# Patient Record
Sex: MALE | NOT HISPANIC OR LATINO | Employment: OTHER | URBAN - METROPOLITAN AREA
[De-identification: names, ages, dates, MRNs, and addresses within clinical notes are randomized per-mention and may not be internally consistent; named-entity substitution may affect disease eponyms.]

---

## 2017-02-01 ENCOUNTER — HOSPITAL ENCOUNTER (EMERGENCY)
Facility: MEDICAL CENTER | Age: 67
End: 2017-02-02
Attending: EMERGENCY MEDICINE
Payer: MEDICAID

## 2017-02-01 ENCOUNTER — APPOINTMENT (OUTPATIENT)
Dept: RADIOLOGY | Facility: MEDICAL CENTER | Age: 67
End: 2017-02-01
Attending: SURGERY
Payer: MEDICAID

## 2017-02-01 ENCOUNTER — APPOINTMENT (OUTPATIENT)
Dept: RADIOLOGY | Facility: MEDICAL CENTER | Age: 67
End: 2017-02-01
Attending: EMERGENCY MEDICINE
Payer: MEDICAID

## 2017-02-01 DIAGNOSIS — F10.920 ALCOHOL INTOXICATION, UNCOMPLICATED (HCC): ICD-10-CM

## 2017-02-01 LAB
ABO GROUP BLD: NORMAL
ABO GROUP BLD: NORMAL
ALBUMIN SERPL BCP-MCNC: 4.6 G/DL (ref 3.2–4.9)
ALBUMIN/GLOB SERPL: 1.3 G/DL
ALP SERPL-CCNC: 86 U/L (ref 30–99)
ALT SERPL-CCNC: 19 U/L (ref 2–50)
ANION GAP SERPL CALC-SCNC: 16 MMOL/L (ref 0–11.9)
APTT PPP: 29.5 SEC (ref 24.7–36)
AST SERPL-CCNC: 24 U/L (ref 12–45)
BILIRUB SERPL-MCNC: 0.3 MG/DL (ref 0.1–1.5)
BLD GP AB INVEST PLASRBC-IMP: NORMAL
BLD GP AB SCN SERPL QL: NORMAL
BUN SERPL-MCNC: 12 MG/DL (ref 8–22)
CALCIUM SERPL-MCNC: 9.7 MG/DL (ref 8.5–10.5)
CFT BLD TEG: 5.2 MIN (ref 5–10)
CHLORIDE SERPL-SCNC: 106 MMOL/L (ref 96–112)
CLOT ANGLE BLD TEG: 67 DEGREES (ref 53–72)
CLOT LYSIS 30M P MA LENFR BLD TEG: 0 % (ref 0–8)
CO2 SERPL-SCNC: 12 MMOL/L (ref 20–33)
CREAT SERPL-MCNC: 0.98 MG/DL (ref 0.5–1.4)
CT.EXTRINSIC BLD ROTEM: 1.7 MIN (ref 1–3)
ERYTHROCYTE [DISTWIDTH] IN BLOOD BY AUTOMATED COUNT: 45 FL (ref 35.9–50)
ETHANOL BLD-MCNC: 0.2 G/DL
GFR SERPL CREATININE-BSD FRML MDRD: >60 ML/MIN/1.73 M 2
GLOBULIN SER CALC-MCNC: 3.6 G/DL (ref 1.9–3.5)
GLUCOSE SERPL-MCNC: 112 MG/DL (ref 65–99)
HCT VFR BLD AUTO: 49.9 % (ref 42–52)
HGB BLD-MCNC: 16.8 G/DL (ref 14–18)
INR PPP: 0.89 (ref 0.87–1.13)
KELL GROUP AG RBC: NORMAL
LACTATE BLD-SCNC: 3.1 MMOL/L (ref 0.5–2)
MCF BLD TEG: 71.4 MM (ref 50–70)
MCH RBC QN AUTO: 30.7 PG (ref 27–33)
MCHC RBC AUTO-ENTMCNC: 33.7 G/DL (ref 33.7–35.3)
MCV RBC AUTO: 91.1 FL (ref 81.4–97.8)
PA AA BLD-ACNC: 85.2 %
PA ADP BLD-ACNC: 30.5 %
PLATELET # BLD AUTO: 250 K/UL (ref 164–446)
PMV BLD AUTO: 9.7 FL (ref 9–12.9)
POC BREATHALIZER: 0.11 PERCENT (ref 0–0.01)
POTASSIUM SERPL-SCNC: 4.7 MMOL/L (ref 3.6–5.5)
PROT SERPL-MCNC: 8.2 G/DL (ref 6–8.2)
PROTHROMBIN TIME: 12.3 SEC (ref 12–14.6)
RBC # BLD AUTO: 5.48 M/UL (ref 4.7–6.1)
RH BLD: NORMAL
SODIUM SERPL-SCNC: 134 MMOL/L (ref 135–145)
TEG ALGORITHM TGALG: ABNORMAL
WBC # BLD AUTO: 10 K/UL (ref 4.8–10.8)

## 2017-02-01 PROCEDURE — 85610 PROTHROMBIN TIME: CPT

## 2017-02-01 PROCEDURE — 302970 POC BREATHALIZER: Performed by: EMERGENCY MEDICINE

## 2017-02-01 PROCEDURE — 305949 HCHG RED TRAUMA ACT PRE-NOTIFY NO CC

## 2017-02-01 PROCEDURE — 99285 EMERGENCY DEPT VISIT HI MDM: CPT

## 2017-02-01 PROCEDURE — 85027 COMPLETE CBC AUTOMATED: CPT

## 2017-02-01 PROCEDURE — 72131 CT LUMBAR SPINE W/O DYE: CPT

## 2017-02-01 PROCEDURE — 700111 HCHG RX REV CODE 636 W/ 250 OVERRIDE (IP): Performed by: EMERGENCY MEDICINE

## 2017-02-01 PROCEDURE — G0390 TRAUMA RESPONS W/HOSP CRITI: HCPCS

## 2017-02-01 PROCEDURE — 85384 FIBRINOGEN ACTIVITY: CPT

## 2017-02-01 PROCEDURE — 36415 COLL VENOUS BLD VENIPUNCTURE: CPT

## 2017-02-01 PROCEDURE — 71260 CT THORAX DX C+: CPT

## 2017-02-01 PROCEDURE — 86870 RBC ANTIBODY IDENTIFICATION: CPT

## 2017-02-01 PROCEDURE — 85576 BLOOD PLATELET AGGREGATION: CPT

## 2017-02-01 PROCEDURE — 86905 BLOOD TYPING RBC ANTIGENS: CPT

## 2017-02-01 PROCEDURE — 93005 ELECTROCARDIOGRAM TRACING: CPT | Performed by: EMERGENCY MEDICINE

## 2017-02-01 PROCEDURE — 85730 THROMBOPLASTIN TIME PARTIAL: CPT

## 2017-02-01 PROCEDURE — 85347 COAGULATION TIME ACTIVATED: CPT

## 2017-02-01 PROCEDURE — 80307 DRUG TEST PRSMV CHEM ANLYZR: CPT

## 2017-02-01 PROCEDURE — 96361 HYDRATE IV INFUSION ADD-ON: CPT

## 2017-02-01 PROCEDURE — 86900 BLOOD TYPING SEROLOGIC ABO: CPT

## 2017-02-01 PROCEDURE — 71010 DX-CHEST-LIMITED (1 VIEW): CPT

## 2017-02-01 PROCEDURE — 86901 BLOOD TYPING SEROLOGIC RH(D): CPT

## 2017-02-01 PROCEDURE — 700105 HCHG RX REV CODE 258: Performed by: EMERGENCY MEDICINE

## 2017-02-01 PROCEDURE — 80053 COMPREHEN METABOLIC PANEL: CPT

## 2017-02-01 PROCEDURE — 96374 THER/PROPH/DIAG INJ IV PUSH: CPT | Mod: XU

## 2017-02-01 PROCEDURE — 70450 CT HEAD/BRAIN W/O DYE: CPT

## 2017-02-01 PROCEDURE — 86850 RBC ANTIBODY SCREEN: CPT

## 2017-02-01 PROCEDURE — 83605 ASSAY OF LACTIC ACID: CPT

## 2017-02-01 PROCEDURE — 72125 CT NECK SPINE W/O DYE: CPT

## 2017-02-01 PROCEDURE — 72128 CT CHEST SPINE W/O DYE: CPT

## 2017-02-01 RX ORDER — SODIUM CHLORIDE 9 MG/ML
2000 INJECTION, SOLUTION INTRAVENOUS ONCE
Status: COMPLETED | OUTPATIENT
Start: 2017-02-01 | End: 2017-02-02

## 2017-02-01 RX ADMIN — SODIUM CHLORIDE 2000 ML: 9 INJECTION, SOLUTION INTRAVENOUS at 22:26

## 2017-02-01 RX ADMIN — FENTANYL CITRATE 50 MCG: 50 INJECTION, SOLUTION INTRAMUSCULAR; INTRAVENOUS at 20:43

## 2017-02-01 ASSESSMENT — PAIN SCALES - GENERAL: PAINLEVEL_OUTOF10: 7

## 2017-02-01 NOTE — ED AVS SNAPSHOT
2/2/2017          Servando Durham  [address]  [place] [address]    Dear Servando:    [facility] wants to ensure your discharge home is safe and you or your loved ones have had all your questions answered regarding your care after you leave the hospital.    You may receive a telephone call within two days of your discharge.  This call is to make certain you understand your discharge instructions as well as ensure we provided you with the best care possible during your stay with us.     The call will only last approximately 3-5 minutes and will be done by a nurse.    Once again, we want to ensure your discharge home is safe and that you have a clear understanding of any next steps in your care.  If you have any questions or concerns, please do not hesitate to contact us, we are here for you.  Thank you for choosing [facility] for your healthcare needs.    Sincerely,    Tab March    [facility]

## 2017-02-01 NOTE — ED AVS SNAPSHOT
FilmMe Access Code: [email or web address]  Expires: 3/4/2017  2:04 AM    Your email address is not on file at [facility].  Email Addresses are required for you to sign up for FilmMe, please contact [phone] to verify your personal information and to provide your email address prior to attempting to register for FilmMe.    Servando Durham  [address]  [place] [address]    FilmMe  A secure, online tool to manage your health information     [facility]’s FilmMe® is a secure, online tool that connects you to your personalized health information from the privacy of your home -- day or night - making it very easy for you to manage your healthcare. Once the activation process is completed, you can even access your medical information using the FilmMe jaya, which is available for free in the Apple Jaya store or Google Play store.     To learn more about FilmMe, visit [email or web address]    There are two levels of access available (as shown below):   My Chart Features  [facility] Primary Care Doctor [facility]  Specialists [facility]  Urgent  Care Non-[facility] Primary Care Doctor   Email your healthcare team securely and privately 24/7 X X X    Manage appointments: schedule your next appointment; view details of past/upcoming appointments X      Request prescription refills. X      View recent personal medical records, including lab and immunizations X X X X   View health record, including health history, allergies, medications X X X X   Read reports about your outpatient visits, procedures, consult and ER notes X X X X   See your discharge summary, which is a recap of your hospital and/or ER visit that includes your diagnosis, lab results, and care plan X X  X     How to register for [employer]:  Once your e-mail address has been verified, follow the following steps to sign up for FilmMe.     1. Go to  [email or web address]  2. Click on the Sign Up Now box, which takes you to the New Member Sign Up page. You will need to  provide the following information:  a. Enter your Virtway Access Code exactly as it appears at the top of this page. (You will not need to use this code after you’ve completed the sign-up process. If you do not sign up before the expiration date, you must request a new code.)   b. Enter your date of birth.   c. Enter your home email address.   d. Click Submit, and follow the next screen’s instructions.  3. Create a Virtway ID. This will be your Virtway login ID and cannot be changed, so think of one that is secure and easy to remember.  4. Create a Virtway password. You can change your password at any time.  5. Enter your Password Reset Question and Answer. This can be used at a later time if you forget your password.   6. Enter your e-mail address. This allows you to receive e-mail notifications when new information is available in Virtway.  7. Click Sign Up. You can now view your health information.    For assistance activating your Virtway account, call [phone]

## 2017-02-01 NOTE — ED AVS SNAPSHOT
After Visit Summary                                                                                                                Servando Durham   MRN: [id number]    Department:  [facility], Emergency Dept   Date of Visit:  2/1/2017            [facility], Emergency Dept    [address] [address]    Phone:  [phone]      You were seen by     Jabari Valenzuela M.D.      Your Diagnosis Was     Alcohol intoxication, uncomplicated (CMS-HCC)     F10.120       These are the medications you received during your hospitalization from 02/01/2017 2025 to 02/02/2017 0204     Date/Time Order Dose Route Action    02/01/2017 2043 fentaNYL (SUBLIMAZE) injection 50 mcg Intravenous Given    02/01/2017 2226 NS infusion 2,000 mL 2,000 mL Intravenous New Bag      Follow-up Information     1. Follow up with KAY Lopez.    Specialty:  Family Medicine    Contact information    [address] [place] [address]  [phone]          2. Follow up with [facility], Emergency Dept.    Specialty:  Emergency Medicine    Why:  If symptoms worsen    Contact information    [address] [place] [id number] [phone]      Medication Information     Review all of your home medications and newly ordered medications with your primary doctor and/or pharmacist as soon as possible. Follow medication instructions as directed by your doctor and/or pharmacist.     Please keep your complete medication list with you and share with your physician. Update the information when medications are discontinued, doses are changed, or new medications (including over-the-counter products) are added; and carry medication information at all times in the event of emergency situations.               Medication List      Notice     You have not been prescribed any medications.            Procedures and tests performed during your visit     Procedure/Test Number of Times  "Performed    ABO CONFIRMATION 1    ANTIBODY IDENTIFICATION 1    APTT 1    CBC WITHOUT DIFFERENTIAL 1    COD (ADULT) 1    COMP METABOLIC PANEL 1    COMPONENT CELLULAR 1    CT-CHEST,ABDOMEN,PELVIS WITH 1    CT-CSPINE WITHOUT PLUS RECONS 1    CT-HEAD W/O 1    CT-LSPINE W/O PLUS RECONS 1    CT-TSPINE W/O PLUS RECONS 1    DIAGNOSTIC ALCOHOL 1    DX-CHEST-LIMITED (1 VIEW) 1    EKG (ER) 1    ESTIMATED GFR 1    LACTIC ACID 1    PLATELET MAPPING WITH BASIC TEG 1    POC BREATHALIZER 2    PROTHROMBIN TIME 1    RBC ANTIGEN TYPING, K1 (JUAN PABLO) 1        Discharge Instructions       Alcohol Intoxication  Alcohol intoxication occurs when the amount of alcohol that a person has consumed impairs his or her ability to mentally and physically function. Alcohol directly impairs the normal chemical activity of the brain. Drinking large amounts of alcohol can lead to changes in mental function and behavior, and it can cause many physical effects that can be harmful.   Alcohol intoxication can range in severity from mild to very severe. Various factors can affect the level of intoxication that occurs, such as the person's age, gender, weight, frequency of alcohol consumption, and the presence of other medical conditions (such as diabetes, seizures, or heart conditions). Dangerous levels of alcohol intoxication may occur when people drink large amounts of alcohol in a short period (binge drinking). Alcohol can also be especially dangerous when combined with certain prescription medicines or \"recreational\" drugs.  SIGNS AND SYMPTOMS  Some common signs and symptoms of mild alcohol intoxication include:  · Loss of coordination.  · Changes in mood and behavior.  · Impaired judgment.  · Slurred speech.  As alcohol intoxication progresses to more severe levels, other signs and symptoms will appear. These may include:  · Vomiting.  · Confusion and impaired memory.  · Slowed breathing.  · Seizures.  · Loss of consciousness.  DIAGNOSIS   Your health " care provider will take a medical history and perform a physical exam. You will be asked about the amount and type of alcohol you have consumed. Blood tests will be done to measure the concentration of alcohol in your blood. In many places, your blood alcohol level must be lower than 80 mg/dL (0.08%) to legally drive. However, many dangerous effects of alcohol can occur at much lower levels.   TREATMENT   People with alcohol intoxication often do not require treatment. Most of the effects of alcohol intoxication are temporary, and they go away as the alcohol naturally leaves the body. Your health care provider will monitor your condition until you are stable enough to go home. Fluids are sometimes given through an IV access tube to help prevent dehydration.   HOME CARE INSTRUCTIONS  · Do not drive after drinking alcohol.  · Stay hydrated. Drink enough water and fluids to keep your urine clear or pale yellow. Avoid caffeine.    · Only take over-the-counter or prescription medicines as directed by your health care provider.    SEEK MEDICAL CARE IF:   · You have persistent vomiting.    · You do not feel better after a few days.  · You have frequent alcohol intoxication. Your health care provider can help determine if you should see a substance use treatment counselor.  SEEK IMMEDIATE MEDICAL CARE IF:   · You become shaky or tremble when you try to stop drinking.    · You shake uncontrollably (seizure).    · You throw up (vomit) blood. This may be bright red or may look like black coffee grounds.    · You have blood in your stool. This may be bright red or may appear as a black, tarry, bad smelling stool.    · You become lightheaded or faint.    MAKE SURE YOU:   · Understand these instructions.  · Will watch your condition.  · Will get help right away if you are not doing well or get worse.     This information is not intended to replace advice given to you by your health care provider. Make sure you discuss any  questions you have with your health care provider.     Document Released: 09/27/2006 Document Revised: 08/20/2014 Document Reviewed: 05/23/2014  Elsevier Interactive Patient Education ©2016 [employer]            Patient Information     Patient Information    Following emergency treatment: all patient requiring follow-up care must return either to a private physician or a clinic if your condition worsens before you are able to obtain further medical attention, please return to the emergency room.     Billing Information    At [facility], we work to make the billing process streamlined for our patients.  Our Representatives are here to answer any questions you may have regarding your hospital bill.  If you have insurance coverage and have supplied your insurance information to us, we will submit a claim to your insurer on your behalf.  Should you have any questions regarding your bill, we can be reached online or by phone as follows:  Online: You are able pay your bills online or live chat with our representatives about any billing questions you may have. We are here to help Monday - Friday from 8:00am to 7:30pm and 9:00am - 12:00pm on Saturdays.  Please visit [email or web address]  for more information.   Phone:  [phone] or [phone]    Please note that your emergency physician, surgeon, pathologist, radiologist, anesthesiologist, and other specialists are not employed by [facility] and will therefore bill separately for their services.  Please contact them directly for any questions concerning their bills at the numbers below:     Emergency Physician Services:  [phone]  [facility]:  [phone]  [facility]:  [phone]  [facility]:  [phone]    1. Your final bill may vary from the amount quoted upon discharge if all procedures are not complete at that time, or if your doctor has additional procedures of which  we are not aware. You will receive an additional bill if you return to the Emergency Department at [facility] for suture removal regardless of the facility of which the sutures were placed.     2. Please arrange for settlement of this account at the emergency registration.    3. All self-pay accounts are due in full at the time of treatment.  If you are unable to meet this obligation then payment is expected within 4-5 days.     4. If you have had radiology studies (CT, X-ray, Ultrasound, MRI), you have received a preliminary result during your emergency department visit. Please contact the radiology department [phone] to receive a copy of your final result. Please discuss the Final result with your primary physician or with the follow up physician provided.     Crisis Hotline:  [place] Crisis Hotline:  0-827-EAHLUFL or 1-964.824.4199  [place] Crisis Hotline:    1-152.181.4048 or 375-578-8357         ED Discharge Follow Up Questions    1. In order to provide you with very good care, we would like to follow up with a phone call in the next few days.  May we have your permission to contact you?     YES /  NO    2. What is the best phone number to call you? (       )_____-__________    3. What is the best time to call you?      Morning  /  Afternoon  /  Evening                   Patient Signature:  ____________________________________________________________    Date:  ____________________________________________________________

## 2017-02-02 VITALS
SYSTOLIC BLOOD PRESSURE: 120 MMHG | DIASTOLIC BLOOD PRESSURE: 75 MMHG | HEART RATE: 78 BPM | BODY MASS INDEX: 48.21 KG/M2 | WEIGHT: 300 LBS | TEMPERATURE: 97.9 F | OXYGEN SATURATION: 100 % | HEIGHT: 66 IN | RESPIRATION RATE: 12 BRPM

## 2017-02-02 LAB — POC BREATHALIZER: 0.03 PERCENT (ref 0–0.01)

## 2017-02-02 PROCEDURE — 302970 POC BREATHALIZER

## 2017-02-02 NOTE — ED NOTES
Patient sleeping on gurney. Active chest rise and fall noted. No restlessness/agitation noted. Call bell remains within reach.

## 2017-02-02 NOTE — ED NOTES
Patient Patient sleeping on gurney. Active chest rise and fall noted. No restlessness/agitation noted. Call bell remains within reach.

## 2017-02-02 NOTE — ED NOTES
Patient from CT scan to [facility] via [employer]. Report received at bedside from SANTIAGO Carvalho.
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
## 2017-02-02 NOTE — ED NOTES
L chest wall central line removed. Patient tolerated well. Pressure held to site for 5 full minutes. No S/S bleeding noted.

## 2017-02-02 NOTE — ED NOTES
Patient given shirt from care closet.     Patient discharged in stable condition. Verbalized understanding of all discharge instructions. All belongings accounted for.    Patient placed in wheelchair and brought to front lobby. Assisted with calling MTM. Patient to wait in lobby until ride home arrives. OK as per FAUSTINO and MARKELL.

## 2017-02-02 NOTE — ED NOTES
Patient sleeping. Noted to be hypotensive. Discussed with ERP. IV fluids hung per orders.    Patient woken up. When fully awake BP WNL. ERP aware.
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
## 2017-02-05 LAB — EKG IMPRESSION: NORMAL

## 2017-03-19 ENCOUNTER — HOSPITAL ENCOUNTER (OUTPATIENT)
Facility: MEDICAL CENTER | Age: 67
End: 2017-03-20
Attending: EMERGENCY MEDICINE | Admitting: HOSPITALIST
Payer: MEDICAID

## 2017-03-19 ENCOUNTER — APPOINTMENT (OUTPATIENT)
Dept: RADIOLOGY | Facility: MEDICAL CENTER | Age: 67
End: 2017-03-19
Attending: EMERGENCY MEDICINE
Payer: MEDICAID

## 2017-03-19 ENCOUNTER — RESOLUTE PROFESSIONAL BILLING HOSPITAL PROF FEE (OUTPATIENT)
Dept: HOSPITALIST | Facility: MEDICAL CENTER | Age: 67
End: 2017-03-19
Payer: MEDICAID

## 2017-03-19 DIAGNOSIS — R07.9 CHEST PAIN, UNSPECIFIED TYPE: ICD-10-CM

## 2017-03-19 LAB
ALBUMIN SERPL BCP-MCNC: 4 G/DL (ref 3.2–4.9)
ALBUMIN/GLOB SERPL: 1.3 G/DL
ALP SERPL-CCNC: 85 U/L (ref 30–99)
ALT SERPL-CCNC: 18 U/L (ref 2–50)
ANION GAP SERPL CALC-SCNC: 7 MMOL/L (ref 0–11.9)
APTT PPP: 30 SEC (ref 24.7–36)
AST SERPL-CCNC: 15 U/L (ref 12–45)
BASOPHILS # BLD AUTO: 1.4 % (ref 0–1.8)
BASOPHILS # BLD: 0.11 K/UL (ref 0–0.12)
BILIRUB SERPL-MCNC: 0.4 MG/DL (ref 0.1–1.5)
BNP SERPL-MCNC: 41 PG/ML (ref 0–100)
BUN SERPL-MCNC: 11 MG/DL (ref 8–22)
CALCIUM SERPL-MCNC: 9.7 MG/DL (ref 8.5–10.5)
CHLORIDE SERPL-SCNC: 103 MMOL/L (ref 96–112)
CO2 SERPL-SCNC: 25 MMOL/L (ref 20–33)
CREAT SERPL-MCNC: 1.05 MG/DL (ref 0.5–1.4)
EKG IMPRESSION: NORMAL
EOSINOPHIL # BLD AUTO: 0.66 K/UL (ref 0–0.51)
EOSINOPHIL NFR BLD: 8.2 % (ref 0–6.9)
ERYTHROCYTE [DISTWIDTH] IN BLOOD BY AUTOMATED COUNT: 44.5 FL (ref 35.9–50)
GFR SERPL CREATININE-BSD FRML MDRD: >60 ML/MIN/1.73 M 2
GLOBULIN SER CALC-MCNC: 3.2 G/DL (ref 1.9–3.5)
GLUCOSE SERPL-MCNC: 87 MG/DL (ref 65–99)
HCT VFR BLD AUTO: 46.6 % (ref 42–52)
HGB BLD-MCNC: 15.6 G/DL (ref 14–18)
IMM GRANULOCYTES # BLD AUTO: 0.03 K/UL (ref 0–0.11)
IMM GRANULOCYTES NFR BLD AUTO: 0.4 % (ref 0–0.9)
INR PPP: 0.92 (ref 0.87–1.13)
LIPASE SERPL-CCNC: 14 U/L (ref 11–82)
LYMPHOCYTES # BLD AUTO: 2.61 K/UL (ref 1–4.8)
LYMPHOCYTES NFR BLD: 32.5 % (ref 22–41)
MCH RBC QN AUTO: 30.3 PG (ref 27–33)
MCHC RBC AUTO-ENTMCNC: 33.5 G/DL (ref 33.7–35.3)
MCV RBC AUTO: 90.5 FL (ref 81.4–97.8)
MONOCYTES # BLD AUTO: 0.78 K/UL (ref 0–0.85)
MONOCYTES NFR BLD AUTO: 9.7 % (ref 0–13.4)
NEUTROPHILS # BLD AUTO: 3.83 K/UL (ref 1.82–7.42)
NEUTROPHILS NFR BLD: 47.8 % (ref 44–72)
NRBC # BLD AUTO: 0 K/UL
NRBC BLD AUTO-RTO: 0 /100 WBC
PLATELET # BLD AUTO: 265 K/UL (ref 164–446)
PMV BLD AUTO: 9.3 FL (ref 9–12.9)
POTASSIUM SERPL-SCNC: 4.1 MMOL/L (ref 3.6–5.5)
PROT SERPL-MCNC: 7.2 G/DL (ref 6–8.2)
PROTHROMBIN TIME: 12.6 SEC (ref 12–14.6)
RBC # BLD AUTO: 5.15 M/UL (ref 4.7–6.1)
SODIUM SERPL-SCNC: 135 MMOL/L (ref 135–145)
TROPONIN I SERPL-MCNC: <0.01 NG/ML (ref 0–0.04)
WBC # BLD AUTO: 8 K/UL (ref 4.8–10.8)

## 2017-03-19 PROCEDURE — 96372 THER/PROPH/DIAG INJ SC/IM: CPT

## 2017-03-19 PROCEDURE — 85610 PROTHROMBIN TIME: CPT

## 2017-03-19 PROCEDURE — 83690 ASSAY OF LIPASE: CPT

## 2017-03-19 PROCEDURE — 93306 TTE W/DOPPLER COMPLETE: CPT

## 2017-03-19 PROCEDURE — 80053 COMPREHEN METABOLIC PANEL: CPT

## 2017-03-19 PROCEDURE — 93010 ELECTROCARDIOGRAM REPORT: CPT | Performed by: INTERNAL MEDICINE

## 2017-03-19 PROCEDURE — 83880 ASSAY OF NATRIURETIC PEPTIDE: CPT

## 2017-03-19 PROCEDURE — 85025 COMPLETE CBC W/AUTO DIFF WBC: CPT

## 2017-03-19 PROCEDURE — 84484 ASSAY OF TROPONIN QUANT: CPT

## 2017-03-19 PROCEDURE — 93005 ELECTROCARDIOGRAM TRACING: CPT | Performed by: HOSPITALIST

## 2017-03-19 PROCEDURE — 99285 EMERGENCY DEPT VISIT HI MDM: CPT

## 2017-03-19 PROCEDURE — 85730 THROMBOPLASTIN TIME PARTIAL: CPT

## 2017-03-19 PROCEDURE — G0378 HOSPITAL OBSERVATION PER HR: HCPCS

## 2017-03-19 PROCEDURE — 93005 ELECTROCARDIOGRAM TRACING: CPT | Performed by: EMERGENCY MEDICINE

## 2017-03-19 PROCEDURE — 36415 COLL VENOUS BLD VENIPUNCTURE: CPT

## 2017-03-19 PROCEDURE — 700111 HCHG RX REV CODE 636 W/ 250 OVERRIDE (IP): Performed by: HOSPITALIST

## 2017-03-19 PROCEDURE — 71010 DX-CHEST-LIMITED (1 VIEW): CPT

## 2017-03-19 PROCEDURE — A9270 NON-COVERED ITEM OR SERVICE: HCPCS | Performed by: HOSPITALIST

## 2017-03-19 PROCEDURE — 700102 HCHG RX REV CODE 250 W/ 637 OVERRIDE(OP): Performed by: HOSPITALIST

## 2017-03-19 PROCEDURE — 93306 TTE W/DOPPLER COMPLETE: CPT | Mod: 26 | Performed by: INTERNAL MEDICINE

## 2017-03-19 PROCEDURE — 99220 PR INITIAL OBSERVATION CARE,LEVL III: CPT | Performed by: HOSPITALIST

## 2017-03-19 RX ORDER — ONDANSETRON 2 MG/ML
4 INJECTION INTRAMUSCULAR; INTRAVENOUS EVERY 4 HOURS PRN
Status: DISCONTINUED | OUTPATIENT
Start: 2017-03-19 | End: 2017-03-20 | Stop reason: HOSPADM

## 2017-03-19 RX ORDER — PROMETHAZINE HYDROCHLORIDE 25 MG/1
12.5-25 TABLET ORAL EVERY 4 HOURS PRN
Status: DISCONTINUED | OUTPATIENT
Start: 2017-03-19 | End: 2017-03-20 | Stop reason: HOSPADM

## 2017-03-19 RX ORDER — OXYCODONE HYDROCHLORIDE 5 MG/1
5 TABLET ORAL
Status: DISCONTINUED | OUTPATIENT
Start: 2017-03-19 | End: 2017-03-20 | Stop reason: HOSPADM

## 2017-03-19 RX ORDER — OMEPRAZOLE 20 MG/1
20 CAPSULE, DELAYED RELEASE ORAL DAILY
Status: DISCONTINUED | OUTPATIENT
Start: 2017-03-19 | End: 2017-03-20 | Stop reason: HOSPADM

## 2017-03-19 RX ORDER — CALCIUM CARBONATE 500 MG/1
1000 TABLET, CHEWABLE ORAL 3 TIMES DAILY PRN
COMMUNITY

## 2017-03-19 RX ORDER — BISACODYL 10 MG
10 SUPPOSITORY, RECTAL RECTAL
Status: DISCONTINUED | OUTPATIENT
Start: 2017-03-19 | End: 2017-03-20 | Stop reason: HOSPADM

## 2017-03-19 RX ORDER — IBUPROFEN 200 MG
200 TABLET ORAL EVERY 6 HOURS PRN
COMMUNITY

## 2017-03-19 RX ORDER — AMOXICILLIN 250 MG
2 CAPSULE ORAL 2 TIMES DAILY
Status: DISCONTINUED | OUTPATIENT
Start: 2017-03-19 | End: 2017-03-20 | Stop reason: HOSPADM

## 2017-03-19 RX ORDER — MORPHINE SULFATE 4 MG/ML
1-4 INJECTION, SOLUTION INTRAMUSCULAR; INTRAVENOUS
Status: DISCONTINUED | OUTPATIENT
Start: 2017-03-19 | End: 2017-03-19

## 2017-03-19 RX ORDER — LANOLIN ALCOHOL/MO/W.PET/CERES
CREAM (GRAM) TOPICAL PRN
Status: DISCONTINUED | OUTPATIENT
Start: 2017-03-19 | End: 2017-03-20 | Stop reason: HOSPADM

## 2017-03-19 RX ORDER — DIPHENHYDRAMINE HCL 25 MG
25 TABLET ORAL EVERY 6 HOURS PRN
Status: DISCONTINUED | OUTPATIENT
Start: 2017-03-19 | End: 2017-03-20 | Stop reason: HOSPADM

## 2017-03-19 RX ORDER — ONDANSETRON 4 MG/1
4 TABLET, ORALLY DISINTEGRATING ORAL EVERY 4 HOURS PRN
Status: DISCONTINUED | OUTPATIENT
Start: 2017-03-19 | End: 2017-03-20 | Stop reason: HOSPADM

## 2017-03-19 RX ORDER — LISINOPRIL 20 MG/1
40 TABLET ORAL DAILY
Status: DISCONTINUED | OUTPATIENT
Start: 2017-03-20 | End: 2017-03-20 | Stop reason: HOSPADM

## 2017-03-19 RX ORDER — LOVASTATIN 20 MG/1
20 TABLET ORAL
Status: DISCONTINUED | OUTPATIENT
Start: 2017-03-19 | End: 2017-03-20 | Stop reason: HOSPADM

## 2017-03-19 RX ORDER — POLYETHYLENE GLYCOL 3350 17 G/17G
1 POWDER, FOR SOLUTION ORAL DAILY
Status: DISCONTINUED | OUTPATIENT
Start: 2017-03-19 | End: 2017-03-20 | Stop reason: HOSPADM

## 2017-03-19 RX ORDER — HEPARIN SODIUM 5000 [USP'U]/ML
5000 INJECTION, SOLUTION INTRAVENOUS; SUBCUTANEOUS EVERY 8 HOURS
Status: DISCONTINUED | OUTPATIENT
Start: 2017-03-19 | End: 2017-03-20 | Stop reason: HOSPADM

## 2017-03-19 RX ORDER — BUDESONIDE AND FORMOTEROL FUMARATE DIHYDRATE 160; 4.5 UG/1; UG/1
2 AEROSOL RESPIRATORY (INHALATION) 2 TIMES DAILY
Status: DISCONTINUED | OUTPATIENT
Start: 2017-03-19 | End: 2017-03-20 | Stop reason: HOSPADM

## 2017-03-19 RX ORDER — PROMETHAZINE HYDROCHLORIDE 12.5 MG/1
12.5-25 SUPPOSITORY RECTAL EVERY 4 HOURS PRN
Status: DISCONTINUED | OUTPATIENT
Start: 2017-03-19 | End: 2017-03-20 | Stop reason: HOSPADM

## 2017-03-19 RX ORDER — POLYETHYLENE GLYCOL 3350 17 G/17G
1 POWDER, FOR SOLUTION ORAL
Status: DISCONTINUED | OUTPATIENT
Start: 2017-03-19 | End: 2017-03-20 | Stop reason: HOSPADM

## 2017-03-19 RX ORDER — ALBUTEROL SULFATE 90 UG/1
2 AEROSOL, METERED RESPIRATORY (INHALATION) EVERY 6 HOURS PRN
Status: DISCONTINUED | OUTPATIENT
Start: 2017-03-19 | End: 2017-03-20 | Stop reason: HOSPADM

## 2017-03-19 RX ADMIN — METOPROLOL TARTRATE 25 MG: 25 TABLET, FILM COATED ORAL at 20:47

## 2017-03-19 RX ADMIN — LOVASTATIN 20 MG: 20 TABLET ORAL at 20:48

## 2017-03-19 RX ADMIN — HEPARIN SODIUM 5000 UNITS: 5000 INJECTION, SOLUTION INTRAVENOUS; SUBCUTANEOUS at 20:47

## 2017-03-19 RX ADMIN — OXYCODONE HYDROCHLORIDE 5 MG: 5 TABLET ORAL at 18:41

## 2017-03-19 RX ADMIN — POLYETHYLENE GLYCOL 3350 1 PACKET: 17 POWDER, FOR SOLUTION ORAL at 17:20

## 2017-03-19 RX ADMIN — Medication: at 20:47

## 2017-03-19 RX ADMIN — OMEPRAZOLE 20 MG: 20 CAPSULE, DELAYED RELEASE ORAL at 17:20

## 2017-03-19 RX ADMIN — STANDARDIZED SENNA CONCENTRATE AND DOCUSATE SODIUM 2 TABLET: 8.6; 5 TABLET, FILM COATED ORAL at 20:47

## 2017-03-19 RX ADMIN — BUDESONIDE AND FORMOTEROL FUMARATE DIHYDRATE 2 PUFF: 160; 4.5 AEROSOL RESPIRATORY (INHALATION) at 20:46

## 2017-03-19 ASSESSMENT — PAIN SCALES - GENERAL
PAINLEVEL_OUTOF10: 6
PAINLEVEL_OUTOF10: 7
PAINLEVEL_OUTOF10: 5

## 2017-03-19 ASSESSMENT — COPD QUESTIONNAIRES
HAVE YOU SMOKED AT LEAST 100 CIGARETTES IN YOUR ENTIRE LIFE: YES
DURING THE PAST 4 WEEKS HOW MUCH DID YOU FEEL SHORT OF BREATH: SOME OF THE TIME
DO YOU EVER COUGH UP ANY MUCUS OR PHLEGM?: YES, A FEW DAYS A WEEK OR MONTH
COPD SCREENING SCORE: 7

## 2017-03-19 ASSESSMENT — LIFESTYLE VARIABLES
ALCOHOL_USE: NO
EVER_SMOKED: YES
EVER_SMOKED: YES
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
## 2017-03-19 NOTE — ED NOTES
Pt bib ems from home. Pt c/o chest pain starting yesterday. Pt took 324mg ASA and nitro x1 per report. Pt with cardiac hx.

## 2017-03-19 NOTE — DISCHARGE PLANNING
Care Transition Team Assessment    Information Source  Orientation : Oriented x 4  Information Given By: Patient  Informant's Name:  (Servando)  Who is responsible for making decisions for patient? : Patient    Readmission Evaluation  Is this a readmission?: No    Elopement Risk  Legal Hold: No    Interdisciplinary Discharge Planning  Does Admitting Nurse Feel This Could be a Complex Discharge?: No  Primary Care Physician:  (Andrew GARCIA)  Lives with - Patient's Self Care Capacity: Other (Comments)  Support Systems: Family Member(s)  Housing / Facility: [address]  Do You Take your Prescribed Medications Regularly: Yes  Able to Return to Previous ADL's: Yes  Mobility Issues: Yes  Patient Expects to be Discharged to::  (home)  Assistance Needed: Unknown at this Time  Durable Medical Equipment:  (owns his w/c, LLE amputee)    Discharge Preparedness  What is your plan after discharge?:  (home, lives with sister, has family and son in [place].)  Difficulity with ADLs: Walking  Difficulty with ADLs Comment:  (w/c)  Difficulity with IADLs: Driving         Finances  Financial Barriers to Discharge: No    Vision / Hearing Impairment  Vision Impairment : No  Hearing Impairment : No         Advance Directive  Advance Directive?: None  Advance Directive offered?:  (declines)
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
## 2017-03-19 NOTE — ED PROVIDER NOTES
ED Provider Note    CHIEF COMPLAINT  Chief Complaint   Patient presents with   • Chest Pain       HPI  Servando Durham is a 66 y.o. male with a history of CADz, hypertension, CHF, and diabetes who presents complaining of chest pain.    Began at rest on couch yesterday afternoon  CP substernal, sharp, pinching pain associated with SOB and nausea  Increased with ambulation, DI and movement  Pain intermittent since yesterday      Denies vomiting, diaphoresis, cough, fever, chills, abdominal pain, syncope.    Took  this am  Nitro given, no relief    ALLERGIES  Allergies   Allergen Reactions   • Morphine Hives   • Banana Swelling     Pt reports when bananas are consumed pt develops an itchy and swollen mouth and eyes water.     • Morphine Itching       CURRENT MEDICATIONS  Home Medications     Reviewed by Gail Davis, Pharmacy Int (Pharmacy Intern) on 03/19/17 at 1620  Med List Status: Complete    Medication Last Dose Status    albuterol inhaler 2 Puff  Active    aspirin EC (ECOTRIN) 81 MG TBEC 3/19/2017 Active    budesonide-formoterol (SYMBICORT) 160-4.5 MCG/ACT inhaler 2 Puff  Active    calcium carbonate (TUMS) 500 MG Chew Tab few days ago Active    ibuprofen (MOTRIN) 800 MG Tab 3/19/2017 Active    lisinopril (PRINIVIL, ZESTRIL) 40 MG tablet 3/19/2017 Active    lovastatin (MEVACOR) 20 MG Tab 3/19/2017 Active    metoprolol (LOPRESSOR) 25 MG TABS 3/19/2017 Active    omeprazole (PRILOSEC) capsule 20 mg  Active                PAST MEDICAL HISTORY   has a past medical history of Glaucoma; Arthritis; ETOH abuse; Glaucoma; Congestive heart failure (CMS-HCC); Indigestion; Arthritis; Backpain; Aspiration pneumonia (CMS-HCC) (12/27/2011); Fall; Diabetes (CMS-HCC); and Hypertension.    SURGICAL HISTORY   has past surgical history that includes other; other orthopedic surgery; other (1970); and amputation low leg thru tib/fib.    SOCIAL HISTORY  Social History     Social History Main Topics   • Smoking status: Current Some  "Day Smoker -- 0.10 packs/day   • Smokeless tobacco: Never Used   • Alcohol Use: Yes      Comment: occassional   • Drug Use: No   • Sexual Activity: Not on file       REVIEW OF SYSTEMS  See HPI for further details.  All other systems are negative except as above in HPI.    PHYSICAL EXAM  VITAL SIGNS: Pulse 58  Temp(Src) 36.6 °C (97.9 °F)  Resp 16  Ht 1.676 m (5' 5.98\")  Wt 104.327 kg (230 lb)  BMI 37.14 kg/m2  SpO2 99%    General:  WDobese, nontoxic appearing in NAD; A+Ox3; V/S as above   Skin: warm and dry; good color; no rash  HEENT: NCAT; EOMs intact; no scleral icterus   Neck: FROM; no meningismus, no JVD  Cardiovascular: Regular heart rate and rhythm.  No murmurs, rubs, or gallops; pulses 2+ bilaterally radially; no chest wall tenderness or crepitus  Lungs: Clear to auscultation with good air movement bilaterally.  No wheezes, rhonchi, or rales.   Abdomen: BS present; soft; NTND; no rebound, guarding, or rigidity.  No organomegaly or pulsatile mass  Extremities: LBUE x 4; 1+ right pedal edema; hyperpigmentation of the right lower extremity noted but unable to fully examine the leg as patient is clothed; left leg with AKA  Neurologic: CNs III-XII grossly intact; speech clear; distal sensation intact; strength 5/5 UE/LEs  Psychiatric: Appropriate affect, normal mood    LABS  Results for orders placed or performed during the hospital encounter of 03/19/17   TROPONIN   Result Value Ref Range    Troponin I <0.01 0.00 - 0.04 ng/mL   BTYPE NATRIURETIC PEPTIDE   Result Value Ref Range    B Natriuretic Peptide 41 0 - 100 pg/mL   CBC WITH DIFFERENTIAL   Result Value Ref Range    WBC 8.0 4.8 - 10.8 K/uL    RBC 5.15 4.70 - 6.10 M/uL    Hemoglobin 15.6 14.0 - 18.0 g/dL    Hematocrit 46.6 42.0 - 52.0 %    MCV 90.5 81.4 - 97.8 fL    MCH 30.3 27.0 - 33.0 pg    MCHC 33.5 (L) 33.7 - 35.3 g/dL    RDW 44.5 35.9 - 50.0 fL    Platelet Count 265 164 - 446 K/uL    MPV 9.3 9.0 - 12.9 fL    Neutrophils-Polys 47.80 44.00 - 72.00 %    " Lymphocytes 32.50 22.00 - 41.00 %    Monocytes 9.70 0.00 - 13.40 %    Eosinophils 8.20 (H) 0.00 - 6.90 %    Basophils 1.40 0.00 - 1.80 %    Immature Granulocytes 0.40 0.00 - 0.90 %    Nucleated RBC 0.00 /100 WBC    Neutrophils (Absolute) 3.83 1.82 - 7.42 K/uL    Lymphs (Absolute) 2.61 1.00 - 4.80 K/uL    Monos (Absolute) 0.78 0.00 - 0.85 K/uL    Eos (Absolute) 0.66 (H) 0.00 - 0.51 K/uL    Baso (Absolute) 0.11 0.00 - 0.12 K/uL    Immature Granulocytes (abs) 0.03 0.00 - 0.11 K/uL    NRBC (Absolute) 0.00 K/uL   COMP METABOLIC PANEL   Result Value Ref Range    Sodium 135 135 - 145 mmol/L    Potassium 4.1 3.6 - 5.5 mmol/L    Chloride 103 96 - 112 mmol/L    Co2 25 20 - 33 mmol/L    Anion Gap 7.0 0.0 - 11.9    Glucose 87 65 - 99 mg/dL    Bun 11 8 - 22 mg/dL    Creatinine 1.05 0.50 - 1.40 mg/dL    Calcium 9.7 8.5 - 10.5 mg/dL    AST(SGOT) 15 12 - 45 U/L    ALT(SGPT) 18 2 - 50 U/L    Alkaline Phosphatase 85 30 - 99 U/L    Total Bilirubin 0.4 0.1 - 1.5 mg/dL    Albumin 4.0 3.2 - 4.9 g/dL    Total Protein 7.2 6.0 - 8.2 g/dL    Globulin 3.2 1.9 - 3.5 g/dL    A-G Ratio 1.3 g/dL   PROTHROMBIN TIME   Result Value Ref Range    PT 12.6 12.0 - 14.6 sec    INR 0.92 0.87 - 1.13   APTT   Result Value Ref Range    APTT 30.0 24.7 - 36.0 sec   LIPASE   Result Value Ref Range    Lipase 14 11 - 82 U/L   ESTIMATED GFR   Result Value Ref Range    GFR If African American >60 >60 mL/min/1.73 m 2    GFR If Non African American >60 >60 mL/min/1.73 m 2   EKG (NOW)   Result Value Ref Range    Report       [facility] Emergency Dept.    Test Date:  2017  Pt Name:    DANIEL MAGANA                 Department: ER  MRN:        [id number]                      Room:       RD 10  Gender:     M                            Technician: [id number]  :        1950                   Requested By:ER TRIAGE PROTOCOL  Order #:    [id number]                    Reading MD:    Measurements  Intervals                                Axis  Rate:        60                           P:          52  MN:         184                          QRS:        -46  QRSD:       82                           T:          27  QT:         416  QTc:        416    Interpretive Statements  SINUS RHYTHM  LEFT ANTERIOR FASCICULAR BLOCK  EARLY PRECORDIAL R/S TRANSITION  Compared to ECG 2017 20:38:41  First degree AV block no longer present     EKG (ER)   Result Value Ref Range    Report       [facility] Emergency Dept.    Test Date:  2017  Pt Name:    SERVANDO MAGANA                 Department: ER  MRN:        [id number]                      Room:       RD 10  Gender:     M                            Technician: SHADY  :        1950                   Requested By:GABBY BISHOP  Order #:    [id number]                    Reading MD:    Measurements  Intervals                                Axis  Rate:       54                           P:          26  MN:         196                          QRS:        -50  QRSD:       82                           T:          35  QT:         436  QTc:        414    Interpretive Statements  SINUS BRADYCARDIA  LEFT ANTERIOR FASCICULAR BLOCK  LATE PRECORDIAL R/S TRANSITION  Compared to ECG 2017 12:16:43  Sinus rhythm no longer present       IMAGING  DX-CHEST-LIMITED (1 VIEW)   Final Result      No acute cardiopulmonary abnormality identified.          MEDICAL RECORD  I have reviewed patient's medical record and pertinent results are listed below.    Patient had a normal nuclear med echo/stress in 2014    COURSE & MEDICAL DECISION MAKING  I have reviewed any medical record information, laboratory studies and radiographic results as noted.    Servando Magana is a 66 y.o. Male with h/o CADz, diabetes, and hypertension who presents complaining of chest pain associated with nausea and shortness of breath.    Patient was given aspirin and nitroglycerin prior to arrival.    EKG demonstrates no acute ST changes. Repeat EKG  for continued chest pain demonstrates sinus bradycardia with no change.    Chest x-ray demonstrates no acute process.    Troponin is negative along with the remainder of the patient's blood work except for an eosinophilia. If the patient has GERD, this could indicate an eosinophilic esophagitis.      15:20  I paged [facility] for admission but they're capped. Dr. Chapa from the hospitalist service agrees to admit the patient.      FINAL IMPRESSION  1. Chest pain, unspecified type        Electronically signed by: Sena Valente, 3/19/2017 12:24 PM

## 2017-03-20 ENCOUNTER — APPOINTMENT (OUTPATIENT)
Dept: RADIOLOGY | Facility: MEDICAL CENTER | Age: 67
End: 2017-03-20
Attending: HOSPITALIST
Payer: MEDICAID

## 2017-03-20 VITALS
OXYGEN SATURATION: 98 % | DIASTOLIC BLOOD PRESSURE: 63 MMHG | HEIGHT: 66 IN | HEART RATE: 61 BPM | BODY MASS INDEX: 38.02 KG/M2 | SYSTOLIC BLOOD PRESSURE: 117 MMHG | TEMPERATURE: 97.5 F | RESPIRATION RATE: 14 BRPM | WEIGHT: 236.55 LBS

## 2017-03-20 LAB
ALBUMIN SERPL BCP-MCNC: 3.7 G/DL (ref 3.2–4.9)
BASOPHILS # BLD AUTO: 1.5 % (ref 0–1.8)
BASOPHILS # BLD: 0.11 K/UL (ref 0–0.12)
BUN SERPL-MCNC: 13 MG/DL (ref 8–22)
CALCIUM SERPL-MCNC: 9.6 MG/DL (ref 8.5–10.5)
CHLORIDE SERPL-SCNC: 103 MMOL/L (ref 96–112)
CHOLEST SERPL-MCNC: 152 MG/DL (ref 100–199)
CO2 SERPL-SCNC: 27 MMOL/L (ref 20–33)
CREAT SERPL-MCNC: 1 MG/DL (ref 0.5–1.4)
EKG IMPRESSION: NORMAL
EOSINOPHIL # BLD AUTO: 0.67 K/UL (ref 0–0.51)
EOSINOPHIL NFR BLD: 9 % (ref 0–6.9)
ERYTHROCYTE [DISTWIDTH] IN BLOOD BY AUTOMATED COUNT: 44.9 FL (ref 35.9–50)
GFR SERPL CREATININE-BSD FRML MDRD: >60 ML/MIN/1.73 M 2
GLUCOSE SERPL-MCNC: 93 MG/DL (ref 65–99)
HCT VFR BLD AUTO: 44.9 % (ref 42–52)
HDLC SERPL-MCNC: 46 MG/DL
HGB BLD-MCNC: 15.1 G/DL (ref 14–18)
IMM GRANULOCYTES # BLD AUTO: 0.03 K/UL (ref 0–0.11)
IMM GRANULOCYTES NFR BLD AUTO: 0.4 % (ref 0–0.9)
LDLC SERPL CALC-MCNC: 86 MG/DL
LV EJECT FRACT  99904: 55
LV EJECT FRACT MOD 2C 99903: 36.07
LV EJECT FRACT MOD 4C 99902: 55
LV EJECT FRACT MOD BP 99901: 55
LYMPHOCYTES # BLD AUTO: 2.57 K/UL (ref 1–4.8)
LYMPHOCYTES NFR BLD: 34.7 % (ref 22–41)
MCH RBC QN AUTO: 30.4 PG (ref 27–33)
MCHC RBC AUTO-ENTMCNC: 33.6 G/DL (ref 33.7–35.3)
MCV RBC AUTO: 90.5 FL (ref 81.4–97.8)
MONOCYTES # BLD AUTO: 0.71 K/UL (ref 0–0.85)
MONOCYTES NFR BLD AUTO: 9.6 % (ref 0–13.4)
NEUTROPHILS # BLD AUTO: 3.32 K/UL (ref 1.82–7.42)
NEUTROPHILS NFR BLD: 44.8 % (ref 44–72)
NRBC # BLD AUTO: 0 K/UL
NRBC BLD AUTO-RTO: 0 /100 WBC
PHOSPHATE SERPL-MCNC: 4.7 MG/DL (ref 2.5–4.5)
PLATELET # BLD AUTO: 240 K/UL (ref 164–446)
PMV BLD AUTO: 9.3 FL (ref 9–12.9)
POTASSIUM SERPL-SCNC: 4.2 MMOL/L (ref 3.6–5.5)
RBC # BLD AUTO: 4.96 M/UL (ref 4.7–6.1)
SODIUM SERPL-SCNC: 135 MMOL/L (ref 135–145)
TRIGL SERPL-MCNC: 99 MG/DL (ref 0–149)
WBC # BLD AUTO: 7.4 K/UL (ref 4.8–10.8)

## 2017-03-20 PROCEDURE — A9502 TC99M TETROFOSMIN: HCPCS

## 2017-03-20 PROCEDURE — 700111 HCHG RX REV CODE 636 W/ 250 OVERRIDE (IP)

## 2017-03-20 PROCEDURE — A9270 NON-COVERED ITEM OR SERVICE: HCPCS | Performed by: HOSPITALIST

## 2017-03-20 PROCEDURE — G0378 HOSPITAL OBSERVATION PER HR: HCPCS

## 2017-03-20 PROCEDURE — 96372 THER/PROPH/DIAG INJ SC/IM: CPT | Mod: XU

## 2017-03-20 PROCEDURE — 80069 RENAL FUNCTION PANEL: CPT

## 2017-03-20 PROCEDURE — 700102 HCHG RX REV CODE 250 W/ 637 OVERRIDE(OP): Performed by: HOSPITALIST

## 2017-03-20 PROCEDURE — 80061 LIPID PANEL: CPT

## 2017-03-20 PROCEDURE — 85025 COMPLETE CBC W/AUTO DIFF WBC: CPT

## 2017-03-20 PROCEDURE — 700111 HCHG RX REV CODE 636 W/ 250 OVERRIDE (IP): Performed by: HOSPITALIST

## 2017-03-20 PROCEDURE — 99217 PR OBSERVATION CARE DISCHARGE: CPT | Performed by: INTERNAL MEDICINE

## 2017-03-20 PROCEDURE — 36415 COLL VENOUS BLD VENIPUNCTURE: CPT

## 2017-03-20 RX ORDER — REGADENOSON 0.08 MG/ML
INJECTION, SOLUTION INTRAVENOUS
Status: COMPLETED
Start: 2017-03-20 | End: 2017-03-20

## 2017-03-20 RX ADMIN — HEPARIN SODIUM 5000 UNITS: 5000 INJECTION, SOLUTION INTRAVENOUS; SUBCUTANEOUS at 05:45

## 2017-03-20 RX ADMIN — LISINOPRIL 40 MG: 20 TABLET ORAL at 10:00

## 2017-03-20 RX ADMIN — OMEPRAZOLE 20 MG: 20 CAPSULE, DELAYED RELEASE ORAL at 10:00

## 2017-03-20 RX ADMIN — ASPIRIN 81 MG: 81 TABLET ORAL at 10:00

## 2017-03-20 RX ADMIN — BUDESONIDE AND FORMOTEROL FUMARATE DIHYDRATE 2 PUFF: 160; 4.5 AEROSOL RESPIRATORY (INHALATION) at 10:00

## 2017-03-20 RX ADMIN — METOPROLOL TARTRATE 25 MG: 25 TABLET, FILM COATED ORAL at 10:00

## 2017-03-20 RX ADMIN — REGADENOSON 0.4 MG: 0.08 INJECTION, SOLUTION INTRAVENOUS at 08:12

## 2017-03-20 ASSESSMENT — PAIN SCALES - GENERAL
PAINLEVEL_OUTOF10: 0
PAINLEVEL_OUTOF10: 0

## 2017-03-20 NOTE — DISCHARGE SUMMARY
Hospital Medicine Discharge Note     Admit Date:  3/19/2017         Discharge Date:   3/20/2017    Primary Care Provider:    KAY Lopez    Attending Physician:  Celeste Jacobs M.D.    Discharge Diagnoses:   Active Problems:    Chest pain, rule out acute myocardial infarction    Chronic Medical Problems:  •  Glaucoma          left eye    •  Arthritis          right leg    •  ETOH abuse      •  Glaucoma      •  Congestive heart failure (CMS-HCC)      •  Indigestion      •  Arthritis      •  Backpain      •  Aspiration pneumonia (CMS-[facility])  12/27/2011    •  Fall      •  Diabetes (CMS-[facility])      •  Hypertension                   Consultants:      Cardiology     Studies:  Cbc negative. Chem panel unremarkable. Troponin 0.01  Imaging/ Testing:      NM-CARDIAC STRESS TEST   Final Result    Myocardial Perfusion   Report   NUCLEAR IMAGING INTERPRETATION   Small distal inferior myocardial infarct.   No reversible ischemia.     ECHOCARDIOGRAM COMP W/O CONT   Preliminary Result   CONCLUSIONS  The left ventricle was normal in size and function. Normal left   ventricular wall thickness.   Left ventricular ejection fraction is   visually estimated to be 55%. A reliable estimation of diastolic   function cannot be made due to conflicting data.   Compared to the report of the study done 2/2014- the LVEF appears   normal on the current study.      DX-CHEST-LIMITED (1 VIEW)   Final Result   No acute cardiopulmonary abnormality identified.        Procedures:        None     Hospital Summary (Brief Narrative):       For H and P on admission, please refer to full H and P dictated by Dr. Chapa on 3/19/17. In brief this is a 66 year old male with left eye glaucoma, alcohol use, diabetes, hypertension, CHF, prior stenting who presents with chest pain.  His stress test did show a small distal inferior myocardial infarct with no reversible ischemia. Echocardiogram was normal per read. Cardiology consulted given abnormal stress finding.  Likely a false positive MPI. No chest pain currently. Medically manage and have him follow up with PCP and Cards outpatient     Disposition:   Discharge home    Condition:  Stable    Activity:   As tolerated    Diet:   As tolerated    Discharge Medications:         Servando Durham   Home Medication Instructions IGLESIA:[id number]    Printed on:03/20/17 8364   Medication Information                      aspirin EC (ECOTRIN) 81 MG TBEC  Take 81 mg by mouth every day.             calcium carbonate (TUMS) 500 MG Chew Tab  Take 500 mg by mouth 1 time daily as needed.             ibuprofen (MOTRIN) 800 MG Tab  Take 800 mg by mouth every 8 hours as needed (Leg pain).             lisinopril (PRINIVIL, ZESTRIL) 40 MG tablet  Take 40 mg by mouth every day.             lovastatin (MEVACOR) 20 MG Tab  Take 20 mg by mouth every day.             metoprolol (LOPRESSOR) 25 MG TABS  Take 25 mg by mouth 2 times a day.                   Follow up appointment details :      PCP in 2 weeks    Instructions:      Return to ER if new or worsening symptoms develop

## 2017-03-20 NOTE — CONSULTS
Pt seen and examined  Noted dictated  CP with prob MI on MPI but no ischemia  EKG and ECHO did not show prior MI  Likely false positive MPI  CP atypical  Med Rx for now

## 2017-03-20 NOTE — DISCHARGE PLANNING
Care Transition Team Assessment    Information Source  Orientation : Oriented x 4  Information Given By: Patient  Informant's Name:  (Servando)  Who is responsible for making decisions for patient? : Patient    Readmission Evaluation  Is this a readmission?: No    Elopement Risk  Legal Hold: No  Ambulatory or Self [place] in Wheelchair: Yes  Disoriented: No  Psychiatric Symptoms: None  History of Wandering: No  Elopement this Admit: No  Vocalizing Wanting to Leave: No  Displays Behaviors, Body Language Wanting to Leave: No-Not at Risk for Elopement  Elopement Risk: Not at Risk for Elopement    Interdisciplinary Discharge Planning  Does Admitting Nurse Feel This Could be a Complex Discharge?: No  Primary Care Physician:  (Andrew GARCIA)  Lives with - Patient's Self Care Capacity: Other (Comments)  Support Systems: Family Member(s)  Housing / Facility: [address]  Do You Take your Prescribed Medications Regularly: Yes  Able to Return to Previous ADL's: Yes  Mobility Issues: Yes  Patient Expects to be Discharged to:: home  Assistance Needed: Unknown at this Time  Durable Medical Equipment:  (owns his w/c, LLE amputee)    Discharge Preparedness  What is your plan after discharge?:  (home, lives with sister, has family and son in [place].)  Difficulity with ADLs: Walking  Difficulty with ADLs Comment:  (w/c)  Difficulity with IADLs: Driving         Finances  Financial Barriers to Discharge: No    Vision / Hearing Impairment  Vision Impairment : Yes  Right Eye Vision: Impaired, Wears Glasses  Left Eye Vision: Impaired, Wears Glasses  Hearing Impairment : No    Values / Beliefs / Concerns  Values / Beliefs Concerns : No    Advance Directive  Advance Directive?: None  Advance Directive offered?:  (declines)    Domestic Abuse  Have you ever been the victim of abuse or violence?: No  Physical Abuse or Sexual Abuse: No  Verbal Abuse or Emotional Abuse: No  Possible Abuse Reported to:: Not Applicable

## 2017-03-20 NOTE — RESPIRATORY CARE
COPD EDUCATION by COPD CLINICAL EDUCATOR  3/20/2017 at 6:06 AM by Gloria Broderick     Patient reviewed by COPD education team. Patient does not qualify for COPD program.

## 2017-03-20 NOTE — PROGRESS NOTES
Assessment done, Pt educated on plan of care.to have stress test this am  Patient resting in bed, no signs of distress,  no complains of pain at this time. Call light within reach,  side rails up, will monitor. Condition stable

## 2017-03-20 NOTE — PROGRESS NOTES
Report received, pt care assumed, tele box on. Pt assessment complete. Pt aaox4, no signs of distress noted at this time. POC discussed with pt and verbalizes no questions. Pt denies any additional needs at this time. Bed in lowest position and locked. Pt educated on fall risk and verbalized understanding, call light and personal belongings within reach, will continue to monitor.

## 2017-03-20 NOTE — PROGRESS NOTES
Assumed care of pt, pt A&OX4, c/o pain on R leg and intermittent cp. Pain meds working for pt, no n/t, present L AKA, no open wounds but flaky, purplish present on R leg which is chronic from previous cellulitis per pt, CMS intact, no SOB, lung clear on all lobes, BS+, LBM 3/18, using urinal good, safety measures in placed. 2x rails up, discussed POC, all needs are met, hourly rounding in use.
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
## 2017-03-21 NOTE — DISCHARGE INSTRUCTIONS
Discharge Instructions    Discharged to home by car with relative. Discharged via wheelchair, hospital escort: Yes.  Special equipment needed: Not Applicable    Be sure to schedule a follow-up appointment with your primary care doctor or any specialists as instructed.     Discharge Plan:   Diet Plan: Discussed  Activity Level: Discussed  Confirmed Follow up Appointment: Patient to Call and Schedule Appointment  Confirmed Symptoms Management: Discussed  Medication Reconciliation Updated: Yes  Influenza Vaccine Indication: Not indicated: Previously immunized this influenza season and > 8 years of age    I understand that a diet low in cholesterol, fat, and sodium is recommended for good health. Unless I have been given specific instructions below for another diet, I accept this instruction as my diet prescription.   Other diet: LOW FAT    Special Instructions: None    · Is patient discharged on Warfarin / Coumadin?   No     · Is patient Post Blood Transfusion?  No    Depression / Suicide Risk    As you are discharged from this [facility] facility, it is important to learn how to keep safe from harming yourself.    Recognize the warning signs:  · Abrupt changes in personality, positive or negative- including increase in energy   · Giving away possessions  · Change in eating patterns- significant weight changes-  positive or negative  · Change in sleeping patterns- unable to sleep or sleeping all the time   · Unwillingness or inability to communicate  · Depression  · Unusual sadness, discouragement and loneliness  · Talk of wanting to die  · Neglect of personal appearance   · Rebelliousness- reckless behavior  · Withdrawal from people/activities they love  · Confusion- inability to concentrate     If you or a loved one observes any of these behaviors or has concerns about self-harm, here's what you can do:  · Talk about it- your feelings and reasons for harming yourself  · Remove any means that you might use to hurt  yourself (examples: pills, rope, extension cords, firearm)  · Get professional help from the community (Mental Health, Substance Abuse, psychological counseling)  · Do not be alone:Call your Safe Contact- someone whom you trust who will be there for you.  · Call your local CRISIS HOTLINE 228-6144 or 733-859-4831  · Call your local Children's Mobile Crisis Response Team [place] (444) 993-3274 or www.[employer]  · Call the toll free National Suicide Prevention Hotlines   · National Suicide Prevention Lifeline 303-874-ZTDA (5238)  · National Hope Line Network 800-SUICIDE (426-7827)

## 2017-04-10 LAB — EKG IMPRESSION: NORMAL

## 2017-05-10 NOTE — ADDENDUM NOTE
Encounter addended by: Marycruz Shabazz R.N. on: 5/10/2017  1:17 PM<BR>     Documentation filed: Inpatient Document Flowsheet

## 2017-08-18 ENCOUNTER — OFFICE VISIT (OUTPATIENT)
Dept: CARDIOLOGY | Facility: MEDICAL CENTER | Age: 67
End: 2017-08-18
Payer: MEDICARE

## 2017-08-18 VITALS
BODY MASS INDEX: 40.5 KG/M2 | WEIGHT: 252 LBS | SYSTOLIC BLOOD PRESSURE: 120 MMHG | HEART RATE: 74 BPM | DIASTOLIC BLOOD PRESSURE: 70 MMHG | HEIGHT: 66 IN | OXYGEN SATURATION: 95 %

## 2017-08-18 DIAGNOSIS — R93.1 ABNORMAL NUCLEAR CARDIAC IMAGING TEST: ICD-10-CM

## 2017-08-18 DIAGNOSIS — E78.5 DYSLIPIDEMIA: ICD-10-CM

## 2017-08-18 DIAGNOSIS — I10 ESSENTIAL HYPERTENSION: ICD-10-CM

## 2017-08-18 DIAGNOSIS — R07.89 OTHER CHEST PAIN: Primary | ICD-10-CM

## 2017-08-18 DIAGNOSIS — E66.01 MORBID OBESITY DUE TO EXCESS CALORIES (HCC): ICD-10-CM

## 2017-08-18 PROBLEM — R07.9 CHEST PAIN, RULE OUT ACUTE MYOCARDIAL INFARCTION: Status: RESOLVED | Noted: 2017-03-19 | Resolved: 2017-08-18

## 2017-08-18 PROCEDURE — 99204 OFFICE O/P NEW MOD 45 MIN: CPT | Performed by: INTERNAL MEDICINE

## 2017-08-18 RX ORDER — FERROUS GLUCONATE 324(38)MG
324 TABLET ORAL
COMMUNITY
End: 2021-03-05

## 2017-08-18 ASSESSMENT — ENCOUNTER SYMPTOMS
SENSORY CHANGE: 1
COUGH: 1
LOSS OF CONSCIOUSNESS: 1

## 2017-08-18 NOTE — Clinical Note
Name:          Servando Durham   YOB: 1950  Date:     8/18/2017      Luis Antonio Morales, PHILIP.AIDAN.  [address]  [place] [address]     John Lopez MD  [address] [facility], [facility] [address]  [place] [address]  Phone: [phone]  Back Line: [phone]  Fax: [phone]  E-mail: [email or web address]   Dear Dr. Morales,    We had the pleasure of seeing your patient, Servando Durham, in Cardiology Clinic at [facility] today.    As you know, he is a 67-year-old man with obesity, diabetes, hypertension, dyslipidemia, and previous presentations for chest pain atypical for angina but also found to have an inferior defect on myocardial perfusion imaging likely artifactual.    His blood pressure is well controlled on metoprolol and lisinopril measured in our office today at 120/70 mmHg. His LDL of 86 mg/dL on lovastatin is probably acceptable. However, in reviewing his records with some mild systolic dysfunction on his echocardiogram in 2014 and his borderline result of myocardial perfusion imaging from March I do think that we should further risk stratify him especially in the context of multiple risk factors for obstructive coronary disease with a CT coronary calcium scan. I did order that CT coronary calcium scan. I probably would not choose angiography given very little burden of chest discomfort even if he does have significant coronary calcifications. Rather, I would increase the intensity of his statin therapy.    We will have the patient follow-up in 3 months to review again the results of the CT coronary calcium score.    Thank you for the referral and please do not hesitate to contact me at any time. My contact information is listed above.    This note was dictated using Dragon speech recognition software.     A full note including my physical examination and a full list of rectified medications is available in our medical record, and can be faxed as well.    John Lopez MD  Cardiologist  [facility]  [facility]
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
## 2017-08-18 NOTE — MR AVS SNAPSHOT
"        Servando Durham   2017 10:00 AM   Office Visit   MRN: [id number]    Department:  [facility]   Dept Phone:  [phone]    Description:  Male : 1950   Provider:  John Lpoez M.D.           Reason for Visit     New Patient           Allergies as of 2017     Allergen Noted Reactions    Banana 10/23/2013   Swelling    Pt reports when bananas are consumed pt develops an itchy and swollen mouth and eyes water.      Morphine 2011   Itching    \"I break out in a rash\"       You were diagnosed with     Other chest pain   [786.59.ICD-9-CM]  -  Primary     Dyslipidemia   [753587]       Essential hypertension   [6651798]       Morbid obesity due to excess calories (CMS-HCC)   [7694067]       Abnormal nuclear cardiac imaging test   [135349]         Vital Signs     Blood Pressure Pulse Height Weight Body Mass Index Oxygen Saturation    120/70 mmHg 74 1.676 m (5' 6\") 114.306 kg (252 lb) 40.69 kg/m2 95%    Smoking Status                   Former Smoker           Basic Information     Date Of Birth Sex Race Ethnicity Preferred Language    1950 Male  or  Non- English      Your appointments     Sep 05, 2017  1:30 PM   CT-CARDIAC SCORING with [facility] CT 1   IMAGING [facility]    Imaging [place]  [address]  [address]  [place] [id number] [phone]           Some exams require specific prep instructions that would have been given to you at time of scheduling. If you have any additional questions about the prep instructions, please call Imaging Scheduling at [phone] and press #2.            Sep 08, 2017 10:20 AM   New Patient with C Rotation   [facility] (--)    [address]  [place] [address] [phone]           Please bring enclosed paperwork completed along with your insurance card and photo ID.            2017 10:45 AM   FOLLOW UP with John Lopez, " M.D.   [facility] (--)    [address]  [place] [id number] [phone]              Problem List              ICD-10-CM Priority Class Noted - Resolved    Respiratory failure (CMS-HCC) J96.90 High  12/27/2011 - Present    Morbid obesity (CMS-HCC) E66.01 Low  12/27/2011 - Present    Essential hypertension I10 Low  12/27/2011 - Present    DM (diabetes mellitus) (CMS-HCC) E11.9 Medium  12/27/2011 - Present    Syncope R55   4/6/2012 - Present    Post-ictal coma (CMS-HCC) G40.301   4/6/2012 - Present    Atypical Seizure R56.9   4/6/2012 - Present    Hx of AKA (above knee amputation) (CMS-HCC) Z89.619   10/23/2013 - Present    History of alcohol abuse Z87.898   2/4/2014 - Present    Leg pain M79.606   2/4/2014 - Present    Fall W19.XXXA   2/4/2014 - Present    Type II diabetes mellitus (CMS-HCC) E11.9   6/21/2014 - Present    Normocytic anemia D64.9   11/25/2014 - Present    Abnormal nuclear cardiac imaging test R93.1   8/18/2017 - Present      Health Maintenance     Patient has no pending health maintenance at this time      Current Immunizations     Influenza TIV (IM) 10/15/2014, 10/22/2013, 11/16/2011  6:12 PM    Influenza Vaccine 10/1/2011    Pneumococcal Vaccine (UF)Historical Data 10/1/2011    Pneumococcal polysaccharide vaccine (PPSV-23) 11/16/2011  5:00 PM      Below and/or attached are the medications your provider expects you to take. Review all of your home medications and newly ordered medications with your provider and/or pharmacist. Follow medication instructions as directed by your provider and/or pharmacist. Please keep your medication list with you and share with your provider. Update the information when medications are discontinued, doses are changed, or new medications (including over-the-counter products) are added; and carry medication information at all times in the event of emergency situations     Allergies:  BANANA - Swelling     MORPHINE -  Itching               Medications  Valid as of: August 18, 2017 - 11:05 AM    Generic Name Brand Name Tablet Size Instructions for use    Aspirin (Tablet Delayed Response) ECOTRIN 81 MG Take 81 mg by mouth every day.        Calcium Carbonate Antacid (Chew Tab) TUMS 500 MG Take 500 mg by mouth 1 time daily as needed.        Cyanocobalamin   Take  by mouth.        Esomeprazole Magnesium   Take  by mouth.        Ferrous Gluconate (Tab) FERGON 324 (38 Fe) MG Take 324 mg by mouth every morning with breakfast.        Ibuprofen (Tab) MOTRIN 800 MG Take 800 mg by mouth every 8 hours as needed (Leg pain).        Lisinopril (Tab) PRINIVIL, ZESTRIL 40 MG Take 40 mg by mouth every day.        Lovastatin (Tab) MEVACOR 20 MG Take 20 mg by mouth every day.        Metoprolol Tartrate (Tab) LOPRESSOR 25 MG Take 25 mg by mouth 2 times a day.        .                 Medicines prescribed today were sent to:     None      Medication refill instructions:       If your prescription bottle indicates you have medication refills left, it is not necessary to call your provider’s office. Please contact your pharmacy and they will refill your medication.    If your prescription bottle indicates you do not have any refills left, you may request refills at any time through one of the following ways: The online MycooN system (except Urgent Care), by calling your provider’s office, or by asking your pharmacy to contact your provider’s office with a refill request. Medication refills are processed only during regular business hours and may not be available until the next business day. Your provider may request additional information or to have a follow-up visit with you prior to refilling your medication.   *Please Note: Medication refills are assigned a new Rx number when refilled electronically. Your pharmacy may indicate that no refills were authorized even though a new prescription for the same medication is available at the pharmacy. Please  request the medicine by name with the pharmacy before contacting your provider for a refill.        Your To Do List     Future Labs/Procedures Complete By Expires    CT-CARDIAC SCORING  As directed 8/18/2018         Monetsu Access Code: [email or web address]  Expires: 9/17/2017 10:58 AM    Monetsu  A secure, online tool to manage your health information     [facility]’s Monetsu® is a secure, online tool that connects you to your personalized health information from the privacy of your home -- day or night - making it very easy for you to manage your healthcare. Once the activation process is completed, you can even access your medical information using the Monetsu jaya, which is available for free in the Apple Jaya store or Google Play store.     Monetsu provides the following levels of access (as shown below):   My Chart Features   [facility]  Specialists [facility]  Urgent  Care [facility]  Primary Care  Doctor   Email your healthcare team securely and privately 24/7 X X X    Manage appointments: schedule your next appointment; view details of past/upcoming appointments X      Request prescription refills. X      View recent personal medical records, including lab and immunizations X X X X   View health record, including health history, allergies, medications X X X X   Read reports about your outpatient visits, procedures, consult and ER notes X X X X   See your discharge summary, which is a recap of your hospital and/or ER visit that includes your diagnosis, lab results, and care plan. X X       How to register for Monetsu:  1. Go to  [email or web address].  2. Click on the Sign Up Now box, which takes you to the New Member Sign Up page. You will need to provide the following information:  a. Enter your Monetsu Access Code exactly as it appears at the top of this page. (You will not need to use this code after you’ve completed the sign-up process. If you do not sign up before the expiration  date, you must request a new code.)   b. Enter your date of birth.   c. Enter your home email address.   d. Click Submit, and follow the next screen’s instructions.  3. Create a SearchForce ID. This will be your SearchForce login ID and cannot be changed, so think of one that is secure and easy to remember.  4. Create a Acert password. You can change your password at any time.  5. Enter your Password Reset Question and Answer. This can be used at a later time if you forget your password.   6. Enter your e-mail address. This allows you to receive e-mail notifications when new information is available in SearchForce.  7. Click Sign Up. You can now view your health information.    For assistance activating your SearchForce account, call [phone]

## 2017-08-18 NOTE — PROGRESS NOTES
Name:          Servando Durham   YOB: 1950  Date:     8/18/2017      Luis Antonio Morales, PHILIP.AIDAN.  [address]  [place] [address]     John Lopez MD  [address] [address]  [place] [address]  Phone: [phone]  Back Line: [phone]  Fax: [phone]  E-mail: [email or web address]   Dear Dr. Morales,    We had the pleasure of seeing your patient, Servando Durham, in Cardiology Clinic at [facility] today.    As you know, he is a 67-year-old man with obesity, diabetes, hypertension, dyslipidemia, and previous presentations for chest pain atypical for angina but also found to have an inferior defect on myocardial perfusion imaging likely artifactual.    His blood pressure is well controlled on metoprolol and lisinopril measured in our office today at 120/70 mmHg. His LDL of 86 mg/dL on lovastatin is probably acceptable. However, in reviewing his records with some mild systolic dysfunction on his echocardiogram in 2014 and his borderline result of myocardial perfusion imaging from March I do think that we should further risk stratify him especially in the context of multiple risk factors for obstructive coronary disease with a CT coronary calcium scan. I did order that CT coronary calcium scan. I probably would not choose angiography given very little burden of chest discomfort even if he does have significant coronary calcifications. Rather, I would increase the intensity of his statin therapy.    We will have the patient follow-up in 3 months to review again the results of the CT coronary calcium score.    Thank you for the referral and please do not hesitate to contact me at any time. My contact information is listed above.    This note was dictated using Dragon speech recognition software.     A full note including my physical examination and a full list of rectified medications is available in our medical record, and can be faxed as well.    John Lopez MD  Cardiologist  [facility]  [facility]

## 2017-09-08 ENCOUNTER — SLEEP CENTER VISIT (OUTPATIENT)
Dept: SLEEP MEDICINE | Facility: MEDICAL CENTER | Age: 67
End: 2017-09-08
Payer: MEDICARE

## 2017-09-08 VITALS
DIASTOLIC BLOOD PRESSURE: 74 MMHG | TEMPERATURE: 97.9 F | HEART RATE: 64 BPM | OXYGEN SATURATION: 94 % | SYSTOLIC BLOOD PRESSURE: 112 MMHG | HEIGHT: 66 IN | BODY MASS INDEX: 41.3 KG/M2 | RESPIRATION RATE: 18 BRPM | WEIGHT: 257 LBS

## 2017-09-08 DIAGNOSIS — G47.33 OSA (OBSTRUCTIVE SLEEP APNEA): ICD-10-CM

## 2017-09-08 DIAGNOSIS — I10 ESSENTIAL HYPERTENSION: ICD-10-CM

## 2017-09-08 DIAGNOSIS — E66.01 MORBID OBESITY DUE TO EXCESS CALORIES (HCC): ICD-10-CM

## 2017-09-08 DIAGNOSIS — Z89.612 HX OF AKA (ABOVE KNEE AMPUTATION), LEFT (HCC): ICD-10-CM

## 2017-09-08 PROCEDURE — 99204 OFFICE O/P NEW MOD 45 MIN: CPT | Performed by: INTERNAL MEDICINE

## 2017-09-08 RX ORDER — ZOLPIDEM TARTRATE 5 MG/1
TABLET ORAL
Qty: 3 TAB | Refills: 0 | Status: SHIPPED | OUTPATIENT
Start: 2017-09-08

## 2017-09-08 RX ORDER — SIMVASTATIN 10 MG
10 TABLET ORAL NIGHTLY
COMMUNITY
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
## 2017-09-08 NOTE — PATIENT INSTRUCTIONS
1. KEITH (obstructive sleep apnea)  - POLYSOMNOGRAPHY, 4 OR MORE; Future  - zolpidem (AMBIEN) 5 MG Tab; Take 1-2 tablets by mouth every evening as needed for insomnia. Bring to sleep study.  Dispense: 3 Tab; Refill: 0  2. Morbid obesity due to excess calories (CMS-HCC)  3. Essential hypertension        PLAN:   The patient has signs and symptoms consistent with obstructive sleep apnea hypopnea syndrome. Will schedule to have a nocturnal polysomnogram using zolpidem to assist with sleep onset and maintenance should the need arise. Will return after the results are available to determine further diagnostic needs and/or treatment options.  The risks of untreated sleep apnea were discussed with the patient at length. Patients with KEITH are at increased risk of cardiovascular disease including coronary artery disease, systemic arterial hypertension, pulmonary arterial hypertension, cardiac arrythmias, and stroke. KEITH patients have an increased risk of motor vehicle accidents, type 2 diabetes, chronic kidney disease, and non-alcoholic liver disease. The patient was advised to avoid driving a motor vehicle when drowsy.    Positive airway pressure, such as CPAP, is considered first-line and preferred therapy for sleep apnea and may reverse both symptoms and risks

## 2017-11-02 ENCOUNTER — TELEPHONE (OUTPATIENT)
Dept: CARDIOLOGY | Facility: MEDICAL CENTER | Age: 67
End: 2017-11-02

## 2017-11-02 NOTE — TELEPHONE ENCOUNTER
----- Message from Marla Mcfarland, Med Ass't sent at 11/2/2017 10:57 AM PDT -----  Regarding: FW: [facility] is asking for a call back  I already called back and she just wanted IA to know that if he wanted to prescribe Crestor they are able to supply it at the clinic    ----- Message -----  From: Donna Reyna  Sent: 11/2/2017   9:18 AM  To: Marla Mcfarland, Med Ass't  Subject: [facility] is asking for a call #    Nakia Gutiérrez (pharmacist) at [facility] is asking for a call back. She can be reached at [phone].

## 2017-11-07 NOTE — DISCHARGE INSTRUCTIONS
"Alcohol Intoxication  Alcohol intoxication occurs when the amount of alcohol that a person has consumed impairs his or her ability to mentally and physically function. Alcohol directly impairs the normal chemical activity of the brain. Drinking large amounts of alcohol can lead to changes in mental function and behavior, and it can cause many physical effects that can be harmful.   Alcohol intoxication can range in severity from mild to very severe. Various factors can affect the level of intoxication that occurs, such as the person's age, gender, weight, frequency of alcohol consumption, and the presence of other medical conditions (such as diabetes, seizures, or heart conditions). Dangerous levels of alcohol intoxication may occur when people drink large amounts of alcohol in a short period (binge drinking). Alcohol can also be especially dangerous when combined with certain prescription medicines or \"recreational\" drugs.  SIGNS AND SYMPTOMS  Some common signs and symptoms of mild alcohol intoxication include:  · Loss of coordination.  · Changes in mood and behavior.  · Impaired judgment.  · Slurred speech.  As alcohol intoxication progresses to more severe levels, other signs and symptoms will appear. These may include:  · Vomiting.  · Confusion and impaired memory.  · Slowed breathing.  · Seizures.  · Loss of consciousness.  DIAGNOSIS   Your health care provider will take a medical history and perform a physical exam. You will be asked about the amount and type of alcohol you have consumed. Blood tests will be done to measure the concentration of alcohol in your blood. In many places, your blood alcohol level must be lower than 80 mg/dL (0.08%) to legally drive. However, many dangerous effects of alcohol can occur at much lower levels.   TREATMENT   People with alcohol intoxication often do not require treatment. Most of the effects of alcohol intoxication are temporary, and they go away as the alcohol naturally " Shnatasha leaves the body. Your health care provider will monitor your condition until you are stable enough to go home. Fluids are sometimes given through an IV access tube to help prevent dehydration.   HOME CARE INSTRUCTIONS  · Do not drive after drinking alcohol.  · Stay hydrated. Drink enough water and fluids to keep your urine clear or pale yellow. Avoid caffeine.    · Only take over-the-counter or prescription medicines as directed by your health care provider.    SEEK MEDICAL CARE IF:   · You have persistent vomiting.    · You do not feel better after a few days.  · You have frequent alcohol intoxication. Your health care provider can help determine if you should see a substance use treatment counselor.  SEEK IMMEDIATE MEDICAL CARE IF:   · You become shaky or tremble when you try to stop drinking.    · You shake uncontrollably (seizure).    · You throw up (vomit) blood. This may be bright red or may look like black coffee grounds.    · You have blood in your stool. This may be bright red or may appear as a black, tarry, bad smelling stool.    · You become lightheaded or faint.    MAKE SURE YOU:   · Understand these instructions.  · Will watch your condition.  · Will get help right away if you are not doing well or get worse.     This information is not intended to replace advice given to you by your health care provider. Make sure you discuss any questions you have with your health care provider.     Document Released: 09/27/2006 Document Revised: 08/20/2014 Document Reviewed: 05/23/2014  [employer] Patient Education ©2016 [employer]

## 2018-03-21 ENCOUNTER — HOSPITAL ENCOUNTER (OUTPATIENT)
Dept: RADIOLOGY | Facility: MEDICAL CENTER | Age: 68
End: 2018-03-21
Attending: NURSE PRACTITIONER
Payer: MEDICARE

## 2018-03-21 DIAGNOSIS — R19.00 ABDOMINAL MASS, UNSPECIFIED ABDOMINAL LOCATION: ICD-10-CM

## 2018-03-21 PROCEDURE — 76700 US EXAM ABDOM COMPLETE: CPT

## 2018-05-17 PROCEDURE — 99285 EMERGENCY DEPT VISIT HI MDM: CPT

## 2018-05-17 ASSESSMENT — PAIN SCALES - GENERAL: PAINLEVEL_OUTOF10: 10

## 2018-05-18 ENCOUNTER — HOSPITAL ENCOUNTER (EMERGENCY)
Facility: MEDICAL CENTER | Age: 68
End: 2018-05-18
Attending: EMERGENCY MEDICINE
Payer: MEDICARE

## 2018-05-18 ENCOUNTER — APPOINTMENT (OUTPATIENT)
Dept: RADIOLOGY | Facility: MEDICAL CENTER | Age: 68
End: 2018-05-18
Attending: EMERGENCY MEDICINE
Payer: MEDICARE

## 2018-05-18 VITALS
WEIGHT: 246 LBS | TEMPERATURE: 97 F | BODY MASS INDEX: 39.53 KG/M2 | HEIGHT: 66 IN | DIASTOLIC BLOOD PRESSURE: 68 MMHG | RESPIRATION RATE: 16 BRPM | SYSTOLIC BLOOD PRESSURE: 145 MMHG | OXYGEN SATURATION: 98 % | HEART RATE: 56 BPM

## 2018-05-18 DIAGNOSIS — S39.012A ACUTE MYOFASCIAL STRAIN OF LUMBAR REGION, INITIAL ENCOUNTER: ICD-10-CM

## 2018-05-18 LAB
ALBUMIN SERPL BCP-MCNC: 4.2 G/DL (ref 3.2–4.9)
ALBUMIN/GLOB SERPL: 1.3 G/DL
ALP SERPL-CCNC: 94 U/L (ref 30–99)
ALT SERPL-CCNC: 16 U/L (ref 2–50)
ANION GAP SERPL CALC-SCNC: 8 MMOL/L (ref 0–11.9)
APPEARANCE UR: CLEAR
AST SERPL-CCNC: 16 U/L (ref 12–45)
BASOPHILS # BLD AUTO: 1.2 % (ref 0–1.8)
BASOPHILS # BLD: 0.12 K/UL (ref 0–0.12)
BILIRUB SERPL-MCNC: 0.4 MG/DL (ref 0.1–1.5)
BILIRUB UR QL STRIP.AUTO: NEGATIVE
BUN SERPL-MCNC: 17 MG/DL (ref 8–22)
CALCIUM SERPL-MCNC: 9.8 MG/DL (ref 8.5–10.5)
CHLORIDE SERPL-SCNC: 104 MMOL/L (ref 96–112)
CO2 SERPL-SCNC: 24 MMOL/L (ref 20–33)
COLOR UR: YELLOW
CREAT SERPL-MCNC: 1.1 MG/DL (ref 0.5–1.4)
EOSINOPHIL # BLD AUTO: 0.84 K/UL (ref 0–0.51)
EOSINOPHIL NFR BLD: 8.4 % (ref 0–6.9)
ERYTHROCYTE [DISTWIDTH] IN BLOOD BY AUTOMATED COUNT: 42.7 FL (ref 35.9–50)
GLOBULIN SER CALC-MCNC: 3.2 G/DL (ref 1.9–3.5)
GLUCOSE SERPL-MCNC: 102 MG/DL (ref 65–99)
GLUCOSE UR STRIP.AUTO-MCNC: NEGATIVE MG/DL
HCT VFR BLD AUTO: 46.4 % (ref 42–52)
HGB BLD-MCNC: 15.5 G/DL (ref 14–18)
IMM GRANULOCYTES # BLD AUTO: 0.04 K/UL (ref 0–0.11)
IMM GRANULOCYTES NFR BLD AUTO: 0.4 % (ref 0–0.9)
KETONES UR STRIP.AUTO-MCNC: NEGATIVE MG/DL
LEUKOCYTE ESTERASE UR QL STRIP.AUTO: NEGATIVE
LIPASE SERPL-CCNC: 21 U/L (ref 11–82)
LYMPHOCYTES # BLD AUTO: 3.05 K/UL (ref 1–4.8)
LYMPHOCYTES NFR BLD: 30.4 % (ref 22–41)
MCH RBC QN AUTO: 29.6 PG (ref 27–33)
MCHC RBC AUTO-ENTMCNC: 33.4 G/DL (ref 33.7–35.3)
MCV RBC AUTO: 88.5 FL (ref 81.4–97.8)
MICRO URNS: NORMAL
MONOCYTES # BLD AUTO: 0.65 K/UL (ref 0–0.85)
MONOCYTES NFR BLD AUTO: 6.5 % (ref 0–13.4)
NEUTROPHILS # BLD AUTO: 5.34 K/UL (ref 1.82–7.42)
NEUTROPHILS NFR BLD: 53.1 % (ref 44–72)
NITRITE UR QL STRIP.AUTO: NEGATIVE
NRBC # BLD AUTO: 0 K/UL
NRBC BLD-RTO: 0 /100 WBC
PH UR STRIP.AUTO: 6.5 [PH]
PLATELET # BLD AUTO: 249 K/UL (ref 164–446)
PMV BLD AUTO: 9.6 FL (ref 9–12.9)
POTASSIUM SERPL-SCNC: 4.1 MMOL/L (ref 3.6–5.5)
PROT SERPL-MCNC: 7.4 G/DL (ref 6–8.2)
PROT UR QL STRIP: NEGATIVE MG/DL
RBC # BLD AUTO: 5.24 M/UL (ref 4.7–6.1)
RBC UR QL AUTO: NEGATIVE
SODIUM SERPL-SCNC: 136 MMOL/L (ref 135–145)
SP GR UR STRIP.AUTO: 1.02
UROBILINOGEN UR STRIP.AUTO-MCNC: 0.2 MG/DL
WBC # BLD AUTO: 10 K/UL (ref 4.8–10.8)

## 2018-05-18 PROCEDURE — 74176 CT ABD & PELVIS W/O CONTRAST: CPT

## 2018-05-18 PROCEDURE — 83690 ASSAY OF LIPASE: CPT

## 2018-05-18 PROCEDURE — 96374 THER/PROPH/DIAG INJ IV PUSH: CPT

## 2018-05-18 PROCEDURE — 85025 COMPLETE CBC W/AUTO DIFF WBC: CPT

## 2018-05-18 PROCEDURE — 80053 COMPREHEN METABOLIC PANEL: CPT

## 2018-05-18 PROCEDURE — 81003 URINALYSIS AUTO W/O SCOPE: CPT

## 2018-05-18 PROCEDURE — 700111 HCHG RX REV CODE 636 W/ 250 OVERRIDE (IP): Performed by: EMERGENCY MEDICINE

## 2018-05-18 PROCEDURE — 700105 HCHG RX REV CODE 258: Performed by: EMERGENCY MEDICINE

## 2018-05-18 PROCEDURE — 36415 COLL VENOUS BLD VENIPUNCTURE: CPT

## 2018-05-18 RX ORDER — SODIUM CHLORIDE 9 MG/ML
INJECTION, SOLUTION INTRAVENOUS CONTINUOUS
Status: DISCONTINUED | OUTPATIENT
Start: 2018-05-18 | End: 2018-05-18 | Stop reason: HOSPADM

## 2018-05-18 RX ADMIN — SODIUM CHLORIDE: 9 INJECTION, SOLUTION INTRAVENOUS at 01:18

## 2018-05-18 RX ADMIN — FENTANYL CITRATE 100 MCG: 50 INJECTION INTRAMUSCULAR; INTRAVENOUS at 01:18

## 2018-05-18 ASSESSMENT — PAIN SCALES - GENERAL: PAINLEVEL_OUTOF10: 10

## 2018-05-18 NOTE — ED NOTES
Pt w/c to [facility]. Pt changed into gown and placed on monitor.  Agree with triage note.   Chart up and ready for ERP now.

## 2018-05-18 NOTE — ED PROVIDER NOTES
ED Provider Note    CHIEF COMPLAINT  Chief Complaint   Patient presents with   • Flank Pain     awoke with LT sided flank pain, worsened throughout day, especially with walking.    • Abdominal Pain     off and on >1 month; had US done, hasn't received results. states it's worse today.        HPI  Servando Durham is a 67 y.o. male who presents with left flank pain. The patient states that the pain over the last couple of days. He states the pain is intermittent and described as sharp. He states the pain is moderate to severe in intensity. He states it is worse with certain movements. He has not had any associated vomiting. He also states he has not had any fevers. The patient is unaware of any hematuria and he also denies dysuria. Currently his pain is moderate in intensity. He states he is also some periodic anterior abdominal pain over the last couple months. States he had an ultrasound performed recently but has not followed up on the results.     REVIEW OF SYSTEMS  See HPI for further details. All other systems are negative.     PAST MEDICAL HISTORY  Past Medical History:   Diagnosis Date   • Arthritis     right leg   • Arthritis    • Aspiration pneumonia (CMS-[facility]) 12/27/2011   • Backpain    • Congestive heart failure (CMS-[facility])    • Daytime sleepiness    • Diabetes (CMS-[facility])    • ETOH abuse    • Fall    • Glaucoma     left eye   • Glaucoma    • Hypertension    • Indigestion    • Sleep apnea    • Wears glasses        SOCIAL HISTORY  Social History     Social History   • Marital status: Single     Spouse name: N/A   • Number of children: N/A   • Years of education: N/A     Social History Main Topics   • Smoking status: Former Smoker     Packs/day: 0.25     Years: 35.00     Quit date: 4/18/2017   • Smokeless tobacco: Never Used   • Alcohol use No      Comment: not using since a year per pt    • Drug use: No   • Sexual activity: Not on file     Other Topics Concern   • Not on file     Social History Narrative    **  "Merged History Encounter **         ** Merged History Encounter **                PHYSICAL EXAM  VITAL SIGNS: /68   Pulse (!) 56   Temp 36.1 °C (97 °F)   Resp 14   Ht 1.676 m (5' 6\")   Wt 111.6 kg (246 lb)   SpO2 93%   BMI 39.71 kg/m²   Constitutional: Chronically ill in appearance and in mild distress.   HENT: Normocephalic, Atraumatic, tympanic membranes are intact and nonerythematous bilaterally, Oropharynx moist without exudates or erythema, Nose normal.   Eyes: PERRLA, EOMI, Conjunctiva normal.  Neck: Supple without meningismus  Lymphatic: No lymphadenopathy noted.   Cardiovascular: Normal heart rate, Normal rhythm, No murmurs, No rubs, No gallops.   Thorax & Lungs: Normal breath sounds, No respiratory distress, No wheezing, No chest tenderness.   Abdomen: Mild diffuse tenderness, slight distention, hypoactive bowel sounds  Skin: Brawny changes of the right lower extremity  Back: No tenderness, No CVA tenderness.   Extremities: Left lower extremity amputation otherwise Atraumatic with symmetric distal pulses, No edema, No tenderness, No cyanosis, No clubbing.   Neurologic: Alert & oriented x 3, cranial nerves II through XII are intact, Normal motor function, Normal sensory function, No focal deficits noted.   Psychiatric: Affect normal, Judgment normal, Mood normal.     RADIOLOGY/PROCEDURES  CT-RENAL COLIC EVALUATION(A/P W/O)   Final Result      1.  No renal stone or hydronephrosis.   2.  No focal mesenteric inflammatory process.   3.  Colonic diverticulosis without evidence for diverticulitis.   4.  Normal appendix.   5.  LEFT lower abdominal wall skin thickening and subcutaneous edema suggesting inflammatory process.            COURSE & MEDICAL DECISION MAKING  Pertinent Labs & Imaging studies reviewed. (See chart for details)  This is a 67-year-old gentleman who presents with left flank discomfort. The CT scan does not show any evidence of acute intra-abdominal inflammation. His urinalysis does " not support pyelonephritis or ureterolithiasis. I suspect this pain is more from a lumbar strain. He does have recurrent abdominal pain on a clear source for this abdominal pain but does not appear to be from acute surgical pathology. The patient did receive intravenous pain medication and feels better on repeat examination. He will be discharged instructions take anti-inflammatories. As for the abdominal wall thickening I do not appreciate any evidence of abscess or cellulitis clinically on my exam.    FINAL IMPRESSION  1. Low back pain suspect musculoskeletal etiology    Disposition  The patient will be discharged in stable condition    Electronically signed by: Servando Finch, 5/18/2018 12:54 AM

## 2018-05-18 NOTE — ED TRIAGE NOTES
"Chief Complaint   Patient presents with   • Flank Pain     awoke with LT sided flank pain, worsened throughout day, especially with walking.    • Abdominal Pain     off and on >1 month; had US done, hasn't received results. states it's worse today.      [facility] for above. Transferred to w/c. Denies fevers, denies dysuria, denies N/V/D. VSS. NAD noted. Pt to senior [facility]. Educated on triage process and to inform staff of any changes.     /68   Pulse 61   Temp 36.1 °C (97 °F)   Resp 14   Ht 1.676 m (5' 6\")   Wt 111.6 kg (246 lb)   SpO2 97%   BMI 39.71 kg/m²     "

## 2018-05-18 NOTE — ED NOTES
Pt discharged home. Explained discharge and medication instructions. Questions and comments addressed. Pt verbalized understanding of instructions. Pt advised to follow-up with Dr Morales or return to ED for any new or worsening of symptoms. Pt is ambulating well and steady on feet. VS stable.

## 2018-05-18 NOTE — ED NOTES
[facility] fall assessment complete, pt is HIGH risk for fall. Interventions complete. Pt placed in yellow non-slip socks, wrist band placed, green sign on door. Bed locked in low position, call light in place. Personal possessions in place.  Personal needs assessed. Charge nurse notified to move pt to a more visible room if available. Safety assessed. Will monitor frequently.

## 2019-08-19 ENCOUNTER — HOSPITAL ENCOUNTER (EMERGENCY)
Facility: MEDICAL CENTER | Age: 69
End: 2019-08-19
Attending: EMERGENCY MEDICINE
Payer: MEDICARE

## 2019-08-19 VITALS
SYSTOLIC BLOOD PRESSURE: 137 MMHG | BODY MASS INDEX: 39.7 KG/M2 | TEMPERATURE: 98 F | DIASTOLIC BLOOD PRESSURE: 60 MMHG | OXYGEN SATURATION: 97 % | HEIGHT: 66 IN | WEIGHT: 247 LBS | HEART RATE: 60 BPM | RESPIRATION RATE: 18 BRPM

## 2019-08-19 DIAGNOSIS — G43.009 MIGRAINE WITHOUT AURA AND WITHOUT STATUS MIGRAINOSUS, NOT INTRACTABLE: ICD-10-CM

## 2019-08-19 PROCEDURE — 99284 EMERGENCY DEPT VISIT MOD MDM: CPT

## 2019-08-19 PROCEDURE — 96375 TX/PRO/DX INJ NEW DRUG ADDON: CPT

## 2019-08-19 PROCEDURE — 700105 HCHG RX REV CODE 258: Performed by: EMERGENCY MEDICINE

## 2019-08-19 PROCEDURE — 96374 THER/PROPH/DIAG INJ IV PUSH: CPT

## 2019-08-19 PROCEDURE — 700111 HCHG RX REV CODE 636 W/ 250 OVERRIDE (IP): Performed by: EMERGENCY MEDICINE

## 2019-08-19 RX ORDER — METOCLOPRAMIDE HYDROCHLORIDE 5 MG/ML
10 INJECTION INTRAMUSCULAR; INTRAVENOUS ONCE
Status: COMPLETED | OUTPATIENT
Start: 2019-08-19 | End: 2019-08-19

## 2019-08-19 RX ORDER — ONDANSETRON 2 MG/ML
4 INJECTION INTRAMUSCULAR; INTRAVENOUS ONCE
Status: COMPLETED | OUTPATIENT
Start: 2019-08-19 | End: 2019-08-19

## 2019-08-19 RX ORDER — SODIUM CHLORIDE 9 MG/ML
1000 INJECTION, SOLUTION INTRAVENOUS ONCE
Status: COMPLETED | OUTPATIENT
Start: 2019-08-19 | End: 2019-08-19

## 2019-08-19 RX ADMIN — SODIUM CHLORIDE 1000 ML: 9 INJECTION, SOLUTION INTRAVENOUS at 19:39

## 2019-08-19 RX ADMIN — ONDANSETRON 4 MG: 2 INJECTION INTRAMUSCULAR; INTRAVENOUS at 19:37

## 2019-08-19 RX ADMIN — METOCLOPRAMIDE 10 MG: 5 INJECTION, SOLUTION INTRAMUSCULAR; INTRAVENOUS at 19:37

## 2019-08-19 RX ADMIN — FENTANYL CITRATE 50 MCG: 0.05 INJECTION, SOLUTION INTRAMUSCULAR; INTRAVENOUS at 19:38

## 2019-08-20 NOTE — ED NOTES
Assumed care, report received from Saturnino HENDERSON, POC discussed.   Introduced self as RN, call light within reach, no s/s of distress noted.

## 2019-08-20 NOTE — DISCHARGE INSTRUCTIONS
Return if you have new or different headache, confusion, vision changes, neck stiffness, or fever. Have your blood pressure rechecked by your doctor this week.

## 2019-08-20 NOTE — ED NOTES
DC home with written and verbal instructions regarding f/u, activity and RX.  Verbalized understanding, ambulated out with cane and friend.

## 2019-08-20 NOTE — ED PROVIDER NOTES
ED Provider Note    Scribed for Philipp Staton M.D. by Javon Francois. 8/19/2019, 7:00 PM.    Primary care provider: KAY Lopez  Means of arrival: EMS  History obtained from: Patient  History limited by: None    CHIEF COMPLAINT  Chief Complaint   Patient presents with   • Headache       HPI  Servando Durham is a 69 y.o. male with a history of hypertension and migraines who presents to the Emergency Department after being sent here by [facility]. He states that he was seen at the outside facility for complaints of a headache and while there his blood pressure was noted to be elevated. Servando was sent here as they were unable to perform the necessary imaging at the outside center. He describes his symptoms as a constant headache which onset two days and is present to the frontal portion of his head. He states that the headache had a sudden and severe onset which he claims was a 10/10 pain and was experiencing auras in his vision. His headache is similar to his previous migraines. Servando is also experiencing associated nausea, photophobia and neck pain. He has tried taking medications to treat the headache with little to no alleviation of his symptoms. Servando also notes he has had some difficulty thinking of words, and trying to speak however denies any recent falls or head injuries. He denies any weakness, numbness, vomiting or chest pain. Finally, Servando notes that he has been slightly short of breath but used to be on oxygen and has a history of CHF.    REVIEW OF SYSTEMS  Pertinent positives include headache, nausea, photophobia, neck pain, dysarthria, shortness of breath. Pertinent negatives include numbness, weakness, vomiting, chest pain. All other systems negative.    PAST MEDICAL HISTORY   has a past medical history of Arthritis, Arthritis, Aspiration pneumonia (CMS-HCC) (12/27/2011), Backpain, Congestive heart failure (CMS-[facility]), Daytime sleepiness, Diabetes (CMS-[facility]), ETOH abuse, Fall,  Glaucoma, Glaucoma, Hypertension, Indigestion, Sleep apnea, and Wears glasses.    SURGICAL HISTORY   has a past surgical history that includes other; other orthopedic surgery; other (1970); and amputation low leg thru tib/fib.    SOCIAL HISTORY  Social History     Tobacco Use   • Smoking status: Former Smoker     Packs/day: 0.25     Years: 35.00     Pack years: 8.75     Last attempt to quit: 2017     Years since quittin.3   • Smokeless tobacco: Never Used   Substance Use Topics   • Alcohol use: No     Comment: not using since a year per pt    • Drug use: No      Social History     Substance and Sexual Activity   Drug Use No       FAMILY HISTORY  Family History   Problem Relation Age of Onset   • No Known Problems Mother    • No Known Problems Father    • Heart Disease Neg Hx        CURRENT MEDICATIONS  No current facility-administered medications on file prior to encounter.      Current Outpatient Medications on File Prior to Encounter   Medication Sig Dispense Refill   • simvastatin (ZOCOR) 10 MG Tab Take 10 mg by mouth every evening.     • zolpidem (AMBIEN) 5 MG Tab Take 1-2 tablets by mouth every evening as needed for insomnia. Bring to sleep study. 3 Tab 0   • Cyanocobalamin (VITAMIN B-12 PO) Take 100 mg by mouth.     • ferrous gluconate (FERGON) 324 (38 Fe) MG Tab Take 324 mg by mouth every morning with breakfast.     • Esomeprazole Magnesium (NEXIUM PO) Take  by mouth.     • calcium carbonate (TUMS) 500 MG Chew Tab Take 500 mg by mouth 1 time daily as needed.     • ibuprofen (MOTRIN) 800 MG Tab Take 800 mg by mouth every 8 hours as needed (Leg pain).     • lovastatin (MEVACOR) 20 MG Tab Take 20 mg by mouth every day.     • aspirin EC (ECOTRIN) 81 MG TBEC Take 81 mg by mouth every day.     • metoprolol (LOPRESSOR) 25 MG TABS Take 25 mg by mouth 2 times a day.     • lisinopril (PRINIVIL, ZESTRIL) 40 MG tablet Take 40 mg by mouth every day.         ALLERGIES  Allergies   Allergen Reactions   • Banana  "Swelling     Pt reports when bananas are consumed pt develops an itchy and swollen mouth and eyes water.     • Morphine Itching     \"I break out in a rash\"        PHYSICAL EXAM  VITAL SIGNS: /81   Pulse 61   Temp 36.7 °C (98.1 °F) (Oral)   Resp 18   Ht 1.676 m (5' 6\")   Wt 112 kg (247 lb)   BMI 39.87 kg/m²     Constitutional: Well developed, Well nourished, mild distress.   HENT: Normocephalic, Atraumatic  Eyes: Conjunctiva normal, No discharge. EOMI  Neck: Supple, No stridor, no    Cardiovascular: Normal heart rate, Normal rhythm, No murmurs, equal pulses.   Pulmonary: Normal breath sounds, No respiratory distress, No wheezing, No rales, No rhonchi.  Chest: No chest wall tenderness or deformity.   Abdomen: Obese, Soft, No tenderness, No masses, no rebound, no guarding.   Musculoskeletal: No major deformities noted, No tenderness.   Skin: Warm, Dry, No erythema, No rash.   Neurologic: Alert & oriented x 3, Normal motor function,  No focal deficits noted. No facial droop, No pronator drift, 5/5 strength and sensation to bilateral upper extremities, left leg is an AKA and right leg had no drift with normal strength and sensation.  Psychiatric: Affect normal, Judgment normal, Mood normal.     COURSE & MEDICAL DECISION MAKING  Pertinent Labs & Imaging studies reviewed. (See chart for details)    7:15 PM - Patient seen and examined at bedside. Patient will be treated with 1 Liter of IV fluids, Fentanyl 50 mcg, Zofran 4 mg and Reglan 10 mg. The differential diagnoses include but are not limited to: Migraine, tension headache, intracranial hemorrhage, headache from hypertension. Discussed with the patient that because he has a previous history of migraines, and his symptoms feel like his usual migraines, I will plan to treat his symptoms with medications to treat migraines. He understands and agrees. Will continue to monitor patient here in ED.    8:25 PM - Patient reevaluated after receiving his medications " and he reports feeling vastly improved, states his headache is gone and is ready to go home. Plan for discharge.    HYDRATION: Based on the patient's presentation of Other migraine cocktail the patient was given IV fluids. IV Hydration was used because oral hydration was not adequate alone. Upon recheck following hydration, the patient was greatly improved and no longer had a headache.  HTN/IDDM FOLLOW UP:  The patient has known hypertension and is being followed by their primary care doctor    Medical Decision Making: At this point time I think patient most likely had a migraine.  Patient describes a typical aura followed by the headache.  Headache is gone away completely with migraine cocktail.  Patient does not have any neurologic deficits do not suspect stroke per.  At this point time patient will be discharged home to follow-up with his primary.  Headache not was not thunderclap not the worst headache of his life I therefore do not think this was a subarachnoid hemorrhage per    The patient will return for new or worsening symptoms and is stable at the time of discharge.    The patient is referred to a primary physician for blood pressure management, diabetic screening, and for all other preventative health concerns.    DISPOSITION:  Patient will be discharged home in stable condition.    FOLLOW UP:  KAY Lopez  [address] [place] [id number]  [phone]    Schedule an appointment as soon as possible for a visit in 1 week  To have your blood pressure rechecked.      FINAL IMPRESSION  1. Migraine without aura and without status migrainosus, not intractable          Javon COTA (Tai), am scribing for, and in the presence of, Philipp Staton M.D.    Electronically signed by: Javon Francois (Tai), 8/19/2019    Philipp COTA M.D. personally performed the services described in this documentation, as scribed by Javon Francois in my presence, and it is both accurate and  complete. C.    The note accurately reflects work and decisions made by me.  Philipp Staton  8/20/2019  2:12 AM

## 2019-08-20 NOTE — ED TRIAGE NOTES
Coming from [facility] clinic for complaints of headache and elevated blood pressure.  They are unable to do imaging.

## 2019-12-05 ENCOUNTER — HOSPITAL ENCOUNTER (EMERGENCY)
Facility: MEDICAL CENTER | Age: 69
End: 2019-12-06
Attending: EMERGENCY MEDICINE
Payer: MEDICARE

## 2019-12-05 DIAGNOSIS — L03.115 CELLULITIS OF RIGHT LOWER EXTREMITY: ICD-10-CM

## 2019-12-06 ENCOUNTER — APPOINTMENT (OUTPATIENT)
Dept: RADIOLOGY | Facility: MEDICAL CENTER | Age: 69
End: 2019-12-06
Attending: EMERGENCY MEDICINE
Payer: MEDICARE

## 2019-12-06 VITALS
DIASTOLIC BLOOD PRESSURE: 79 MMHG | HEIGHT: 66 IN | HEART RATE: 54 BPM | BODY MASS INDEX: 44.03 KG/M2 | OXYGEN SATURATION: 99 % | RESPIRATION RATE: 16 BRPM | WEIGHT: 274 LBS | SYSTOLIC BLOOD PRESSURE: 136 MMHG

## 2019-12-06 LAB
ANION GAP SERPL CALC-SCNC: 8 MMOL/L (ref 0–11.9)
APTT PPP: 29.1 SEC (ref 24.7–36)
BASOPHILS # BLD AUTO: 1.2 % (ref 0–1.8)
BASOPHILS # BLD: 0.1 K/UL (ref 0–0.12)
BUN SERPL-MCNC: 22 MG/DL (ref 8–22)
CALCIUM SERPL-MCNC: 9.4 MG/DL (ref 8.5–10.5)
CHLORIDE SERPL-SCNC: 105 MMOL/L (ref 96–112)
CO2 SERPL-SCNC: 27 MMOL/L (ref 20–33)
CREAT SERPL-MCNC: 1.08 MG/DL (ref 0.5–1.4)
EOSINOPHIL # BLD AUTO: 0.71 K/UL (ref 0–0.51)
EOSINOPHIL NFR BLD: 8.5 % (ref 0–6.9)
ERYTHROCYTE [DISTWIDTH] IN BLOOD BY AUTOMATED COUNT: 44.7 FL (ref 35.9–50)
GLUCOSE SERPL-MCNC: 112 MG/DL (ref 65–99)
HCT VFR BLD AUTO: 44 % (ref 42–52)
HGB BLD-MCNC: 14.6 G/DL (ref 14–18)
IMM GRANULOCYTES # BLD AUTO: 0.02 K/UL (ref 0–0.11)
IMM GRANULOCYTES NFR BLD AUTO: 0.2 % (ref 0–0.9)
INR PPP: 0.92 (ref 0.87–1.13)
LYMPHOCYTES # BLD AUTO: 2.74 K/UL (ref 1–4.8)
LYMPHOCYTES NFR BLD: 32.7 % (ref 22–41)
MCH RBC QN AUTO: 30.8 PG (ref 27–33)
MCHC RBC AUTO-ENTMCNC: 33.2 G/DL (ref 33.7–35.3)
MCV RBC AUTO: 92.8 FL (ref 81.4–97.8)
MONOCYTES # BLD AUTO: 0.59 K/UL (ref 0–0.85)
MONOCYTES NFR BLD AUTO: 7 % (ref 0–13.4)
NEUTROPHILS # BLD AUTO: 4.22 K/UL (ref 1.82–7.42)
NEUTROPHILS NFR BLD: 50.4 % (ref 44–72)
NRBC # BLD AUTO: 0 K/UL
NRBC BLD-RTO: 0 /100 WBC
PLATELET # BLD AUTO: 250 K/UL (ref 164–446)
PMV BLD AUTO: 9.8 FL (ref 9–12.9)
POTASSIUM SERPL-SCNC: 3.6 MMOL/L (ref 3.6–5.5)
PROTHROMBIN TIME: 12.5 SEC (ref 12–14.6)
RBC # BLD AUTO: 4.74 M/UL (ref 4.7–6.1)
SODIUM SERPL-SCNC: 140 MMOL/L (ref 135–145)
WBC # BLD AUTO: 8.4 K/UL (ref 4.8–10.8)

## 2019-12-06 PROCEDURE — 85730 THROMBOPLASTIN TIME PARTIAL: CPT

## 2019-12-06 PROCEDURE — 99284 EMERGENCY DEPT VISIT MOD MDM: CPT

## 2019-12-06 PROCEDURE — A9270 NON-COVERED ITEM OR SERVICE: HCPCS | Performed by: EMERGENCY MEDICINE

## 2019-12-06 PROCEDURE — 85610 PROTHROMBIN TIME: CPT

## 2019-12-06 PROCEDURE — 700102 HCHG RX REV CODE 250 W/ 637 OVERRIDE(OP): Performed by: EMERGENCY MEDICINE

## 2019-12-06 PROCEDURE — 73562 X-RAY EXAM OF KNEE 3: CPT | Mod: RT

## 2019-12-06 PROCEDURE — 87040 BLOOD CULTURE FOR BACTERIA: CPT

## 2019-12-06 PROCEDURE — 93971 EXTREMITY STUDY: CPT | Mod: RT

## 2019-12-06 PROCEDURE — 85025 COMPLETE CBC W/AUTO DIFF WBC: CPT

## 2019-12-06 PROCEDURE — 80048 BASIC METABOLIC PNL TOTAL CA: CPT

## 2019-12-06 RX ORDER — CEPHALEXIN 500 MG/1
500 CAPSULE ORAL ONCE
Status: COMPLETED | OUTPATIENT
Start: 2019-12-06 | End: 2019-12-06

## 2019-12-06 RX ORDER — CEPHALEXIN 500 MG/1
500 CAPSULE ORAL 4 TIMES DAILY
Qty: 28 CAP | Refills: 0 | Status: SHIPPED | OUTPATIENT
Start: 2019-12-06 | End: 2021-03-05

## 2019-12-06 RX ORDER — HYDROCODONE BITARTRATE AND ACETAMINOPHEN 5; 325 MG/1; MG/1
1 TABLET ORAL ONCE
Status: COMPLETED | OUTPATIENT
Start: 2019-12-06 | End: 2019-12-06

## 2019-12-06 RX ADMIN — CEPHALEXIN 500 MG: 500 CAPSULE ORAL at 02:54

## 2019-12-06 RX ADMIN — HYDROCODONE BITARTRATE AND ACETAMINOPHEN 1 TABLET: 5; 325 TABLET ORAL at 02:54

## 2019-12-06 NOTE — ED NOTES
Pt verbalizes understanding of discharge and follow-up instructions.  PIV removed.  Given Rx X1.  VSS.  All questions answered.  Taken to DC in , called [facility] for transport
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
## 2019-12-11 LAB
BACTERIA BLD CULT: NORMAL
BACTERIA BLD CULT: NORMAL
SIGNIFICANT IND 70042: NORMAL
SIGNIFICANT IND 70042: NORMAL
SITE SITE: NORMAL
SITE SITE: NORMAL
SOURCE SOURCE: NORMAL
SOURCE SOURCE: NORMAL

## 2020-09-05 ENCOUNTER — HOSPITAL ENCOUNTER (EMERGENCY)
Facility: MEDICAL CENTER | Age: 70
End: 2020-09-06
Attending: EMERGENCY MEDICINE
Payer: MEDICARE

## 2020-09-05 ENCOUNTER — APPOINTMENT (OUTPATIENT)
Dept: RADIOLOGY | Facility: MEDICAL CENTER | Age: 70
End: 2020-09-05
Attending: EMERGENCY MEDICINE
Payer: MEDICARE

## 2020-09-05 DIAGNOSIS — R05.9 COUGH: ICD-10-CM

## 2020-09-05 DIAGNOSIS — M79.604 PAIN AND SWELLING OF RIGHT LOWER EXTREMITY: ICD-10-CM

## 2020-09-05 DIAGNOSIS — R51.9 ACUTE NONINTRACTABLE HEADACHE, UNSPECIFIED HEADACHE TYPE: ICD-10-CM

## 2020-09-05 DIAGNOSIS — M79.89 PAIN AND SWELLING OF RIGHT LOWER EXTREMITY: ICD-10-CM

## 2020-09-05 DIAGNOSIS — R10.9 LEFT FLANK PAIN: ICD-10-CM

## 2020-09-05 PROCEDURE — 80053 COMPREHEN METABOLIC PANEL: CPT

## 2020-09-05 PROCEDURE — 94760 N-INVAS EAR/PLS OXIMETRY 1: CPT

## 2020-09-05 PROCEDURE — 84484 ASSAY OF TROPONIN QUANT: CPT

## 2020-09-05 PROCEDURE — U0003 INFECTIOUS AGENT DETECTION BY NUCLEIC ACID (DNA OR RNA); SEVERE ACUTE RESPIRATORY SYNDROME CORONAVIRUS 2 (SARS-COV-2) (CORONAVIRUS DISEASE [COVID-19]), AMPLIFIED PROBE TECHNIQUE, MAKING USE OF HIGH THROUGHPUT TECHNOLOGIES AS DESCRIBED BY CMS-2020-01-R: HCPCS

## 2020-09-05 PROCEDURE — 71045 X-RAY EXAM CHEST 1 VIEW: CPT

## 2020-09-05 PROCEDURE — 99285 EMERGENCY DEPT VISIT HI MDM: CPT

## 2020-09-05 PROCEDURE — 85025 COMPLETE CBC W/AUTO DIFF WBC: CPT

## 2020-09-05 PROCEDURE — 83690 ASSAY OF LIPASE: CPT

## 2020-09-05 PROCEDURE — 93005 ELECTROCARDIOGRAM TRACING: CPT | Performed by: EMERGENCY MEDICINE

## 2020-09-05 PROCEDURE — 700111 HCHG RX REV CODE 636 W/ 250 OVERRIDE (IP): Performed by: EMERGENCY MEDICINE

## 2020-09-05 PROCEDURE — 83605 ASSAY OF LACTIC ACID: CPT

## 2020-09-05 PROCEDURE — 84145 PROCALCITONIN (PCT): CPT

## 2020-09-05 PROCEDURE — 96374 THER/PROPH/DIAG INJ IV PUSH: CPT

## 2020-09-05 PROCEDURE — C9803 HOPD COVID-19 SPEC COLLECT: HCPCS | Performed by: EMERGENCY MEDICINE

## 2020-09-05 PROCEDURE — 87040 BLOOD CULTURE FOR BACTERIA: CPT

## 2020-09-05 RX ORDER — ONDANSETRON 2 MG/ML
4 INJECTION INTRAMUSCULAR; INTRAVENOUS ONCE
Status: COMPLETED | OUTPATIENT
Start: 2020-09-05 | End: 2020-09-05

## 2020-09-05 RX ORDER — MORPHINE SULFATE 4 MG/ML
4 INJECTION, SOLUTION INTRAMUSCULAR; INTRAVENOUS ONCE
Status: COMPLETED | OUTPATIENT
Start: 2020-09-05 | End: 2020-09-06

## 2020-09-05 RX ADMIN — ONDANSETRON 4 MG: 2 INJECTION INTRAMUSCULAR; INTRAVENOUS at 23:54

## 2020-09-06 ENCOUNTER — APPOINTMENT (OUTPATIENT)
Dept: RADIOLOGY | Facility: MEDICAL CENTER | Age: 70
End: 2020-09-06
Attending: EMERGENCY MEDICINE
Payer: MEDICARE

## 2020-09-06 VITALS
DIASTOLIC BLOOD PRESSURE: 65 MMHG | WEIGHT: 251.1 LBS | SYSTOLIC BLOOD PRESSURE: 122 MMHG | HEIGHT: 66 IN | OXYGEN SATURATION: 95 % | HEART RATE: 57 BPM | BODY MASS INDEX: 40.36 KG/M2 | TEMPERATURE: 98.1 F | RESPIRATION RATE: 14 BRPM

## 2020-09-06 LAB
ALBUMIN SERPL BCP-MCNC: 4.3 G/DL (ref 3.2–4.9)
ALBUMIN/GLOB SERPL: 1.3 G/DL
ALP SERPL-CCNC: 93 U/L (ref 30–99)
ALT SERPL-CCNC: 22 U/L (ref 2–50)
ANION GAP SERPL CALC-SCNC: 14 MMOL/L (ref 7–16)
APPEARANCE UR: CLEAR
AST SERPL-CCNC: 21 U/L (ref 12–45)
BASOPHILS # BLD AUTO: 1.6 % (ref 0–1.8)
BASOPHILS # BLD: 0.16 K/UL (ref 0–0.12)
BILIRUB SERPL-MCNC: 0.3 MG/DL (ref 0.1–1.5)
BILIRUB UR QL STRIP.AUTO: NEGATIVE
BUN SERPL-MCNC: 22 MG/DL (ref 8–22)
CALCIUM SERPL-MCNC: 9.5 MG/DL (ref 8.5–10.5)
CHLORIDE SERPL-SCNC: 106 MMOL/L (ref 96–112)
CO2 SERPL-SCNC: 19 MMOL/L (ref 20–33)
COLOR UR: ABNORMAL
COVID ORDER STATUS COVID19: NORMAL
CREAT SERPL-MCNC: 1.17 MG/DL (ref 0.5–1.4)
EKG IMPRESSION: NORMAL
EOSINOPHIL # BLD AUTO: 0.89 K/UL (ref 0–0.51)
EOSINOPHIL NFR BLD: 9 % (ref 0–6.9)
ERYTHROCYTE [DISTWIDTH] IN BLOOD BY AUTOMATED COUNT: 46.2 FL (ref 35.9–50)
GLOBULIN SER CALC-MCNC: 3.2 G/DL (ref 1.9–3.5)
GLUCOSE SERPL-MCNC: 96 MG/DL (ref 65–99)
GLUCOSE UR STRIP.AUTO-MCNC: NEGATIVE MG/DL
HCT VFR BLD AUTO: 47.1 % (ref 42–52)
HGB BLD-MCNC: 15.5 G/DL (ref 14–18)
IMM GRANULOCYTES # BLD AUTO: 0.04 K/UL (ref 0–0.11)
IMM GRANULOCYTES NFR BLD AUTO: 0.4 % (ref 0–0.9)
KETONES UR STRIP.AUTO-MCNC: ABNORMAL MG/DL
LACTATE BLD-SCNC: 1.5 MMOL/L (ref 0.5–2)
LEUKOCYTE ESTERASE UR QL STRIP.AUTO: NEGATIVE
LIPASE SERPL-CCNC: 32 U/L (ref 11–82)
LYMPHOCYTES # BLD AUTO: 2.9 K/UL (ref 1–4.8)
LYMPHOCYTES NFR BLD: 29.3 % (ref 22–41)
MCH RBC QN AUTO: 30.3 PG (ref 27–33)
MCHC RBC AUTO-ENTMCNC: 32.9 G/DL (ref 33.7–35.3)
MCV RBC AUTO: 92 FL (ref 81.4–97.8)
MICRO URNS: ABNORMAL
MONOCYTES # BLD AUTO: 0.76 K/UL (ref 0–0.85)
MONOCYTES NFR BLD AUTO: 7.7 % (ref 0–13.4)
NEUTROPHILS # BLD AUTO: 5.14 K/UL (ref 1.82–7.42)
NEUTROPHILS NFR BLD: 52 % (ref 44–72)
NITRITE UR QL STRIP.AUTO: NEGATIVE
NRBC # BLD AUTO: 0 K/UL
NRBC BLD-RTO: 0 /100 WBC
PH UR STRIP.AUTO: 5.5 [PH] (ref 5–8)
PLATELET # BLD AUTO: 242 K/UL (ref 164–446)
PMV BLD AUTO: 9.8 FL (ref 9–12.9)
POTASSIUM SERPL-SCNC: 4.6 MMOL/L (ref 3.6–5.5)
PROCALCITONIN SERPL-MCNC: <0.05 NG/ML
PROT SERPL-MCNC: 7.5 G/DL (ref 6–8.2)
PROT UR QL STRIP: NEGATIVE MG/DL
RBC # BLD AUTO: 5.12 M/UL (ref 4.7–6.1)
RBC UR QL AUTO: NEGATIVE
SARS-COV-2 RNA RESP QL NAA+PROBE: NOTDETECTED
SODIUM SERPL-SCNC: 139 MMOL/L (ref 135–145)
SP GR UR STRIP.AUTO: 1.03
SPECIMEN SOURCE: NORMAL
TROPONIN T SERPL-MCNC: 10 NG/L (ref 6–19)
UROBILINOGEN UR STRIP.AUTO-MCNC: 1 MG/DL
WBC # BLD AUTO: 9.9 K/UL (ref 4.8–10.8)

## 2020-09-06 PROCEDURE — 74177 CT ABD & PELVIS W/CONTRAST: CPT

## 2020-09-06 PROCEDURE — 700111 HCHG RX REV CODE 636 W/ 250 OVERRIDE (IP): Performed by: EMERGENCY MEDICINE

## 2020-09-06 PROCEDURE — 96375 TX/PRO/DX INJ NEW DRUG ADDON: CPT

## 2020-09-06 PROCEDURE — 700117 HCHG RX CONTRAST REV CODE 255: Performed by: EMERGENCY MEDICINE

## 2020-09-06 PROCEDURE — 81003 URINALYSIS AUTO W/O SCOPE: CPT

## 2020-09-06 PROCEDURE — 93971 EXTREMITY STUDY: CPT | Mod: RT

## 2020-09-06 RX ADMIN — MORPHINE SULFATE 4 MG: 4 INJECTION INTRAVENOUS at 00:10

## 2020-09-06 RX ADMIN — IOHEXOL 100 ML: 350 INJECTION, SOLUTION INTRAVENOUS at 02:11

## 2020-09-06 NOTE — ED TRIAGE NOTES
Chief Complaint   Patient presents with   • Headache     frontal headache x 1 day, + visual disturbances, denies n/v. PERR. Hx of migraines. took 2 aleve this am with some relief.    • Cough     productive cough today, + SOB. Denies chest pain or discomfort.    • Flank Pain     left flank pain x 1 week, + dysuria     Pt BIB EMS from [place] for above complaints. Pt also c/o RLE pain x 2 weeks, mild swelling, brown discoloration noted. Educated on triage process, encouraged to inform staff of any changes.

## 2020-09-06 NOTE — ED NOTES
Pt aox4, skin pink warm and dry, airway patent, rr even and unlabored, nad noted. No new complaints. Pt assisted by wheelchair to lobby.

## 2020-09-11 LAB
BACTERIA BLD CULT: NORMAL
SIGNIFICANT IND 70042: NORMAL
SITE SITE: NORMAL
SOURCE SOURCE: NORMAL

## 2020-09-12 NOTE — ED NOTES
COVID-19 Test Follow Up  09/12/20      Patient tested negative for COVID-19. I have informed the patient of the result by telephone. Encouraged to stay at home until no fever for 72 hours without the use of fever reducing medications and symptoms improving. Informed there is no need to further self-isolate for 14 days for COVID-19 unless otherwise directed by the Health Dept.     Patient reports no respiratory symptoms, fever, chills, nausea or vomiting.    Jabari Hernandez, PharmD

## 2021-03-03 DIAGNOSIS — Z23 NEED FOR VACCINATION: ICD-10-CM

## 2021-03-05 ENCOUNTER — HOSPITAL ENCOUNTER (EMERGENCY)
Facility: MEDICAL CENTER | Age: 71
End: 2021-03-05
Attending: EMERGENCY MEDICINE
Payer: MEDICARE

## 2021-03-05 VITALS
RESPIRATION RATE: 20 BRPM | SYSTOLIC BLOOD PRESSURE: 111 MMHG | HEIGHT: 66 IN | HEART RATE: 68 BPM | DIASTOLIC BLOOD PRESSURE: 56 MMHG | BODY MASS INDEX: 42.59 KG/M2 | WEIGHT: 265 LBS | TEMPERATURE: 99.8 F | OXYGEN SATURATION: 93 %

## 2021-03-05 DIAGNOSIS — L02.31 GLUTEAL ABSCESS: ICD-10-CM

## 2021-03-05 LAB
ALBUMIN SERPL BCP-MCNC: 3.4 G/DL (ref 3.2–4.9)
ALBUMIN/GLOB SERPL: 1 G/DL
ALP SERPL-CCNC: 106 U/L (ref 30–99)
ALT SERPL-CCNC: 18 U/L (ref 2–50)
ANION GAP SERPL CALC-SCNC: 10 MMOL/L (ref 7–16)
AST SERPL-CCNC: 27 U/L (ref 12–45)
BASOPHILS # BLD AUTO: 1 % (ref 0–1.8)
BASOPHILS # BLD: 0.1 K/UL (ref 0–0.12)
BILIRUB SERPL-MCNC: 0.2 MG/DL (ref 0.1–1.5)
BUN SERPL-MCNC: 13 MG/DL (ref 8–22)
CALCIUM SERPL-MCNC: 8.8 MG/DL (ref 8.5–10.5)
CHLORIDE SERPL-SCNC: 106 MMOL/L (ref 96–112)
CO2 SERPL-SCNC: 23 MMOL/L (ref 20–33)
CREAT SERPL-MCNC: 0.86 MG/DL (ref 0.5–1.4)
EOSINOPHIL # BLD AUTO: 0.58 K/UL (ref 0–0.51)
EOSINOPHIL NFR BLD: 5.6 % (ref 0–6.9)
ERYTHROCYTE [DISTWIDTH] IN BLOOD BY AUTOMATED COUNT: 45.5 FL (ref 35.9–50)
GLOBULIN SER CALC-MCNC: 3.5 G/DL (ref 1.9–3.5)
GLUCOSE SERPL-MCNC: 105 MG/DL (ref 65–99)
HCT VFR BLD AUTO: 44.1 % (ref 42–52)
HGB BLD-MCNC: 14.2 G/DL (ref 14–18)
IMM GRANULOCYTES # BLD AUTO: 0.05 K/UL (ref 0–0.11)
IMM GRANULOCYTES NFR BLD AUTO: 0.5 % (ref 0–0.9)
LACTATE BLD-SCNC: 1.6 MMOL/L (ref 0.5–2)
LYMPHOCYTES # BLD AUTO: 2.22 K/UL (ref 1–4.8)
LYMPHOCYTES NFR BLD: 21.4 % (ref 22–41)
MCH RBC QN AUTO: 30 PG (ref 27–33)
MCHC RBC AUTO-ENTMCNC: 32.2 G/DL (ref 33.7–35.3)
MCV RBC AUTO: 93 FL (ref 81.4–97.8)
MONOCYTES # BLD AUTO: 1.33 K/UL (ref 0–0.85)
MONOCYTES NFR BLD AUTO: 12.8 % (ref 0–13.4)
NEUTROPHILS # BLD AUTO: 6.1 K/UL (ref 1.82–7.42)
NEUTROPHILS NFR BLD: 58.7 % (ref 44–72)
NRBC # BLD AUTO: 0 K/UL
NRBC BLD-RTO: 0 /100 WBC
PLATELET # BLD AUTO: 262 K/UL (ref 164–446)
PMV BLD AUTO: 9.8 FL (ref 9–12.9)
POTASSIUM SERPL-SCNC: 4.3 MMOL/L (ref 3.6–5.5)
PROT SERPL-MCNC: 6.9 G/DL (ref 6–8.2)
RBC # BLD AUTO: 4.74 M/UL (ref 4.7–6.1)
SODIUM SERPL-SCNC: 139 MMOL/L (ref 135–145)
WBC # BLD AUTO: 10.4 K/UL (ref 4.8–10.8)

## 2021-03-05 PROCEDURE — 87150 DNA/RNA AMPLIFIED PROBE: CPT

## 2021-03-05 PROCEDURE — 99284 EMERGENCY DEPT VISIT MOD MDM: CPT

## 2021-03-05 PROCEDURE — 700102 HCHG RX REV CODE 250 W/ 637 OVERRIDE(OP): Performed by: EMERGENCY MEDICINE

## 2021-03-05 PROCEDURE — 83605 ASSAY OF LACTIC ACID: CPT

## 2021-03-05 PROCEDURE — 700101 HCHG RX REV CODE 250: Performed by: EMERGENCY MEDICINE

## 2021-03-05 PROCEDURE — 87186 SC STD MICRODIL/AGAR DIL: CPT | Mod: 91

## 2021-03-05 PROCEDURE — 303977 HCHG I & D

## 2021-03-05 PROCEDURE — 80053 COMPREHEN METABOLIC PANEL: CPT

## 2021-03-05 PROCEDURE — 85025 COMPLETE CBC W/AUTO DIFF WBC: CPT

## 2021-03-05 PROCEDURE — 87077 CULTURE AEROBIC IDENTIFY: CPT | Mod: 91

## 2021-03-05 PROCEDURE — 36415 COLL VENOUS BLD VENIPUNCTURE: CPT

## 2021-03-05 PROCEDURE — A9270 NON-COVERED ITEM OR SERVICE: HCPCS | Performed by: EMERGENCY MEDICINE

## 2021-03-05 PROCEDURE — 87040 BLOOD CULTURE FOR BACTERIA: CPT | Mod: 91

## 2021-03-05 RX ORDER — SULFAMETHOXAZOLE AND TRIMETHOPRIM 800; 160 MG/1; MG/1
1 TABLET ORAL 2 TIMES DAILY
Qty: 10 TABLET | Refills: 0 | Status: SHIPPED | OUTPATIENT
Start: 2021-03-05 | End: 2021-03-10

## 2021-03-05 RX ORDER — SULFAMETHOXAZOLE AND TRIMETHOPRIM 800; 160 MG/1; MG/1
1 TABLET ORAL ONCE
Status: COMPLETED | OUTPATIENT
Start: 2021-03-05 | End: 2021-03-05

## 2021-03-05 RX ORDER — HYDROCODONE BITARTRATE AND ACETAMINOPHEN 5; 325 MG/1; MG/1
1 TABLET ORAL ONCE
Status: COMPLETED | OUTPATIENT
Start: 2021-03-05 | End: 2021-03-05

## 2021-03-05 RX ORDER — BUPIVACAINE HYDROCHLORIDE AND EPINEPHRINE 5; 5 MG/ML; UG/ML
10 INJECTION, SOLUTION EPIDURAL; INTRACAUDAL; PERINEURAL ONCE
Status: COMPLETED | OUTPATIENT
Start: 2021-03-05 | End: 2021-03-05

## 2021-03-05 RX ADMIN — SULFAMETHOXAZOLE AND TRIMETHOPRIM 1 TABLET: 800; 160 TABLET ORAL at 02:55

## 2021-03-05 RX ADMIN — HYDROCODONE BITARTRATE AND ACETAMINOPHEN 1 TABLET: 5; 325 TABLET ORAL at 02:55

## 2021-03-05 RX ADMIN — BUPIVACAINE HYDROCHLORIDE AND EPINEPHRINE 10 ML: 5; 5 INJECTION, SOLUTION EPIDURAL; INTRACAUDAL; PERINEURAL at 03:07

## 2021-03-05 ASSESSMENT — PAIN DESCRIPTION - PAIN TYPE: TYPE: ACUTE PAIN

## 2021-03-05 ASSESSMENT — FIBROSIS 4 INDEX: FIB4 SCORE: 1.26

## 2021-03-05 NOTE — DISCHARGE INSTRUCTIONS
You were seen in the emergency department for an abscess. This was drained in the emergency department. Please keep the area clean. Please wash several times a day with soap and water. Gently massage out any pus that forms. Please do not use any harsh chemicals, such as alcohol or hydrogen peroxide.     You are being sent home on a course of antibiotics. Please take these as prescribed. Please followup with your regular doctor.    A loop has been left to ensure drainage of your abscess.  This may be removed next week at your clinic visit.    Call your doctor or return to the ER:   -If you develop fevers, chills, worsening pain, spreading redness or other concerning symptoms
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
## 2021-03-05 NOTE — ED TRIAGE NOTES
"Chief Complaint   Patient presents with   • Cyst     pt states the last three days, a cyst has been growing on his R buttock, unable to sit or lay on R side. 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    pt states he \"feels warm,\" but no chills.     /65   Pulse 78   Temp 37.9 °C (100.3 °F) (Temporal)   Resp 20   Ht 1.676 m (5' 6\")   Wt 120 kg (265 lb)   SpO2 96%   BMI 42.77 kg/m²     "

## 2021-03-05 NOTE — ED NOTES
Discharge education provided. Patient verbalizes understanding. Patient A/Ox4 and ambulatory to lobby. Patient given information to call [facility] for a ride. Prescription with patient.

## 2021-03-06 ENCOUNTER — APPOINTMENT (OUTPATIENT)
Dept: RADIOLOGY | Facility: MEDICAL CENTER | Age: 71
DRG: 603 | End: 2021-03-06
Attending: EMERGENCY MEDICINE
Payer: MEDICARE

## 2021-03-06 ENCOUNTER — HOSPITAL ENCOUNTER (INPATIENT)
Facility: MEDICAL CENTER | Age: 71
LOS: 4 days | DRG: 603 | End: 2021-03-10
Attending: EMERGENCY MEDICINE | Admitting: INTERNAL MEDICINE
Payer: MEDICARE

## 2021-03-06 DIAGNOSIS — L02.31 ABSCESS OF RIGHT BUTTOCK: ICD-10-CM

## 2021-03-06 DIAGNOSIS — R78.81 POSITIVE BLOOD CULTURES: ICD-10-CM

## 2021-03-06 DIAGNOSIS — R78.81 BACTEREMIA: ICD-10-CM

## 2021-03-06 DIAGNOSIS — R09.02 HYPOXIA: ICD-10-CM

## 2021-03-06 LAB
ANION GAP SERPL CALC-SCNC: 12 MMOL/L (ref 7–16)
BASOPHILS # BLD AUTO: 1.5 % (ref 0–1.8)
BASOPHILS # BLD: 0.12 K/UL (ref 0–0.12)
BUN SERPL-MCNC: 10 MG/DL (ref 8–22)
CALCIUM SERPL-MCNC: 9.3 MG/DL (ref 8.5–10.5)
CHLORIDE SERPL-SCNC: 101 MMOL/L (ref 96–112)
CO2 SERPL-SCNC: 21 MMOL/L (ref 20–33)
CREAT SERPL-MCNC: 1.09 MG/DL (ref 0.5–1.4)
EOSINOPHIL # BLD AUTO: 0.59 K/UL (ref 0–0.51)
EOSINOPHIL NFR BLD: 7.4 % (ref 0–6.9)
ERYTHROCYTE [DISTWIDTH] IN BLOOD BY AUTOMATED COUNT: 43.8 FL (ref 35.9–50)
GLUCOSE SERPL-MCNC: 92 MG/DL (ref 65–99)
HCT VFR BLD AUTO: 43.8 % (ref 42–52)
HGB BLD-MCNC: 14.7 G/DL (ref 14–18)
IMM GRANULOCYTES # BLD AUTO: 0.02 K/UL (ref 0–0.11)
IMM GRANULOCYTES NFR BLD AUTO: 0.2 % (ref 0–0.9)
LYMPHOCYTES # BLD AUTO: 2.19 K/UL (ref 1–4.8)
LYMPHOCYTES NFR BLD: 27.3 % (ref 22–41)
MCH RBC QN AUTO: 30.4 PG (ref 27–33)
MCHC RBC AUTO-ENTMCNC: 33.6 G/DL (ref 33.7–35.3)
MCV RBC AUTO: 90.5 FL (ref 81.4–97.8)
MONOCYTES # BLD AUTO: 0.74 K/UL (ref 0–0.85)
MONOCYTES NFR BLD AUTO: 9.2 % (ref 0–13.4)
NEUTROPHILS # BLD AUTO: 4.36 K/UL (ref 1.82–7.42)
NEUTROPHILS NFR BLD: 54.4 % (ref 44–72)
NRBC # BLD AUTO: 0 K/UL
NRBC BLD-RTO: 0 /100 WBC
PLATELET # BLD AUTO: 278 K/UL (ref 164–446)
PMV BLD AUTO: 9.7 FL (ref 9–12.9)
POTASSIUM SERPL-SCNC: 4.2 MMOL/L (ref 3.6–5.5)
RBC # BLD AUTO: 4.84 M/UL (ref 4.7–6.1)
SARS-COV-2 RNA RESP QL NAA+PROBE: NOTDETECTED
SODIUM SERPL-SCNC: 134 MMOL/L (ref 135–145)
SPECIMEN SOURCE: NORMAL
WBC # BLD AUTO: 8 K/UL (ref 4.8–10.8)

## 2021-03-06 PROCEDURE — 87040 BLOOD CULTURE FOR BACTERIA: CPT | Mod: 91

## 2021-03-06 PROCEDURE — 71275 CT ANGIOGRAPHY CHEST: CPT | Mod: ME

## 2021-03-06 PROCEDURE — 36415 COLL VENOUS BLD VENIPUNCTURE: CPT

## 2021-03-06 PROCEDURE — 700105 HCHG RX REV CODE 258: Performed by: EMERGENCY MEDICINE

## 2021-03-06 PROCEDURE — 700111 HCHG RX REV CODE 636 W/ 250 OVERRIDE (IP): Performed by: EMERGENCY MEDICINE

## 2021-03-06 PROCEDURE — 96365 THER/PROPH/DIAG IV INF INIT: CPT

## 2021-03-06 PROCEDURE — U0003 INFECTIOUS AGENT DETECTION BY NUCLEIC ACID (DNA OR RNA); SEVERE ACUTE RESPIRATORY SYNDROME CORONAVIRUS 2 (SARS-COV-2) (CORONAVIRUS DISEASE [COVID-19]), AMPLIFIED PROBE TECHNIQUE, MAKING USE OF HIGH THROUGHPUT TECHNOLOGIES AS DESCRIBED BY CMS-2020-01-R: HCPCS

## 2021-03-06 PROCEDURE — 700102 HCHG RX REV CODE 250 W/ 637 OVERRIDE(OP): Performed by: EMERGENCY MEDICINE

## 2021-03-06 PROCEDURE — A9270 NON-COVERED ITEM OR SERVICE: HCPCS | Performed by: EMERGENCY MEDICINE

## 2021-03-06 PROCEDURE — 770006 HCHG ROOM/CARE - MED/SURG/GYN SEMI*

## 2021-03-06 PROCEDURE — 700117 HCHG RX CONTRAST REV CODE 255: Performed by: EMERGENCY MEDICINE

## 2021-03-06 PROCEDURE — A9270 NON-COVERED ITEM OR SERVICE: HCPCS | Performed by: INTERNAL MEDICINE

## 2021-03-06 PROCEDURE — 85025 COMPLETE CBC W/AUTO DIFF WBC: CPT

## 2021-03-06 PROCEDURE — 71045 X-RAY EXAM CHEST 1 VIEW: CPT

## 2021-03-06 PROCEDURE — 80048 BASIC METABOLIC PNL TOTAL CA: CPT

## 2021-03-06 PROCEDURE — U0005 INFEC AGEN DETEC AMPLI PROBE: HCPCS

## 2021-03-06 PROCEDURE — 99285 EMERGENCY DEPT VISIT HI MDM: CPT

## 2021-03-06 PROCEDURE — 99223 1ST HOSP IP/OBS HIGH 75: CPT | Performed by: INTERNAL MEDICINE

## 2021-03-06 PROCEDURE — 700102 HCHG RX REV CODE 250 W/ 637 OVERRIDE(OP): Performed by: INTERNAL MEDICINE

## 2021-03-06 RX ORDER — POLYETHYLENE GLYCOL 3350 17 G/17G
1 POWDER, FOR SOLUTION ORAL
Status: DISCONTINUED | OUTPATIENT
Start: 2021-03-06 | End: 2021-03-10 | Stop reason: HOSPADM

## 2021-03-06 RX ORDER — CHOLECALCIFEROL (VITAMIN D3) 125 MCG
2000 CAPSULE ORAL EVERY MORNING
Status: DISCONTINUED | OUTPATIENT
Start: 2021-03-07 | End: 2021-03-10 | Stop reason: HOSPADM

## 2021-03-06 RX ORDER — ACETAMINOPHEN 325 MG/1
650 TABLET ORAL EVERY 6 HOURS PRN
Status: DISCONTINUED | OUTPATIENT
Start: 2021-03-06 | End: 2021-03-10 | Stop reason: HOSPADM

## 2021-03-06 RX ORDER — LOVASTATIN 20 MG/1
20 TABLET ORAL DAILY
Status: DISCONTINUED | OUTPATIENT
Start: 2021-03-07 | End: 2021-03-09

## 2021-03-06 RX ORDER — BISACODYL 10 MG
10 SUPPOSITORY, RECTAL RECTAL
Status: DISCONTINUED | OUTPATIENT
Start: 2021-03-06 | End: 2021-03-10 | Stop reason: HOSPADM

## 2021-03-06 RX ORDER — AMOXICILLIN 250 MG
2 CAPSULE ORAL 2 TIMES DAILY
Status: DISCONTINUED | OUTPATIENT
Start: 2021-03-07 | End: 2021-03-10 | Stop reason: HOSPADM

## 2021-03-06 RX ORDER — CALCIUM CARBONATE 500 MG/1
1000 TABLET, CHEWABLE ORAL 3 TIMES DAILY PRN
Status: DISCONTINUED | OUTPATIENT
Start: 2021-03-06 | End: 2021-03-10 | Stop reason: HOSPADM

## 2021-03-06 RX ORDER — LISINOPRIL 20 MG/1
40 TABLET ORAL EVERY MORNING
Status: DISCONTINUED | OUTPATIENT
Start: 2021-03-07 | End: 2021-03-10 | Stop reason: HOSPADM

## 2021-03-06 RX ORDER — HYDROCODONE BITARTRATE AND ACETAMINOPHEN 5; 325 MG/1; MG/1
1 TABLET ORAL ONCE
Status: COMPLETED | OUTPATIENT
Start: 2021-03-06 | End: 2021-03-06

## 2021-03-06 RX ADMIN — METOPROLOL TARTRATE 25 MG: 25 TABLET, FILM COATED ORAL at 21:37

## 2021-03-06 RX ADMIN — CEFTRIAXONE SODIUM 2 G: 2 INJECTION, POWDER, FOR SOLUTION INTRAMUSCULAR; INTRAVENOUS at 13:38

## 2021-03-06 RX ADMIN — HYDROCODONE BITARTRATE AND ACETAMINOPHEN 1 TABLET: 5; 325 TABLET ORAL at 13:08

## 2021-03-06 RX ADMIN — IOHEXOL 60 ML: 350 INJECTION, SOLUTION INTRAVENOUS at 19:30

## 2021-03-06 RX ADMIN — ACETAMINOPHEN 650 MG: 325 TABLET, FILM COATED ORAL at 21:37

## 2021-03-06 ASSESSMENT — COGNITIVE AND FUNCTIONAL STATUS - GENERAL
WALKING IN HOSPITAL ROOM: A LITTLE
MOVING FROM LYING ON BACK TO SITTING ON SIDE OF FLAT BED: A LITTLE
MOBILITY SCORE: 20
STANDING UP FROM CHAIR USING ARMS: A LITTLE
CLIMB 3 TO 5 STEPS WITH RAILING: A LITTLE
SUGGESTED CMS G CODE MODIFIER MOBILITY: CJ
SUGGESTED CMS G CODE MODIFIER DAILY ACTIVITY: CH
DAILY ACTIVITIY SCORE: 24

## 2021-03-06 ASSESSMENT — PATIENT HEALTH QUESTIONNAIRE - PHQ9
2. FEELING DOWN, DEPRESSED, IRRITABLE, OR HOPELESS: NOT AT ALL
1. LITTLE INTEREST OR PLEASURE IN DOING THINGS: NOT AT ALL
SUM OF ALL RESPONSES TO PHQ9 QUESTIONS 1 AND 2: 0

## 2021-03-06 ASSESSMENT — LIFESTYLE VARIABLES
ALCOHOL_USE: NO
EVER FELT BAD OR GUILTY ABOUT YOUR DRINKING: NO
TOTAL SCORE: 0
CONSUMPTION TOTAL: NEGATIVE
AVERAGE NUMBER OF DAYS PER WEEK YOU HAVE A DRINK CONTAINING ALCOHOL: 0
HOW MANY TIMES IN THE PAST YEAR HAVE YOU HAD 5 OR MORE DRINKS IN A DAY: 0
TOTAL SCORE: 0
DOES PATIENT WANT TO STOP DRINKING: NO
HAVE PEOPLE ANNOYED YOU BY CRITICIZING YOUR DRINKING: NO
HAVE YOU EVER FELT YOU SHOULD CUT DOWN ON YOUR DRINKING: NO
TOTAL SCORE: 0
ON A TYPICAL DAY WHEN YOU DRINK ALCOHOL HOW MANY DRINKS DO YOU HAVE: 0
EVER HAD A DRINK FIRST THING IN THE MORNING TO STEADY YOUR NERVES TO GET RID OF A HANGOVER: NO

## 2021-03-06 ASSESSMENT — ENCOUNTER SYMPTOMS
RESPIRATORY NEGATIVE: 1
CONSTITUTIONAL NEGATIVE: 1
MUSCULOSKELETAL NEGATIVE: 1
GASTROINTESTINAL NEGATIVE: 1
EYES NEGATIVE: 1
NEUROLOGICAL NEGATIVE: 1
PSYCHIATRIC NEGATIVE: 1
CARDIOVASCULAR NEGATIVE: 1

## 2021-03-06 ASSESSMENT — PAIN DESCRIPTION - PAIN TYPE: TYPE: ACUTE PAIN

## 2021-03-06 ASSESSMENT — FIBROSIS 4 INDEX: FIB4 SCORE: 1.65

## 2021-03-06 NOTE — ED TRIAGE NOTES
Chief Complaint   Patient presents with   • Sent by MD     sent here for positive BC from yesterday. was seen for right gluteal cyst. was told by pcp to get evaluated and blood work done.    • Cough     c/o coughin up whitish sputum yesterday. states has hx of pneumonia     Speaking in full sentences. Placed in respiratory lounge room.

## 2021-03-06 NOTE — ED NOTES
"ED Positive Culture Follow-up/Notification Note:    Date: 3/6/21     Patient seen in the ED on 3/5/2021 for acute worsening cyst to his right buttock onset ~ 3 days prior to presentation. Pt has significant hx of cysts in the past; however, this one was notably more painful. Pt underwent I&D while in the ED. Of note, pt had a temp of 100.3 °F while in the ED, but was without leukocytosis.  1. Gluteal abscess       Discharge Medication List as of 3/5/2021  4:02 AM      START taking these medications    Details   sulfamethoxazole-trimethoprim (BACTRIM DS) 800-160 MG tablet Take 1 tablet by mouth 2 times a day for 5 days., Disp-10 tablet, R-0, Print Rx Paper             Allergies: Banana     Vitals:    03/05/21 0205 03/05/21 0211 03/05/21 0301 03/05/21 0355   BP:  138/65 111/56    Pulse:  78 68    Resp:  20 20    Temp:  37.9 °C (100.3 °F)  37.7 °C (99.8 °F)   TempSrc:  Temporal  Temporal   SpO2:  96% 93%    Weight: 120 kg (265 lb)      Height: 1.676 m (5' 6\")          Final cultures:   Results     Procedure Component Value Units Date/Time    BLOOD CULTURE [[id number]] Collected: 03/05/21 0221    Order Status: Completed Specimen: Blood from Peripheral Updated: 03/06/21 0755     Significant Indicator NEG     Source BLD     Site PERIPHERAL     Culture Result No Growth  Note: Blood cultures are incubated for 5 days and  are monitored continuously.Positive blood cultures  are called to the RN and reported as soon as  they are identified.      Narrative:      Per Hospital Policy: Only change Specimen Src: to \"Line\" if  specified by physician order.  No site indicated    BLOOD CULTURE [[id number]]  (Abnormal) Collected: 03/05/21 0210    Order Status: Completed Specimen: Blood from Peripheral Updated: 03/05/21 2305     Significant Indicator POS     Source BLD     Site PERIPHERAL     Culture Result Growth detected by Bactec instrument. 03/05/2021  23:03  Gram Stain: Gram positive cocci: Possible Staphylococcus sp.  Negative for " "Staphylococcus aureus and MRSA by PCR. Correlate  ongoing need for antibiotics with clinical condition.  Further report to follow.      Narrative:      Per Hospital Policy: Only change Specimen Src: to \"Line\" if  specified by physician order.  No site indicated          Plan:   Called patient to notify of positive culture result - pt denied fever, chills, nausea, vomiting.   Pt states that his cyst seems to have improved w/ some residual mild pain, but otherwise feels well. Pt stated that he is currently taking the Bactrim w/ no issues - pharmD encouraged compliance.   Counseled pt that Dr. Chanel recommended that he come back to the ED to obtain repeat bcx as this CoNS may represent contamination. Pt expressed understanding and was in agreement.   Appropriate antibiotic therapy prescribed. Based upon repeat culture result and any worsening in his sx, recommend potentially increasing duration of Bactrim to 14 days. Will defer extension of therapy at this time until repeat cx result.    Franki Desai, PharmD    "

## 2021-03-06 NOTE — ED PROVIDER NOTES
ED Provider Note    CHIEF COMPLAINT  Chief Complaint   Patient presents with   • Sent by MD     sent here for positive BC from yesterday. was seen for right gluteal cyst. was told by pcp to get evaluated and to get blood work done.    • Cough     c/o coughing up whitish sputum yesterday. states has hx of pneumonia       HPI  Servando Durham is a 70 y.o. male who presents to the ER for positive blood culture results.    Patient also reports a cough productive of white/yellowish sputum since yesterday.  He also reports right mid back pain that is pleuritic, sharp, increased with deep inspiration and certain movements.    Patient denies hemoptysis, chills, vomiting, chest pain, leg swelling, calf pain.      ALLERGIES  Allergies   Allergen Reactions   • Banana Swelling     Pt reports when bananas are consumed pt develops an itchy and swollen mouth and eyes water.         CURRENT MEDICATIONS  Home Medications     Reviewed by Beto Brandt (Pharmacy Tech) on 03/06/21 at 1558  Med List Status: Partial   Medication Last Dose Status   aspirin EC (ECOTRIN) 81 MG TBEC 3/6/2021 Active   calcium carbonate (TUMS) 500 MG Chew Tab 2~3 days ago Active   Cyanocobalamin (VITAMIN B-12 PO) 3/6/2021 Active   ibuprofen (MOTRIN) 200 MG Tab 2~3 days ago Active   lisinopril (PRINIVIL, ZESTRIL) 40 MG tablet 3/6/2021 Active   lovastatin (MEVACOR) 20 MG Tab  Active   metoprolol (LOPRESSOR) 25 MG TABS 3/6/2021 Active   simvastatin (ZOCOR) 10 MG Tab  Active   sulfamethoxazole-trimethoprim (BACTRIM DS) 800-160 MG tablet 3/6/2021 Active   zolpidem (AMBIEN) 5 MG Tab NOT TAKING Active                PAST MEDICAL HISTORY   has a past medical history of Arthritis, Arthritis, Aspiration pneumonia (HCC) (12/27/2011), Backpain, Congestive heart failure (HCC), Daytime sleepiness, Diabetes, Diabetes (HCC), ETOH abuse, ETOHism (HCC), Fall, Glaucoma, Glaucoma, Hypertension, Indigestion, Seizure (HCC), Sleep apnea, and Wears glasses.    SURGICAL HISTORY    "has a past surgical history that includes amputate thigh,thru femur; other; other orthopedic surgery; other (1970); and amputation low leg thru tib/fib.    SOCIAL HISTORY  Social History     Tobacco Use   • Smoking status: Former Smoker     Packs/day: 0.25     Years: 35.00     Pack years: 8.75     Quit date: 4/18/2017     Years since quitting: 3.8   • Smokeless tobacco: Never Used   Substance and Sexual Activity   • Alcohol use: No     Comment: not using since a year per pt    • Drug use: No     Comment:    • Sexual activity: Not on file       REVIEW OF SYSTEMS  See HPI for further details.  All other systems are negative except as above in HPI.    PHYSICAL EXAM  VITAL SIGNS: /62   Pulse 66   Temp 36.4 °C (97.6 °F)   Resp 16   Ht 1.702 m (5' 7\")   Wt 113 kg (249 lb 9 oz)   SpO2 95%   BMI 39.09 kg/m²     General:  WD male with elevated BMI, nontoxic appearing in NAD; A+Ox3; V/S as above; afebrile; elevated blood pressure  Skin: warm and dry; good color; no rash  HEENT: NCAT; EOMs intact; PERRL; no scleral icterus   Neck: FROM; no meningismus, no LAD  Cardiovascular: Regular heart rate and rhythm.  No murmurs, rubs, or gallops; pulses 2+ bilaterally radially and DP area  Lungs: Clear to auscultation with good air movement bilaterally.  No wheezes, rhonchi, or rales.   Abdomen: BS present; soft; NTND; no rebound, guarding, or rigidity.  No organomegaly or pulsatile mass; no CVAT   Back/buttock: Right buttock with saturated dressing in place which is easily removed; there is a blue tie present with in a resolving abscess that is minimally indurated without erythema or crepitus; nontender; spine is nontender  Extremities: Left lower extremity amputation; BLUE x 4; no e/o trauma; no pedal edema; neg Celeste's  Neurologic: CNs III-XII grossly intact; speech clear; distal sensation intact; strength 5/5 UE/LE  Psychiatric: Appropriate affect, normal mood    LABS  Results for orders placed or performed during the " hospital encounter of 03/06/21   CBC WITH DIFFERENTIAL   Result Value Ref Range    WBC 8.0 4.8 - 10.8 K/uL    RBC 4.84 4.70 - 6.10 M/uL    Hemoglobin 14.7 14.0 - 18.0 g/dL    Hematocrit 43.8 42.0 - 52.0 %    MCV 90.5 81.4 - 97.8 fL    MCH 30.4 27.0 - 33.0 pg    MCHC 33.6 (L) 33.7 - 35.3 g/dL    RDW 43.8 35.9 - 50.0 fL    Platelet Count 278 164 - 446 K/uL    MPV 9.7 9.0 - 12.9 fL    Neutrophils-Polys 54.40 44.00 - 72.00 %    Lymphocytes 27.30 22.00 - 41.00 %    Monocytes 9.20 0.00 - 13.40 %    Eosinophils 7.40 (H) 0.00 - 6.90 %    Basophils 1.50 0.00 - 1.80 %    Immature Granulocytes 0.20 0.00 - 0.90 %    Nucleated RBC 0.00 /100 WBC    Neutrophils (Absolute) 4.36 1.82 - 7.42 K/uL    Lymphs (Absolute) 2.19 1.00 - 4.80 K/uL    Monos (Absolute) 0.74 0.00 - 0.85 K/uL    Eos (Absolute) 0.59 (H) 0.00 - 0.51 K/uL    Baso (Absolute) 0.12 0.00 - 0.12 K/uL    Immature Granulocytes (abs) 0.02 0.00 - 0.11 K/uL    NRBC (Absolute) 0.00 K/uL   Basic Metabolic Panel   Result Value Ref Range    Sodium 134 (L) 135 - 145 mmol/L    Potassium 4.2 3.6 - 5.5 mmol/L    Chloride 101 96 - 112 mmol/L    Co2 21 20 - 33 mmol/L    Glucose 92 65 - 99 mg/dL    Bun 10 8 - 22 mg/dL    Creatinine 1.09 0.50 - 1.40 mg/dL    Calcium 9.3 8.5 - 10.5 mg/dL    Anion Gap 12.0 7.0 - 16.0   ESTIMATED GFR   Result Value Ref Range    GFR If African American >60 >60 mL/min/1.73 m 2    GFR If Non African American >60 >60 mL/min/1.73 m 2   SARS-CoV-2 PCR (24 hour In-House): Collect NP swab in [facility]    Specimen: Respirate   Result Value Ref Range    SARS-CoV-2 Source NP Swab          IMAGING  CT-CTA CHEST PULMONARY ARTERY W/ RECONS   Final Result      No evidence of large central or lobar branch emboli. Segmental branch emboli difficult to exclude due to patient body habitus and poor contrast opacification of the pulmonary arteries.      Cardiomegaly with coronary artery calcifications.      Peripheral interstitial prominence lung bases suggesting interstitial lung  disease            DX-CHEST-PORTABLE (1 VIEW)   Final Result      Stable chest without acute/new abnormality.        MEDICAL RECORD  I have reviewed patient's medical record and pertinent results are listed below.    Growth detected by Bactec instrument. 03/05/2021  23:03   Gram Stain: Gram positive cocci: Possible Staphylococcus sp.   Negative for Staphylococcus aureus and MRSA by PCR. Correlate   ongoing need for antibiotics with clinical condition.   Further report to follow.       COURSE & MEDICAL DECISION MAKING  I have reviewed any medical record information, laboratory studies and radiographic results as noted.    Servando Durham is a 70 y.o. male who presents for evaluation after being called by the pharmacist regarding positive blood culture results.  Patient was seen here yesterday for I&D of a right buttock abscess.  He was afebrile, had a normal white blood cell count and a normal lactic acid at that time.  He was discharged home on Bactrim.    Appropriate PPE was worn at all times while interacting with the patient, including goggles, N95 mask, and surgical mask.    Blood culture results were reviewed.  Rocephin ordered for non-MRSA staph.    I consulted the clinical pharmacist, Ranjith, who has reviewed the patient's chart and recommends Augmentin plus or minus doxycycline for outpatient treatment.    Patient is afebrile with a normal white blood cell count.  However, I would prefer to keep the patient given positive blood cultures and unknown source/sensitivities at this time.    Patient noted to be hypoxic per RN staff.  Patient currently on 2 L of O2 via nasal cannula.  Chest x-ray demonstrates no reason for his hypoxia.  Patient relates to me that he has had oxygen in the past but does not currently have oxygen therapy at home.  CT PE study ordered.  Covid testing ordered.    I have contacted the radiology department.  Apparently the IV infiltrated and the patient needed to go back to CT imaging for  completion of his PE study.    I have paged the hospitalist to advise of the need for admission.  Dr. Henry, the oncoming ERP, will follow up on the CT results.    7:11 PM  No obvious PE.  I discussed the case with Dr. Araiza from the hospitalist service who agrees to admit the patient.    FINAL IMPRESSION  1. Abscess of right buttock     2. Positive blood cultures     3. Hypoxia           Electronically signed by: Sena Valente M.D., 3/6/2021 11:47 AM

## 2021-03-07 ENCOUNTER — APPOINTMENT (OUTPATIENT)
Dept: CARDIOLOGY | Facility: MEDICAL CENTER | Age: 71
DRG: 603 | End: 2021-03-07
Attending: INTERNAL MEDICINE
Payer: MEDICARE

## 2021-03-07 LAB
ANION GAP SERPL CALC-SCNC: 9 MMOL/L (ref 7–16)
BASOPHILS # BLD AUTO: 1.3 % (ref 0–1.8)
BASOPHILS # BLD: 0.1 K/UL (ref 0–0.12)
BUN SERPL-MCNC: 14 MG/DL (ref 8–22)
CALCIUM SERPL-MCNC: 9.1 MG/DL (ref 8.5–10.5)
CHLORIDE SERPL-SCNC: 100 MMOL/L (ref 96–112)
CO2 SERPL-SCNC: 22 MMOL/L (ref 20–33)
CREAT SERPL-MCNC: 1.12 MG/DL (ref 0.5–1.4)
EOSINOPHIL # BLD AUTO: 0.75 K/UL (ref 0–0.51)
EOSINOPHIL NFR BLD: 10.1 % (ref 0–6.9)
ERYTHROCYTE [DISTWIDTH] IN BLOOD BY AUTOMATED COUNT: 44 FL (ref 35.9–50)
GLUCOSE SERPL-MCNC: 103 MG/DL (ref 65–99)
HCT VFR BLD AUTO: 43 % (ref 42–52)
HGB BLD-MCNC: 14.1 G/DL (ref 14–18)
IMM GRANULOCYTES # BLD AUTO: 0.02 K/UL (ref 0–0.11)
IMM GRANULOCYTES NFR BLD AUTO: 0.3 % (ref 0–0.9)
LV EJECT FRACT  99904: 60
LV EJECT FRACT MOD 2C 99903: 68.61
LV EJECT FRACT MOD 4C 99902: 56.34
LV EJECT FRACT MOD BP 99901: 61.26
LYMPHOCYTES # BLD AUTO: 2.22 K/UL (ref 1–4.8)
LYMPHOCYTES NFR BLD: 29.8 % (ref 22–41)
MCH RBC QN AUTO: 29.9 PG (ref 27–33)
MCHC RBC AUTO-ENTMCNC: 32.8 G/DL (ref 33.7–35.3)
MCV RBC AUTO: 91.3 FL (ref 81.4–97.8)
MONOCYTES # BLD AUTO: 0.92 K/UL (ref 0–0.85)
MONOCYTES NFR BLD AUTO: 12.3 % (ref 0–13.4)
NEUTROPHILS # BLD AUTO: 3.44 K/UL (ref 1.82–7.42)
NEUTROPHILS NFR BLD: 46.2 % (ref 44–72)
NRBC # BLD AUTO: 0 K/UL
NRBC BLD-RTO: 0 /100 WBC
PLATELET # BLD AUTO: 258 K/UL (ref 164–446)
PMV BLD AUTO: 10 FL (ref 9–12.9)
POTASSIUM SERPL-SCNC: 3.9 MMOL/L (ref 3.6–5.5)
RBC # BLD AUTO: 4.71 M/UL (ref 4.7–6.1)
SODIUM SERPL-SCNC: 131 MMOL/L (ref 135–145)
WBC # BLD AUTO: 7.5 K/UL (ref 4.8–10.8)

## 2021-03-07 PROCEDURE — A9270 NON-COVERED ITEM OR SERVICE: HCPCS | Performed by: INTERNAL MEDICINE

## 2021-03-07 PROCEDURE — 87040 BLOOD CULTURE FOR BACTERIA: CPT

## 2021-03-07 PROCEDURE — 85025 COMPLETE CBC W/AUTO DIFF WBC: CPT

## 2021-03-07 PROCEDURE — 700102 HCHG RX REV CODE 250 W/ 637 OVERRIDE(OP): Performed by: STUDENT IN AN ORGANIZED HEALTH CARE EDUCATION/TRAINING PROGRAM

## 2021-03-07 PROCEDURE — 99232 SBSQ HOSP IP/OBS MODERATE 35: CPT | Performed by: INTERNAL MEDICINE

## 2021-03-07 PROCEDURE — 700105 HCHG RX REV CODE 258: Performed by: INTERNAL MEDICINE

## 2021-03-07 PROCEDURE — 700102 HCHG RX REV CODE 250 W/ 637 OVERRIDE(OP): Performed by: INTERNAL MEDICINE

## 2021-03-07 PROCEDURE — A9270 NON-COVERED ITEM OR SERVICE: HCPCS | Performed by: STUDENT IN AN ORGANIZED HEALTH CARE EDUCATION/TRAINING PROGRAM

## 2021-03-07 PROCEDURE — 93306 TTE W/DOPPLER COMPLETE: CPT | Mod: 26 | Performed by: INTERNAL MEDICINE

## 2021-03-07 PROCEDURE — 93306 TTE W/DOPPLER COMPLETE: CPT

## 2021-03-07 PROCEDURE — 36415 COLL VENOUS BLD VENIPUNCTURE: CPT

## 2021-03-07 PROCEDURE — 700111 HCHG RX REV CODE 636 W/ 250 OVERRIDE (IP): Performed by: INTERNAL MEDICINE

## 2021-03-07 PROCEDURE — 80048 BASIC METABOLIC PNL TOTAL CA: CPT

## 2021-03-07 PROCEDURE — 770006 HCHG ROOM/CARE - MED/SURG/GYN SEMI*

## 2021-03-07 RX ORDER — GUAIFENESIN/DEXTROMETHORPHAN 100-10MG/5
5 SYRUP ORAL EVERY 6 HOURS PRN
Status: DISCONTINUED | OUTPATIENT
Start: 2021-03-07 | End: 2021-03-10 | Stop reason: HOSPADM

## 2021-03-07 RX ORDER — IPRATROPIUM BROMIDE AND ALBUTEROL SULFATE 2.5; .5 MG/3ML; MG/3ML
3 SOLUTION RESPIRATORY (INHALATION)
Status: DISCONTINUED | OUTPATIENT
Start: 2021-03-07 | End: 2021-03-10 | Stop reason: HOSPADM

## 2021-03-07 RX ADMIN — METOPROLOL TARTRATE 25 MG: 25 TABLET, FILM COATED ORAL at 08:12

## 2021-03-07 RX ADMIN — METOPROLOL TARTRATE 25 MG: 25 TABLET, FILM COATED ORAL at 20:03

## 2021-03-07 RX ADMIN — LOVASTATIN 20 MG: 20 TABLET ORAL at 06:32

## 2021-03-07 RX ADMIN — ENOXAPARIN SODIUM 40 MG: 40 INJECTION SUBCUTANEOUS at 06:31

## 2021-03-07 RX ADMIN — ACETAMINOPHEN 650 MG: 325 TABLET, FILM COATED ORAL at 20:03

## 2021-03-07 RX ADMIN — LISINOPRIL 40 MG: 20 TABLET ORAL at 08:13

## 2021-03-07 RX ADMIN — ASPIRIN 81 MG: 81 TABLET, COATED ORAL at 06:31

## 2021-03-07 RX ADMIN — CEFTRIAXONE SODIUM 2 G: 2 INJECTION, POWDER, FOR SOLUTION INTRAMUSCULAR; INTRAVENOUS at 06:32

## 2021-03-07 RX ADMIN — GUAIFENESIN AND DEXTROMETHORPHAN 5 ML: 100; 10 SYRUP ORAL at 06:32

## 2021-03-07 RX ADMIN — CYANOCOBALAMIN TAB 500 MCG 2000 MCG: 500 TAB at 06:31

## 2021-03-07 ASSESSMENT — ENCOUNTER SYMPTOMS
NAUSEA: 0
ABDOMINAL PAIN: 0
SHORTNESS OF BREATH: 0
DEPRESSION: 0
MYALGIAS: 0
DIZZINESS: 0
CHILLS: 0
HEADACHES: 0
FEVER: 0
VOMITING: 0

## 2021-03-07 ASSESSMENT — FIBROSIS 4 INDEX: FIB4 SCORE: 1.56

## 2021-03-07 ASSESSMENT — PAIN DESCRIPTION - PAIN TYPE
TYPE: ACUTE PAIN
TYPE: ACUTE PAIN

## 2021-03-07 NOTE — PROGRESS NOTES
2 RN Skin Check    2 RN skin check complete.   Devices in place: Nasal Cannula.  Skin assessed under devices: Yes.  Confirmed pressure ulcers found on: N/a.  New potential pressure ulcers noted on N/a. Wound consult placed: Yes.  The following interventions in place: Wound consult in place.    Patient has a right gluteal abscess with blue string in place. Blister found on patient's left upper arm.

## 2021-03-07 NOTE — H&P
Hospital Medicine History & Physical Note    Date of Service  3/6/2021    Primary Care Physician  CHOCO Lopez.    Consultants  Wound care    Code Status  Full Code    Chief Complaint  Chief Complaint   Patient presents with   • Sent by MD     sent here for positive BC from yesterday. was seen for right gluteal cyst. was told by pcp to get evaluated and to get blood work done.    • Cough     c/o coughing up whitish sputum yesterday. states has hx of pneumonia       History of Presenting Illness  70 y.o. male who presented 3/6/2021 with worsening of right gluteal abscess. It was I&D'ed in ED on 3/5 and patient believes that it is continuing to worsen. On 3/5, patient had blood cultures drawn which both bottles are now growing gram positive cocci, likely staph species though it is coagulase negative so not MRSA. Patient denies any fever or chills. He does report cough, however denies any difficulty breathing. In ED, patient was started on Rocephin and will be admitted for repeat cultures and IV antibiotics and ECHO.     Review of Systems  Review of Systems   Constitutional: Negative.    HENT: Negative.    Eyes: Negative.    Respiratory: Negative.    Cardiovascular: Negative.    Gastrointestinal: Negative.    Genitourinary: Negative.    Musculoskeletal: Negative.    Skin:        Right buttock wound worsening   Neurological: Negative.    Psychiatric/Behavioral: Negative.        Past Medical History   has a past medical history of Arthritis, Arthritis, Aspiration pneumonia (HCC) (12/27/2011), Backpain, Congestive heart failure (HCC), Daytime sleepiness, Diabetes, Diabetes (HCC), ETOH abuse, ETOHism (HCC), Fall, Glaucoma, Glaucoma, Hypertension, Indigestion, Seizure (HCC), Sleep apnea, and Wears glasses.    Surgical History   has a past surgical history that includes pr amputate thigh,thru femur; other; other orthopedic surgery; other (1970); and pr amputation low leg thru tib/fib.     Family History  family  history includes No Known Problems in his father and mother.     Social History   reports that he quit smoking about 3 years ago. He has a 8.75 pack-year smoking history. He has never used smokeless tobacco. He reports that he does not drink alcohol and does not use drugs.    Allergies  Allergies   Allergen Reactions   • Banana Swelling     Pt reports when bananas are consumed pt develops an itchy and swollen mouth and eyes water.         Medications  Prior to Admission Medications   Prescriptions Last Dose Informant Patient Reported? Taking?   Cyanocobalamin (VITAMIN B-12 PO) 3/6/2021 at 0700 Patient Yes No   Sig: Take 100 mg by mouth every morning.   aspirin EC (ECOTRIN) 81 MG TBEC 3/6/2021 at 0700 Patient Yes No   Sig: Take 81 mg by mouth every morning.   calcium carbonate (TUMS) 500 MG Chew Tab 2~3 days ago at PRN Patient Yes No   Sig: Chew 1,000 mg 3 times a day as needed. 2 tablets = 1000 mg.  Indications: Heartburn   ibuprofen (MOTRIN) 200 MG Tab 2~3 days ago at PRN Patient Yes No   Sig: Take 200 mg by mouth every 6 hours as needed for Mild Pain.   lisinopril (PRINIVIL, ZESTRIL) 40 MG tablet 3/6/2021 at 0700 Patient Yes No   Sig: Take 40 mg by mouth every morning.   lovastatin (MEVACOR) 20 MG Tab   Yes No   Sig: Take 20 mg by mouth every day.   metoprolol (LOPRESSOR) 25 MG TABS 3/6/2021 at 0700 Patient Yes No   Sig: Take 25 mg by mouth 2 times a day.   simvastatin (ZOCOR) 10 MG Tab   Yes No   Sig: Take 10 mg by mouth every evening.   sulfamethoxazole-trimethoprim (BACTRIM DS) 800-160 MG tablet 3/6/2021 at 0700 Patient No No   Sig: Take 1 tablet by mouth 2 times a day for 5 days.   Patient taking differently: Take 1 tablet by mouth 2 times a day. 5 day course started 3/5/2021 in the PM.   zolpidem (AMBIEN) 5 MG Tab NOT TAKING at NOT TAKING Patient No No   Sig: Take 1-2 tablets by mouth every evening as needed for insomnia. Bring to sleep study.   Patient not taking: Reported on 3/6/2021       Facility-Administered Medications: None       Physical Exam  Temp:  [36.4 °C (97.6 °F)] 36.4 °C (97.6 °F)  Pulse:  [62-82] 66  Resp:  [12-20] 16  BP: (100-167)/(56-82) 132/62  SpO2:  [81 %-97 %] 95 %    Physical Exam  Vitals and nursing note reviewed.   Constitutional:       General: He is not in acute distress.     Appearance: Normal appearance. He is obese. He is not ill-appearing.   HENT:      Head: Normocephalic and atraumatic.      Mouth/Throat:      Mouth: Mucous membranes are moist.      Pharynx: Oropharynx is clear.   Eyes:      General: No scleral icterus.     Extraocular Movements: Extraocular movements intact.      Conjunctiva/sclera: Conjunctivae normal.      Pupils: Pupils are equal, round, and reactive to light.   Cardiovascular:      Rate and Rhythm: Normal rate and regular rhythm.      Pulses: Normal pulses.      Heart sounds: Normal heart sounds.      Comments: No appreciable murmur heard though difficult exam due to body habitus  Pulmonary:      Effort: Pulmonary effort is normal.      Breath sounds: Normal breath sounds. No rhonchi.   Abdominal:      General: Abdomen is flat. Bowel sounds are normal. There is no distension.      Palpations: Abdomen is soft.      Tenderness: There is no abdominal tenderness.   Musculoskeletal:         General: No swelling or tenderness.      Cervical back: Normal range of motion and neck supple. No rigidity.      Comments: Left AKA   Skin:     Comments: Right gluteal wound dressed currently, nontender, no surrounding erythema   Neurological:      General: No focal deficit present.      Mental Status: He is alert and oriented to person, place, and time. Mental status is at baseline.   Psychiatric:         Mood and Affect: Mood normal.         Behavior: Behavior normal.         Thought Content: Thought content normal.         Judgment: Judgment normal.         Laboratory:  Recent Labs     03/05/21  0206 03/06/21  1150   WBC 10.4 8.0   RBC 4.74 4.84   HEMOGLOBIN  14.2 14.7   HEMATOCRIT 44.1 43.8   MCV 93.0 90.5   MCH 30.0 30.4   MCHC 32.2* 33.6*   RDW 45.5 43.8   PLATELETCT 262 278   MPV 9.8 9.7     Recent Labs     03/05/21  0206 03/06/21  1150   SODIUM 139 134*   POTASSIUM 4.3 4.2   CHLORIDE 106 101   CO2 23 21   GLUCOSE 105* 92   BUN 13 10   CREATININE 0.86 1.09   CALCIUM 8.8 9.3     Recent Labs     03/05/21  0206 03/06/21  1150   ALTSGPT 18  --    ASTSGOT 27  --    ALKPHOSPHAT 106*  --    TBILIRUBIN 0.2  --    GLUCOSE 105* 92         No results for input(s): NTPROBNP in the last 72 hours.      No results for input(s): TROPONINT in the last 72 hours.    Imaging:  CT-CTA CHEST PULMONARY ARTERY W/ RECONS   Final Result      No evidence of large central or lobar branch emboli. Segmental branch emboli difficult to exclude due to patient body habitus and poor contrast opacification of the pulmonary arteries.      Cardiomegaly with coronary artery calcifications.      Peripheral interstitial prominence lung bases suggesting interstitial lung disease            DX-CHEST-PORTABLE (1 VIEW)   Final Result      Stable chest without acute/new abnormality.      EC-ECHOCARDIOGRAM COMPLETE W/O CONT    (Results Pending)         Assessment/Plan:  I anticipate this patient will require at least two midnights for appropriate medical management, necessitating inpatient admission.    * Bacteremia- (present on admission)  Assessment & Plan  -Blood cultures repeated  -Follow up blood culture from 3/5 for speciation and sensitivity  -Will cover with rocephin 2gm q24 hours, currently coag neg staph  -Monitor for fever  -ECHO    DM (diabetes mellitus) (HCC)- (present on admission)  Assessment & Plan  -Not on home meds  -Will monitor POCT glucose and cover as needed    Essential hypertension- (present on admission)  Assessment & Plan  -Continue home meds

## 2021-03-07 NOTE — PROGRESS NOTES
Received ER report from Lisy and assumed care of patient. Pt is A&Ox4 and awake in bed. Patient showing no signs of distress, complaints of back pain, and is on RA. Call light within reach, bed in low position, 2 side rails up, and belongings at bedside table.  Pt was updated on Plan of Care, no questions or concerns. Pt educated on use of call light for assistance. Will continue to monitor.

## 2021-03-07 NOTE — CARE PLAN
Problem: Respiratory:  Goal: Respiratory status will improve  Outcome: PROGRESSING AS EXPECTED   Patient is on oxygen 2L with oxygen saturation of 98%. Patient has a dry cough.     Problem: Safety  Goal: Will remain free from injury  Outcome: PROGRESSING AS EXPECTED  Educated patient on fall risk. Educated patient to call for assistance.      Problem: Knowledge Deficit  Goal: Knowledge of disease process/condition, treatment plan, diagnostic tests, and medications will improve  Outcome: PROGRESSING AS EXPECTED

## 2021-03-07 NOTE — ASSESSMENT & PLAN NOTE
-Blood cultures repeated, negative to date   -blood culture 3/5- staph species, final culture per EMR.    -Changed to oral bactrim, currently coag neg staph  -Echo EF 60%, no valvular vegetations noted

## 2021-03-07 NOTE — PROGRESS NOTES
[facility] Medicine Daily Progress Note    Date of Service  3/7/2021    Chief Complaint  70 y.o. male admitted 3/6/2021 with worsening right gluteal abscess.    Hospital Course  70-year-old male with past medical history of diabetes, hypertension, CHF who presented 3/6/2021 for worsening right gluteal abscess.  Patient was seen in the ER 3/5 and abscess was I&D at that time.  Blood cultures from that visit are growing gram-positive cocci.  Patient was admitted, started on IV antibiotics and repeat cultures ordered.    Interval Problem Update  No acute events overnight, vital signs stable.  Culture from 3/5 growing coagulase-negative staph species.  Repeat cultures pending. Wound care pending.  Patient denies any complaints, denies fevers, chills.    Consultants/Specialty  N/A    Code Status  Full Code    Disposition  Pending     Review of Systems  Review of Systems   Constitutional: Negative for chills and fever.   Respiratory: Negative for shortness of breath.    Cardiovascular: Negative for chest pain.   Gastrointestinal: Negative for abdominal pain, nausea and vomiting.   Genitourinary: Negative for dysuria.   Musculoskeletal: Negative for myalgias.   Skin: Negative for rash.   Neurological: Negative for dizziness and headaches.   Psychiatric/Behavioral: Negative for depression.   All other systems reviewed and are negative.       Physical Exam  Temp:  [36.1 °C (97 °F)-36.5 °C (97.7 °F)] 36.4 °C (97.5 °F)  Pulse:  [60-74] 62  Resp:  [12-20] 18  BP: ()/(40-77) 120/69  SpO2:  [81 %-98 %] 98 %    Physical Exam  Vitals and nursing note reviewed.   Constitutional:       General: He is not in acute distress.     Appearance: Normal appearance.   HENT:      Head: Normocephalic and atraumatic.   Eyes:      Conjunctiva/sclera: Conjunctivae normal.      Pupils: Pupils are equal, round, and reactive to light.   Cardiovascular:      Rate and Rhythm: Normal rate and regular rhythm.      Pulses: Normal pulses.   Pulmonary:       Effort: Pulmonary effort is normal. No respiratory distress.      Breath sounds: Normal breath sounds. No wheezing.   Abdominal:      General: Abdomen is flat. There is no distension.      Palpations: Abdomen is soft.      Tenderness: There is no abdominal tenderness.   Musculoskeletal:         General: No swelling. Normal range of motion.      Cervical back: Normal range of motion and neck supple.   Skin:     General: Skin is warm and dry.      Comments: Bandage over gluteal wound c/d/i   Neurological:      General: No focal deficit present.      Mental Status: He is alert and oriented to person, place, and time.      Cranial Nerves: No cranial nerve deficit.   Psychiatric:         Mood and Affect: Mood normal.         Behavior: Behavior normal.         Fluids    Intake/Output Summary (Last 24 hours) at 3/7/2021 1211  Last data filed at 3/7/2021 0900  Gross per 24 hour   Intake 580 ml   Output 350 ml   Net 230 ml       Laboratory  Recent Labs     03/05/21  0206 03/06/21  1150 03/07/21  0406   WBC 10.4 8.0 7.5   RBC 4.74 4.84 4.71   HEMOGLOBIN 14.2 14.7 14.1   HEMATOCRIT 44.1 43.8 43.0   MCV 93.0 90.5 91.3   MCH 30.0 30.4 29.9   MCHC 32.2* 33.6* 32.8*   RDW 45.5 43.8 44.0   PLATELETCT 262 278 258   MPV 9.8 9.7 10.0     Recent Labs     03/05/21  0206 03/06/21  1150 03/07/21  0406   SODIUM 139 134* 131*   POTASSIUM 4.3 4.2 3.9   CHLORIDE 106 101 100   CO2 23 21 22   GLUCOSE 105* 92 103*   BUN 13 10 14   CREATININE 0.86 1.09 1.12   CALCIUM 8.8 9.3 9.1                   Imaging  CT-CTA CHEST PULMONARY ARTERY W/ RECONS   Final Result      No evidence of large central or lobar branch emboli. Segmental branch emboli difficult to exclude due to patient body habitus and poor contrast opacification of the pulmonary arteries.      Cardiomegaly with coronary artery calcifications.      Peripheral interstitial prominence lung bases suggesting interstitial lung disease            DX-CHEST-PORTABLE (1 VIEW)   Final Result       Stable chest without acute/new abnormality.      EC-ECHOCARDIOGRAM COMPLETE W/O CONT    (Results Pending)        Assessment/Plan  * Bacteremia- (present on admission)  Assessment & Plan  -Blood cultures repeated, pending   -Follow up blood culture from 3/5 for speciation and sensitivity  -Will cover with rocephin 2gm q24 hours, currently coag neg staph  -Monitor for fever  -ECHO    DM (diabetes mellitus) (HCC)- (present on admission)  Assessment & Plan  -Not on home meds  -Will monitor POCT glucose and cover as needed    Hyponatremia  Assessment & Plan  Mild, continue to monitor     Essential hypertension- (present on admission)  Assessment & Plan  -Continue home meds       VTE prophylaxis: Lovenox

## 2021-03-07 NOTE — ED NOTES
Med rec PARTIAL per Pt at bedside. Pt unsure of the strengths of his medications (lisinopril, metoprolol) and unable to specify whether he takes lovastatin or simvastatin. Unable to verify with Pt's pharmacy [facility] () as it is closed during the weekend; pharmacy reopens Monday at 0800. Verbalized request to Pt that he have a family member verify the medication information on his prescription bottles at home if possible. Last statin dose yesterday 3/5/2021 at 1900.  Allergies reviewed with Pt. No known drug allergies.  Pt is currently on a 5 day course of Bactrim DS 1 tablet twice per day started yesterday 3/5/2021 in the evening. Pt has taken two doses so far.  Pt takes ASPIRIN.

## 2021-03-08 LAB
ALBUMIN SERPL BCP-MCNC: 3.6 G/DL (ref 3.2–4.9)
BACTERIA BLD CULT: ABNORMAL
BUN SERPL-MCNC: 15 MG/DL (ref 8–22)
CALCIUM SERPL-MCNC: 9.1 MG/DL (ref 8.5–10.5)
CHLORIDE SERPL-SCNC: 99 MMOL/L (ref 96–112)
CO2 SERPL-SCNC: 23 MMOL/L (ref 20–33)
CREAT SERPL-MCNC: 1.16 MG/DL (ref 0.5–1.4)
ERYTHROCYTE [DISTWIDTH] IN BLOOD BY AUTOMATED COUNT: 43.8 FL (ref 35.9–50)
EST. AVERAGE GLUCOSE BLD GHB EST-MCNC: 131 MG/DL
GLUCOSE SERPL-MCNC: 96 MG/DL (ref 65–99)
HBA1C MFR BLD: 6.2 % (ref 4–5.6)
HCT VFR BLD AUTO: 43.8 % (ref 42–52)
HGB BLD-MCNC: 14.3 G/DL (ref 14–18)
MCH RBC QN AUTO: 29.7 PG (ref 27–33)
MCHC RBC AUTO-ENTMCNC: 32.6 G/DL (ref 33.7–35.3)
MCV RBC AUTO: 91.1 FL (ref 81.4–97.8)
PHOSPHATE SERPL-MCNC: 3.2 MG/DL (ref 2.5–4.5)
PLATELET # BLD AUTO: 261 K/UL (ref 164–446)
PMV BLD AUTO: 9.6 FL (ref 9–12.9)
POTASSIUM SERPL-SCNC: 4.1 MMOL/L (ref 3.6–5.5)
RBC # BLD AUTO: 4.81 M/UL (ref 4.7–6.1)
SIGNIFICANT IND 70042: ABNORMAL
SIGNIFICANT IND 70042: ABNORMAL
SITE SITE: ABNORMAL
SITE SITE: ABNORMAL
SODIUM SERPL-SCNC: 134 MMOL/L (ref 135–145)
SOURCE SOURCE: ABNORMAL
SOURCE SOURCE: ABNORMAL
WBC # BLD AUTO: 6.7 K/UL (ref 4.8–10.8)

## 2021-03-08 PROCEDURE — 700111 HCHG RX REV CODE 636 W/ 250 OVERRIDE (IP): Performed by: INTERNAL MEDICINE

## 2021-03-08 PROCEDURE — 700102 HCHG RX REV CODE 250 W/ 637 OVERRIDE(OP): Performed by: INTERNAL MEDICINE

## 2021-03-08 PROCEDURE — 85027 COMPLETE CBC AUTOMATED: CPT

## 2021-03-08 PROCEDURE — 83036 HEMOGLOBIN GLYCOSYLATED A1C: CPT

## 2021-03-08 PROCEDURE — A9270 NON-COVERED ITEM OR SERVICE: HCPCS | Performed by: STUDENT IN AN ORGANIZED HEALTH CARE EDUCATION/TRAINING PROGRAM

## 2021-03-08 PROCEDURE — 700105 HCHG RX REV CODE 258: Performed by: INTERNAL MEDICINE

## 2021-03-08 PROCEDURE — A9270 NON-COVERED ITEM OR SERVICE: HCPCS | Performed by: INTERNAL MEDICINE

## 2021-03-08 PROCEDURE — 36415 COLL VENOUS BLD VENIPUNCTURE: CPT

## 2021-03-08 PROCEDURE — 99232 SBSQ HOSP IP/OBS MODERATE 35: CPT | Performed by: INTERNAL MEDICINE

## 2021-03-08 PROCEDURE — 700102 HCHG RX REV CODE 250 W/ 637 OVERRIDE(OP): Performed by: STUDENT IN AN ORGANIZED HEALTH CARE EDUCATION/TRAINING PROGRAM

## 2021-03-08 PROCEDURE — 80069 RENAL FUNCTION PANEL: CPT

## 2021-03-08 PROCEDURE — 770006 HCHG ROOM/CARE - MED/SURG/GYN SEMI*

## 2021-03-08 RX ORDER — SULFAMETHOXAZOLE AND TRIMETHOPRIM 400; 80 MG/1; MG/1
1 TABLET ORAL EVERY 12 HOURS
Status: DISCONTINUED | OUTPATIENT
Start: 2021-03-08 | End: 2021-03-10 | Stop reason: HOSPADM

## 2021-03-08 RX ORDER — GUAIFENESIN 600 MG/1
600 TABLET, EXTENDED RELEASE ORAL EVERY 12 HOURS
Status: DISCONTINUED | OUTPATIENT
Start: 2021-03-08 | End: 2021-03-10 | Stop reason: HOSPADM

## 2021-03-08 RX ADMIN — CEFTRIAXONE SODIUM 2 G: 2 INJECTION, POWDER, FOR SOLUTION INTRAMUSCULAR; INTRAVENOUS at 05:28

## 2021-03-08 RX ADMIN — ENOXAPARIN SODIUM 40 MG: 40 INJECTION SUBCUTANEOUS at 05:26

## 2021-03-08 RX ADMIN — SULFAMETHOXAZOLE AND TRIMETHOPRIM 1 TABLET: 400; 80 TABLET ORAL at 17:47

## 2021-03-08 RX ADMIN — METOPROLOL TARTRATE 25 MG: 25 TABLET, FILM COATED ORAL at 20:01

## 2021-03-08 RX ADMIN — GUAIFENESIN AND DEXTROMETHORPHAN 5 ML: 100; 10 SYRUP ORAL at 17:32

## 2021-03-08 RX ADMIN — CYANOCOBALAMIN TAB 500 MCG 2000 MCG: 500 TAB at 05:27

## 2021-03-08 RX ADMIN — LOVASTATIN 20 MG: 20 TABLET ORAL at 05:27

## 2021-03-08 RX ADMIN — ASPIRIN 81 MG: 81 TABLET, COATED ORAL at 05:27

## 2021-03-08 RX ADMIN — METOPROLOL TARTRATE 25 MG: 25 TABLET, FILM COATED ORAL at 08:24

## 2021-03-08 RX ADMIN — LISINOPRIL 40 MG: 20 TABLET ORAL at 05:27

## 2021-03-08 RX ADMIN — GUAIFENESIN 600 MG: 600 TABLET, EXTENDED RELEASE ORAL at 11:43

## 2021-03-08 RX ADMIN — ACETAMINOPHEN 650 MG: 325 TABLET, FILM COATED ORAL at 17:47

## 2021-03-08 ASSESSMENT — ENCOUNTER SYMPTOMS
DEPRESSION: 0
MYALGIAS: 0
VOMITING: 0
FEVER: 0
SHORTNESS OF BREATH: 0
ABDOMINAL PAIN: 0
CHILLS: 0
HEADACHES: 0
NAUSEA: 0
COUGH: 1
DIZZINESS: 0

## 2021-03-08 ASSESSMENT — PAIN DESCRIPTION - PAIN TYPE
TYPE: ACUTE PAIN
TYPE: ACUTE PAIN

## 2021-03-08 NOTE — PROGRESS NOTES
Hospital Medicine Daily Progress Note    Date of Service  3/8/2021    Chief Complaint  70 y.o. male admitted 3/6/2021 with worsening right gluteal abscess.    Hospital Course  70-year-old male with past medical history of diabetes, hypertension, CHF who presented 3/6/2021 for worsening right gluteal abscess.  Patient was seen in the ER 3/5 and abscess was I&D at that time.  Blood cultures from that visit are growing gram-positive cocci.  Patient was admitted, started on IV antibiotics and repeat cultures ordered.    Interval Problem Update  No acute events overnight.  Patient's vital signs are stable.  Echo performed yesterday showed EF 60% with indeterminate diastolic function and no valvular vegetations noted.  So far repeat blood cultures negative to date. Patient complaining of congestion and post nasal drip this morning will start mucinex otherwise no other complaints.     Consultants/Specialty  N/A     Code Status  Full Code    Disposition  Pending culture results     Review of Systems  Review of Systems   Constitutional: Negative for chills and fever.   HENT: Positive for congestion.    Respiratory: Positive for cough. Negative for shortness of breath.    Cardiovascular: Negative for chest pain.   Gastrointestinal: Negative for abdominal pain, nausea and vomiting.   Genitourinary: Negative for dysuria.   Musculoskeletal: Negative for myalgias.   Skin: Negative for rash.   Neurological: Negative for dizziness and headaches.   Psychiatric/Behavioral: Negative for depression.   All other systems reviewed and are negative.       Physical Exam  Temp:  [36.1 °C (97 °F)-36.4 °C (97.6 °F)] 36.2 °C (97.1 °F)  Pulse:  [60-68] 62  Resp:  [18-19] 18  BP: (104-118)/(48-72) 106/48  SpO2:  [91 %-99 %] 91 %    Physical Exam  Vitals and nursing note reviewed.   Constitutional:       General: He is not in acute distress.     Appearance: Normal appearance.   HENT:      Head: Normocephalic and atraumatic.   Eyes:       Conjunctiva/sclera: Conjunctivae normal.      Pupils: Pupils are equal, round, and reactive to light.   Cardiovascular:      Rate and Rhythm: Normal rate and regular rhythm.      Pulses: Normal pulses.   Pulmonary:      Effort: Pulmonary effort is normal. No respiratory distress.      Breath sounds: Normal breath sounds. No wheezing.   Abdominal:      General: Abdomen is flat. There is no distension.      Palpations: Abdomen is soft.      Tenderness: There is no abdominal tenderness.   Musculoskeletal:         General: No swelling. Normal range of motion.      Cervical back: Normal range of motion and neck supple.   Skin:     General: Skin is warm and dry.      Comments: Bandage over gluteal wound c/d/i   Neurological:      General: No focal deficit present.      Mental Status: He is alert and oriented to person, place, and time.      Cranial Nerves: No cranial nerve deficit.   Psychiatric:         Mood and Affect: Mood normal.         Behavior: Behavior normal.         Fluids    Intake/Output Summary (Last 24 hours) at 3/8/2021 1446  Last data filed at 3/8/2021 1350  Gross per 24 hour   Intake 1320 ml   Output 1225 ml   Net 95 ml       Laboratory  Recent Labs     03/06/21  1150 03/07/21  0406 03/08/21  0415   WBC 8.0 7.5 6.7   RBC 4.84 4.71 4.81   HEMOGLOBIN 14.7 14.1 14.3   HEMATOCRIT 43.8 43.0 43.8   MCV 90.5 91.3 91.1   MCH 30.4 29.9 29.7   MCHC 33.6* 32.8* 32.6*   RDW 43.8 44.0 43.8   PLATELETCT 278 258 261   MPV 9.7 10.0 9.6     Recent Labs     03/06/21  1150 03/07/21  0406 03/08/21  0415   SODIUM 134* 131* 134*   POTASSIUM 4.2 3.9 4.1   CHLORIDE 101 100 99   CO2 21 22 23   GLUCOSE 92 103* 96   BUN 10 14 15   CREATININE 1.09 1.12 1.16   CALCIUM 9.3 9.1 9.1                   Imaging  EC-ECHOCARDIOGRAM COMPLETE W/O CONT   Final Result      CT-CTA CHEST PULMONARY ARTERY W/ RECONS   Final Result      No evidence of large central or lobar branch emboli. Segmental branch emboli difficult to exclude due to patient  body habitus and poor contrast opacification of the pulmonary arteries.      Cardiomegaly with coronary artery calcifications.      Peripheral interstitial prominence lung bases suggesting interstitial lung disease            DX-CHEST-PORTABLE (1 VIEW)   Final Result      Stable chest without acute/new abnormality.           Assessment/Plan  * Bacteremia- (present on admission)  Assessment & Plan  -Blood cultures repeated, negative to date   -blood culture 3/5- staph species, final culture pending   -Will change to oral bactrim, currently coag neg staph  -Echo EF 60%, no valvular vegetations noted     DM (diabetes mellitus) (HCC)- (present on admission)  Assessment & Plan  -Not on home meds  -Will monitor POCT glucose and cover as needed  -A1c 6.2    Hyponatremia  Assessment & Plan  Mild, continue to monitor     Essential hypertension- (present on admission)  Assessment & Plan  -Continue home meds       VTE prophylaxis: Lovenox     I have performed a physical exam and reviewed and updated ROS and Plan today (3/8/2021). In review of yesterday's note (3/7/2021), there are no changes except as documented above.

## 2021-03-08 NOTE — PROGRESS NOTES
Assumed care of patient at 0700 from Albaro German RN. Patient is A&O x 4, states pain level is 0/10. Bed locked in lowest position with 3 rails up. Call light in place, belongings at bedside. Patient expresses no needs at this time and hourly rounding is in place.

## 2021-03-08 NOTE — CARE PLAN
Problem: Communication  Goal: The ability to communicate needs accurately and effectively will improve  Outcome: PROGRESSING AS EXPECTED     Problem: Safety  Goal: Will remain free from injury  Outcome: PROGRESSING AS EXPECTED         Patient is able to verbalize needs and wants during hospital admission with no difficulty, call light is placed at bedside. Patient has bed lowered and placed in locked position.

## 2021-03-08 NOTE — PROGRESS NOTES
Report received from [facility] RN Kassidy. Assumed care, assessment complete at 2300. Pt is A & O x 4.  Pt reports a mild headache, given tylenol, see MAR Fall precautions and appropriate signs in place. Pt oriented to unit routine, call light/phone system and RN extension number provided. Pt educated regarding fall precautions. Bed alarm not in use, pt refused, patient states he will call. Pt denies any additional needs at this time. Call light within reach.

## 2021-03-08 NOTE — CARE PLAN
Problem: Communication  Goal: The ability to communicate needs accurately and effectively will improve  Outcome: PROGRESSING AS EXPECTED  Note: Patient effectively communicates via electronic call light system. Hourly rounding is in place.      Problem: Safety  Goal: Will remain free from injury  Outcome: PROGRESSING AS EXPECTED  Note: Patient is in bed with 3 rails placed up and bed alarm used. Patient belongings and call light are within reach. [place] socks are worn and bed is at the lowest position.

## 2021-03-08 NOTE — PROGRESS NOTES
Med rec updated and complete per Pt's pharmacy [facility] (). Verified strengths of Pt's lisinopril and metoprolol as well as which statin Pt is taking; Pt is currently taking SIMVASTATIN 10 mg 1 tablet per day.

## 2021-03-09 PROCEDURE — A9270 NON-COVERED ITEM OR SERVICE: HCPCS | Performed by: STUDENT IN AN ORGANIZED HEALTH CARE EDUCATION/TRAINING PROGRAM

## 2021-03-09 PROCEDURE — 770006 HCHG ROOM/CARE - MED/SURG/GYN SEMI*

## 2021-03-09 PROCEDURE — A9270 NON-COVERED ITEM OR SERVICE: HCPCS | Performed by: INTERNAL MEDICINE

## 2021-03-09 PROCEDURE — 700102 HCHG RX REV CODE 250 W/ 637 OVERRIDE(OP): Performed by: INTERNAL MEDICINE

## 2021-03-09 PROCEDURE — 700111 HCHG RX REV CODE 636 W/ 250 OVERRIDE (IP): Performed by: INTERNAL MEDICINE

## 2021-03-09 PROCEDURE — 700102 HCHG RX REV CODE 250 W/ 637 OVERRIDE(OP): Performed by: STUDENT IN AN ORGANIZED HEALTH CARE EDUCATION/TRAINING PROGRAM

## 2021-03-09 PROCEDURE — 99232 SBSQ HOSP IP/OBS MODERATE 35: CPT | Performed by: HOSPITALIST

## 2021-03-09 RX ORDER — SIMVASTATIN 20 MG
10 TABLET ORAL EVERY EVENING
Status: DISCONTINUED | OUTPATIENT
Start: 2021-03-10 | End: 2021-03-10 | Stop reason: HOSPADM

## 2021-03-09 RX ADMIN — GUAIFENESIN AND DEXTROMETHORPHAN 5 ML: 100; 10 SYRUP ORAL at 02:19

## 2021-03-09 RX ADMIN — ENOXAPARIN SODIUM 40 MG: 40 INJECTION SUBCUTANEOUS at 05:11

## 2021-03-09 RX ADMIN — ASPIRIN 81 MG: 81 TABLET, COATED ORAL at 05:11

## 2021-03-09 RX ADMIN — METOPROLOL TARTRATE 25 MG: 25 TABLET, FILM COATED ORAL at 19:50

## 2021-03-09 RX ADMIN — LOVASTATIN 20 MG: 20 TABLET ORAL at 05:11

## 2021-03-09 RX ADMIN — METOPROLOL TARTRATE 25 MG: 25 TABLET, FILM COATED ORAL at 07:56

## 2021-03-09 RX ADMIN — GUAIFENESIN 600 MG: 600 TABLET, EXTENDED RELEASE ORAL at 05:11

## 2021-03-09 RX ADMIN — SULFAMETHOXAZOLE AND TRIMETHOPRIM 1 TABLET: 400; 80 TABLET ORAL at 17:15

## 2021-03-09 RX ADMIN — SULFAMETHOXAZOLE AND TRIMETHOPRIM 1 TABLET: 400; 80 TABLET ORAL at 05:11

## 2021-03-09 RX ADMIN — LISINOPRIL 40 MG: 20 TABLET ORAL at 05:11

## 2021-03-09 RX ADMIN — GUAIFENESIN 600 MG: 600 TABLET, EXTENDED RELEASE ORAL at 17:15

## 2021-03-09 RX ADMIN — CYANOCOBALAMIN TAB 500 MCG 2000 MCG: 500 TAB at 05:11

## 2021-03-09 RX ADMIN — ACETAMINOPHEN 650 MG: 325 TABLET, FILM COATED ORAL at 19:49

## 2021-03-09 RX ADMIN — DOCUSATE SODIUM 50 MG AND SENNOSIDES 8.6 MG 2 TABLET: 8.6; 5 TABLET, FILM COATED ORAL at 17:15

## 2021-03-09 ASSESSMENT — PAIN DESCRIPTION - PAIN TYPE
TYPE: ACUTE PAIN
TYPE: ACUTE PAIN

## 2021-03-09 ASSESSMENT — ENCOUNTER SYMPTOMS
FEVER: 0
MYALGIAS: 0
DEPRESSION: 0
DIZZINESS: 0
NAUSEA: 0
HEADACHES: 0
VOMITING: 0
SHORTNESS OF BREATH: 0
CHILLS: 0
ABDOMINAL PAIN: 0
COUGH: 1

## 2021-03-09 NOTE — CARE PLAN
Problem: Respiratory:  Goal: Respiratory status will improve  Outcome: PROGRESSING AS EXPECTED  Note: Pt remains on room air with no c/o SOB or respiratory distress.     Problem: Communication  Goal: The ability to communicate needs accurately and effectively will improve  Note: Pt able to verbalize needs to staff. Uses call light appropriately.      Problem: Infection  Goal: Will remain free from infection  Note: Remains on PO ABX, no adverse reaction noted. VSS.

## 2021-03-09 NOTE — PROGRESS NOTES
Assume care of pt at 1900. Report received from day RN. Pt is A/O x4. Pt denies any pain or discomfort. Dressing CDI to right buttocks. Pt is resting in bed. Bed in lowest and locked position, call light in reach, hourly rounding in place. Bed alarm in place. Labs reviewed. Communication board updated. Will continue to monitor.

## 2021-03-09 NOTE — CARE PLAN
Problem: Communication  Goal: The ability to communicate needs accurately and effectively will improve  Outcome: PROGRESSING AS EXPECTED  Note: Patient communicates effectively via electronic call system. Hourly rounding is in place.      Problem: Safety  Goal: Will remain free from injury  Outcome: PROGRESSING AS EXPECTED  Note: Patient is in bed with 3 rails placed up. Patient belongings and call light are within reach. [place] socks are worn and bed is at the lowest position.

## 2021-03-09 NOTE — PROGRESS NOTES
Assumed care of patient at 0700 from Melissa Allison RN. Patient is A&O x 4, states pain level is 2/10. Bed locked in lowest position with 3 rails up. Call light in place, belongings at bedside. Patient expresses no needs at this time and hourly rounding is in place.

## 2021-03-10 VITALS
WEIGHT: 251.1 LBS | BODY MASS INDEX: 39.41 KG/M2 | OXYGEN SATURATION: 90 % | HEIGHT: 67 IN | SYSTOLIC BLOOD PRESSURE: 105 MMHG | DIASTOLIC BLOOD PRESSURE: 61 MMHG | HEART RATE: 63 BPM | RESPIRATION RATE: 17 BRPM | TEMPERATURE: 98.7 F

## 2021-03-10 PROBLEM — R78.81 BACTEREMIA: Status: RESOLVED | Noted: 2021-03-06 | Resolved: 2021-03-10

## 2021-03-10 LAB
ANION GAP SERPL CALC-SCNC: 12 MMOL/L (ref 7–16)
BUN SERPL-MCNC: 20 MG/DL (ref 8–22)
CALCIUM SERPL-MCNC: 9.2 MG/DL (ref 8.5–10.5)
CHLORIDE SERPL-SCNC: 99 MMOL/L (ref 96–112)
CO2 SERPL-SCNC: 21 MMOL/L (ref 20–33)
CREAT SERPL-MCNC: 1.36 MG/DL (ref 0.5–1.4)
ERYTHROCYTE [DISTWIDTH] IN BLOOD BY AUTOMATED COUNT: 43.8 FL (ref 35.9–50)
GLUCOSE SERPL-MCNC: 87 MG/DL (ref 65–99)
HCT VFR BLD AUTO: 45.8 % (ref 42–52)
HGB BLD-MCNC: 14.8 G/DL (ref 14–18)
MCH RBC QN AUTO: 30 PG (ref 27–33)
MCHC RBC AUTO-ENTMCNC: 32.3 G/DL (ref 33.7–35.3)
MCV RBC AUTO: 92.7 FL (ref 81.4–97.8)
PLATELET # BLD AUTO: 269 K/UL (ref 164–446)
PMV BLD AUTO: 9 FL (ref 9–12.9)
POTASSIUM SERPL-SCNC: 4.4 MMOL/L (ref 3.6–5.5)
RBC # BLD AUTO: 4.94 M/UL (ref 4.7–6.1)
SODIUM SERPL-SCNC: 132 MMOL/L (ref 135–145)
WBC # BLD AUTO: 8.6 K/UL (ref 4.8–10.8)

## 2021-03-10 PROCEDURE — 85027 COMPLETE CBC AUTOMATED: CPT

## 2021-03-10 PROCEDURE — A9270 NON-COVERED ITEM OR SERVICE: HCPCS | Performed by: INTERNAL MEDICINE

## 2021-03-10 PROCEDURE — 700102 HCHG RX REV CODE 250 W/ 637 OVERRIDE(OP): Performed by: INTERNAL MEDICINE

## 2021-03-10 PROCEDURE — 36415 COLL VENOUS BLD VENIPUNCTURE: CPT

## 2021-03-10 PROCEDURE — 80048 BASIC METABOLIC PNL TOTAL CA: CPT

## 2021-03-10 PROCEDURE — 99239 HOSP IP/OBS DSCHRG MGMT >30: CPT | Performed by: HOSPITALIST

## 2021-03-10 PROCEDURE — 700111 HCHG RX REV CODE 636 W/ 250 OVERRIDE (IP): Performed by: INTERNAL MEDICINE

## 2021-03-10 RX ADMIN — CYANOCOBALAMIN TAB 500 MCG 2000 MCG: 500 TAB at 05:10

## 2021-03-10 RX ADMIN — ASPIRIN 81 MG: 81 TABLET, COATED ORAL at 05:10

## 2021-03-10 RX ADMIN — GUAIFENESIN 600 MG: 600 TABLET, EXTENDED RELEASE ORAL at 05:09

## 2021-03-10 RX ADMIN — LISINOPRIL 40 MG: 20 TABLET ORAL at 05:09

## 2021-03-10 RX ADMIN — ENOXAPARIN SODIUM 40 MG: 40 INJECTION SUBCUTANEOUS at 05:10

## 2021-03-10 RX ADMIN — SULFAMETHOXAZOLE AND TRIMETHOPRIM 1 TABLET: 400; 80 TABLET ORAL at 05:16

## 2021-03-10 NOTE — PROGRESS NOTES
"Received alert and oriented x 4. Assessment completed and recorded accordingly and due med given per MAR. Pt c/o of 3/10, medicated pt per MAR. Call light within reach. Bed alarm in placed. Needs attended. Will continue to monitor./57   Pulse 77   Temp 36.2 °C (97.1 °F) (Temporal)   Resp 17   Ht 1.702 m (5' 7\")   Wt 114 kg (251 lb 1.7 oz)   SpO2 92%   BMI 39.33 kg/m² .  "

## 2021-03-10 NOTE — DISCHARGE PLANNING
Anticipated Discharge Disposition: home    Action: reviewed with Dr Bro pt ok to dc home with no needs.     Barriers to Discharge:     Plan: dc to home no needs.

## 2021-03-10 NOTE — WOUND TEAM
Wound team consulted for patient's drain from I&D in the ED.    Drain is in place, no advance wound care needed at this time.         Wound 03/06/21 Other (comment) Buttocks Right (Active)   Wound Image      Site Assessment Pink    Periwound Assessment Pink    Margins Defined edges;Attached edges    Closure Secondary intention    Drainage Amount None    Drainage Description DERIC    Wound Cleansing Approved Wound Cleanser    Periwound Protectant Skin Protectant Wipes to Periwound    Dressing Cleansing/Solutions Not Applicable    Dressing Options Dry Gauze;Tape    Dressing Changed Changed    Dressing Status Clean;Dry;Intact    Dressing Change/Treatment Frequency Daily, and As Needed    NEXT Dressing Change/Treatment Date 03/10/21    NEXT Weekly Photo (Inpatient Only) 03/16/21    Non-staged Wound Description Not applicable    Wound Odor None    Pulses N/A    Exposed Structures None    WOUND NURSE ONLY - Time Spent with Patient (mins) 30

## 2021-03-10 NOTE — CARE PLAN
Problem: Respiratory:  Goal: Respiratory status will improve  Outcome: MET     Problem: Communication  Goal: The ability to communicate needs accurately and effectively will improve  3/10/2021 0856 by Debi Yun RZULEYMA.  Outcome: MET  3/10/2021 0856 by Debi Yun R.N.  Outcome: PROGRESSING AS EXPECTED     Problem: Safety  Goal: Will remain free from injury  Outcome: MET  Goal: Will remain free from falls  Outcome: MET     Problem: Infection  Goal: Will remain free from infection  Outcome: MET     Problem: Venous Thromboembolism (VTW)/Deep Vein Thrombosis (DVT) Prevention:  Goal: Patient will participate in Venous Thrombosis (VTE)/Deep Vein Thrombosis (DVT)Prevention Measures  Outcome: MET     Problem: Bowel/Gastric:  Goal: Normal bowel function is maintained or improved  Outcome: MET  Goal: Will not experience complications related to bowel motility  Outcome: MET     Problem: Knowledge Deficit  Goal: Knowledge of disease process/condition, treatment plan, diagnostic tests, and medications will improve  Outcome: MET  Goal: Knowledge of the prescribed therapeutic regimen will improve  Outcome: MET     Problem: Discharge Barriers/Planning  Goal: Patient's continuum of care needs will be met  Outcome: MET     Problem: Pain Management  Goal: Pain level will decrease to patient's comfort goal  Outcome: MET

## 2021-03-10 NOTE — CARE PLAN
Problem: Communication  Goal: The ability to communicate needs accurately and effectively will improve  Note: Uses call light to alert staff of needs.      Problem: Infection  Goal: Will remain free from infection  Note: Remains on PO ABX, no adverse reaction noted. VSS, afebrile.      Problem: Discharge Barriers/Planning  Goal: Patient's continuum of care needs will be met  Note: Pt is discharging to home tomorrow.

## 2021-03-10 NOTE — PROGRESS NOTES
Report received from SANTIAGO Allison at 0700. Assumed care, assessment complete. Pt is A & O x 4.  Pt denies having any pain at this time. Fall precautions and appropriate signs in place. Pt oriented to unit routine, call light/phone system and CNA and RN extension number provided. Pt educated regarding fall precautions. Bed locked in lowest position. Pt denies any additional needs at this time. Call light within reach.

## 2021-03-10 NOTE — DISCHARGE INSTRUCTIONS
Discharge Instructions    Discharged to home by car with escort. Discharged via wheelchair, hospital escort: Yes.  Special equipment needed: Not Applicable    Be sure to schedule a follow-up appointment with your primary care doctor or any specialists as instructed.     Discharge Plan:   Diet Plan: Discussed  Activity Level: Discussed  Confirmed Follow up Appointment: Patient to Call and Schedule Appointment  Confirmed Symptoms Management: Discussed  Medication Reconciliation Updated: Yes  Influenza Vaccine Indication: Not indicated: Previously immunized this influenza season and > 8 years of age    I understand that a diet low in cholesterol, fat, and sodium is recommended for good health. Unless I have been given specific instructions below for another diet, I accept this instruction as my diet prescription.   Other diet: Cardiac    Special Instructions: None    · Is patient discharged on Warfarin / Coumadin?   No     Depression / Suicide Risk    As [facility] are [facility], it is important to learn how to keep safe from harming yourself.    Recognize the warning signs:  · Abrupt changes in personality, positive or negative- including increase in energy   · Giving away possessions  · Change in eating patterns- significant weight changes-  positive or negative  · Change in sleeping patterns- unable to sleep or sleeping all the time   · Unwillingness or inability to communicate  · Depression  · Unusual sadness, discouragement and loneliness  · Talk of wanting to die  · Neglect of personal appearance   · Rebelliousness- reckless behavior  · Withdrawal from people/activities they love  · Confusion- inability to concentrate     If you or a loved one observes any of these behaviors or has concerns about self-harm, here's what you can do:  · Talk about it- your feelings and reasons for harming yourself  · Remove any means that you might use to hurt yourself (examples: pills, rope, extension  cords, firearm)  · Get professional help from the community (Mental Health, Substance Abuse, psychological counseling)  · Do not be alone:Call your Safe Contact- someone whom you trust who will be there for you.  · Call your local CRISIS HOTLINE 988-6649 or 439-850-3776  · Call your local Children's Mobile Crisis Response Team [place] (953) 153-9001 or www.[place]  · Call the toll free National Suicide Prevention Hotlines   · National Suicide Prevention Lifeline 516-479-TCWG (0748)  · RECESS. Hope Line Network 800-SUICIDE (548-0684)      COVID-19  COVID-19 is a respiratory infection that is caused by a virus called severe acute respiratory syndrome coronavirus 2 (SARS-CoV-2). The disease is also known as coronavirus disease or novel coronavirus. In some people, the virus may not cause any symptoms. In others, it may cause a serious infection. The infection can get worse quickly and can lead to complications, such as:  · Pneumonia, or infection of the lungs.  · Acute respiratory distress syndrome or ARDS. This is fluid build-up in the lungs.  · Acute respiratory failure. This is a condition in which there is not enough oxygen passing from the lungs to the body.  · Sepsis or septic shock. This is a serious bodily reaction to an infection.  · Blood clotting problems.  · Secondary infections due to bacteria or fungus.  The virus that causes COVID-19 is contagious. This means that it can spread from person to person through droplets from coughs and sneezes (respiratory secretions).  What are the causes?  This illness is caused by a virus. You may catch the virus by:  · Breathing in droplets from an infected person's cough or sneeze.  · Touching something, like a table or a doorknob, that was exposed to the virus (contaminated) and then touching your mouth, nose, or eyes.  What increases the risk?  Risk for infection  You are more likely to be infected with this virus if you:  · Live in or travel to an  area with a COVID-19 outbreak.  · Come in contact with a sick person who recently traveled to an area with a COVID-19 outbreak.  · Provide care for or live with a person who is infected with COVID-19.  Risk for serious illness  You are more likely to become seriously ill from the virus if you:  · Are 65 years of age or older.  · Have a long-term disease that lowers your body's ability to fight infection (immunocompromised).  · Live in a nursing home or long-term care facility.  · Have a long-term (chronic) disease such as:  ? Chronic lung disease, including chronic obstructive pulmonary disease or asthma  ? Heart disease.  ? Diabetes.  ? Chronic kidney disease.  ? Liver disease.  · Are obese.  What are the signs or symptoms?  Symptoms of this condition can range from mild to severe. Symptoms may appear any time from 2 to 14 days after being exposed to the virus. They include:  · A fever.  · A cough.  · Difficulty breathing.  · Chills.  · Muscle pains.  · A sore throat.  · Loss of taste or smell.  Some people may also have stomach problems, such as nausea, vomiting, or diarrhea.  Other people may not have any symptoms of COVID-19.  How is this diagnosed?  This condition may be diagnosed based on:  · Your signs and symptoms, especially if:  ? You live in an area with a COVID-19 outbreak.  ? You recently traveled to or from an area where the virus is common.  ? You provide care for or live with a person who was diagnosed with COVID-19.  · A physical exam.  · Lab tests, which may include:  ? A nasal swab to take a sample of fluid from your nose.  ? A throat swab to take a sample of fluid from your throat.  ? A sample of mucus from your lungs (sputum).  ? Blood tests.  · Imaging tests, which may include, X-rays, CT scan, or ultrasound.  How is this treated?  At present, there is no medicine to treat COVID-19. Medicines that treat other diseases are being used on a trial basis to see if they are effective against  COVID-19.  Your health care provider will talk with you about ways to treat your symptoms. For most people, the infection is mild and can be managed at home with rest, fluids, and over-the-counter medicines.  Treatment for a serious infection usually takes places in a hospital intensive care unit (ICU). It may include one or more of the following treatments. These treatments are given until your symptoms improve.  · Receiving fluids and medicines through an IV.  · Supplemental oxygen. Extra oxygen is given through a tube in the nose, a face mask, or a grimm.  · Positioning you to lie on your stomach (prone position). This makes it easier for oxygen to get into the lungs.  · Continuous positive airway pressure (CPAP) or bi-level positive airway pressure (BPAP) machine. This treatment uses mild air pressure to keep the airways open. A tube that is connected to a motor delivers oxygen to the body.  · Ventilator. This treatment moves air into and out of the lungs by using a tube that is placed in your windpipe.  · Tracheostomy. This is a procedure to create a hole in the neck so that a breathing tube can be inserted.  · Extracorporeal membrane oxygenation (ECMO). This procedure gives the lungs a chance to recover by taking over the functions of the heart and lungs. It supplies oxygen to the body and removes carbon dioxide.  Follow these instructions at home:  Lifestyle  · If you are sick, stay home except to get medical care. Your health care provider will tell you how long to stay home. Call your health care provider before you go for medical care.  · Rest at home as told by your health care provider.  · Do not use any products that contain nicotine or tobacco, such as cigarettes, e-cigarettes, and chewing tobacco. If you need help quitting, ask your health care provider.  · Return to your normal activities as told by your health care provider. Ask your health care provider what activities are safe for you.  General  instructions  · Take over-the-counter and prescription medicines only as told by your health care provider.  · Drink enough fluid to keep your urine pale yellow.  · Keep all follow-up visits as told by your health care provider. This is important.  How is this prevented?    There is no vaccine to help prevent COVID-19 infection. However, there are steps you can take to protect yourself and others from this virus.  To protect yourself:   · Do not travel to areas where COVID-19 is a risk. The areas where COVID-19 is reported change often. To identify high-risk areas and travel restrictions, check the CDC travel website: wwwnc.cdc.gov/travel/notices  · If you live in, or must travel to, an area where COVID-19 is a risk, take precautions to avoid infection.  ? Stay away from people who are sick.  ? Wash your hands often with soap and water for 20 seconds. If soap and water are not available, use an alcohol-based hand .  ? Avoid touching your mouth, face, eyes, or nose.  ? Avoid going out in public, follow guidance from your state and local health authorities.  ? If you must go out in public, wear a cloth face covering or face mask.  ? Disinfect objects and surfaces that are frequently touched every day. This may include:  § Counters and tables.  § Doorknobs and light switches.  § Sinks and faucets.  § Electronics, such as phones, remote controls, keyboards, computers, and tablets.  To protect others:  If you have symptoms of COVID-19, take steps to prevent the virus from spreading to others.  · If you think you have a COVID-19 infection, contact your health care provider right away. Tell your health care team that you think you may have a COVID-19 infection.  · Stay home. Leave your house only to seek medical care. Do not use public transport.  · Do not travel while you are sick.  · Wash your hands often with soap and water for 20 seconds. If soap and water are not available, use alcohol-based hand  .  · Stay away from other members of your household. Let healthy household members care for children and pets, if possible. If you have to care for children or pets, wash your hands often and wear a mask. If possible, stay in your own room, separate from others. Use a different bathroom.  · Make sure that all people in your household wash their hands well and often.  · Cough or sneeze into a tissue or your sleeve or elbow. Do not cough or sneeze into your hand or into the air.  · Wear a cloth face covering or face mask.  Where to find more information  · Centers for Disease Control and Prevention: www.cdc.gov/coronavirus/2019-ncov/index.html  · World Health Organization: www.who.int/health-topics/coronavirus  Contact a health care provider if:  · You live in or have traveled to an area where COVID-19 is a risk and you have symptoms of the infection.  · You have had contact with someone who has COVID-19 and you have symptoms of the infection.  Get help right away if:  · You have trouble breathing.  · You have pain or pressure in your chest.  · You have confusion.  · You have bluish lips and fingernails.  · You have difficulty waking from sleep.  · You have symptoms that get worse.  These symptoms may represent a serious problem that is an emergency. Do not wait to see if the symptoms will go away. Get medical help right away. Call your local emergency services (911 in the [place] Do not drive yourself to the hospital. Let the emergency medical personnel know if you think you have COVID-19.  Summary  · COVID-19 is a respiratory infection that is caused by a virus. It is also known as coronavirus disease or novel coronavirus. It can cause serious infections, such as pneumonia, acute respiratory distress syndrome, acute respiratory failure, or sepsis.  · The virus that causes COVID-19 is contagious. This means that it can spread from person to person through droplets from coughs and sneezes.  · You are more  likely to develop a serious illness if you are 65 years of age or older, have a weak immunity, live in a nursing home, or have chronic disease.  · There is no medicine to treat COVID-19. Your health care provider will talk with you about ways to treat your symptoms.  · Take steps to protect yourself and others from infection. Wash your hands often and disinfect objects and surfaces that are frequently touched every day. Stay away from people who are sick and wear a mask if you are sick.  This information is not intended to replace advice given to you by your health care provider. Make sure you discuss any questions you have with your health care provider.  Document Released: 01/23/2020 Document Revised: 05/14/2020 Document Reviewed: 01/23/2020  Elsevier Patient Education © 2020 [employer]      Bacteremia, Adult  Bacteremia is the presence of bacteria in the blood. When bacteria enter the bloodstream, they can cause a life-threatening reaction called sepsis, which is a medical emergency. Bacteremia can spread to other parts of the body, including the heart, joints, and brain.  What are the causes?  This condition is caused by bacteria that get into the blood.  · Bacteria can enter the blood:  ? From a skin infection or injury, such as a burn or a cut.  ? From a lung infection (pneumonia).  ? From an infection in your stomach or intestines (gastrointestinal infection).  ? From an infection in your bladder or urinary system (urinary tract infection).  ? During a dental or medical procedure.  ? From bleeding gums.  ? When a bacterial infection in another part of your body spreads to your blood.  ? Through an unclean (contaminated) needle.  What increases the risk?  This condition is more likely to develop in children, the elderly, and people who:  · Have a long-term (chronic) disease or condition like diabetes or chronic kidney failure.  · Have an artificial joint or heart valve.  · Have heart valve disease.  · Have  a tube inserted to treat a medical condition, such as a urinary catheter or IV.  · Have a weak disease-fighting system (immune system).  · Inject illegal drugs.  · Have been hospitalized for more than 10 days in a row.  What are the signs or symptoms?  Symptoms of this condition include:  · Fever.  · Chills.  · Fast heartbeat.  · Shortness of breath.  · Dizziness.  · Weakness.  · Confusion.  · Nausea or vomiting.  · Diarrhea.  · Low blood pressure.  · Decreased urine output.  Bacteremia that has spread to other parts of the body may cause symptoms in those areas. In some cases, there are no symptoms.  How is this diagnosed?  This condition may be diagnosed with a physical exam and tests, such as:  · A complete blood count (CBC). This test checks for signs of infection.  · Blood cultures. These check for bacteria in your blood.  · Tests of any tubes that you have had inserted. These tests check for a source of infection.  · Urine tests, including urine cultures. These check for bacteria in the urine that could be a source of infection.  · Imaging tests, such as an X-ray, CT scan, MRI, or heart ultrasound. These check for a source of infection in other parts of your body, such as your lungs, heart valves, or joints.  How is this treated?  This condition is usually treated in the hospital. Treatment may involve:  · Antibiotic medicines. These may be given by mouth (orally) or directly into your blood through an IV (infusion through your vein).  ? Depending on the source of infection, you may need antibiotics for several weeks.  ? At first, you may be given an antibiotic to kill most types of blood bacteria (broad-spectrum antibiotic). If your test results show that a certain kind of bacteria is causing the problem, you may be given a different antibiotic to kill that specific bacteria.  · IV fluids.  · Removing any catheter or device that could be a source of infection.  · Blood pressure and breathing support, if  needed.  · Surgery to control the source or the spread of infection, such as surgery to remove an infected device, abscess, or tissue.  · Having follow-up visits for medicines, blood tests, and further evaluation.  Follow these instructions at home:  Medicines  · Take over-the-counter and prescription medicines only as told by your health care provider.  · If you were prescribed an antibiotic medicine, take it as told by your health care provider. Do not stop taking the antibiotic even if you start to feel better.  General instructions    · Rest as needed. Ask your health care provider when you may return to normal activities.  · Drink enough fluid to keep your urine pale yellow.  · Do not use any products that contain nicotine or tobacco, such as cigarettes and e-cigarettes. If you need help quitting, ask your health care provider.  · Keep all follow-up visits as told by your health care provider. This is important.  How is this prevented?    · Wash your hands regularly with soap and water. If soap and water are not available, use hand .  · You should wash your hands:  ? After using the toilet or changing a diaper.  ? Before preparing, cooking, or serving food.  ? While caring for a sick person or while visiting someone in a hospital.  ? Before and after changing bandages (dressings) over wounds.  · Clean any scrapes or cuts with soap and water and cover them with clean dressings.  · Get vaccinations as recommended by your health care provider.  · Practice good oral hygiene. Brush your teeth two times a day, and floss regularly.  · Take good care of your skin. This includes bathing and moisturizing on a regular basis.  Get help right away if you have:  · Pain.  · A fever or chills.  · Trouble breathing.  · A fast heart rate.  · Skin that is blotchy, pale, or clammy.  · Confusion.  · Weakness.  · Lack of energy (lethargy) or unusual sleepiness.  · Diarrhea.  · New symptoms that develop after treatment has  started.  These symptoms may represent a serious problem that is an emergency. Do not wait to see if the symptoms will go away. Get medical help right away. Call your local emergency services (911 in the [place] Do not drive yourself to the hospital.  Summary  · Bacteremia is the presence of bacteria in the blood. When bacteria enter the bloodstream, they can cause a life-threatening reaction called sepsis.  · Some symptoms of bacteremia include fever, chills, shortness of breath, confusion, nausea or vomiting, and diarrhea.  · Tests may be done to find the source of infection that led to bacteremia. These tests may include blood tests, urine tests, and imaging tests.  · Bacteremia is usually treated with antibiotic medicines in the hospital.  · Get help right away if you have any new symptoms that develop after treatment has started.  This information is not intended to replace advice given to you by your health care provider. Make sure you discuss any questions you have with your health care provider.  Document Released: 10/01/2007 Document Revised: 04/29/2019 Document Reviewed: 04/29/2019  [email or web address] Patient Education © 2020 [employer]

## 2021-03-10 NOTE — PROGRESS NOTES
Hospital Medicine Daily Progress Note    Date of Service  3/9/2021    Chief Complaint  70 y.o. male admitted 3/6/2021 with worsening right gluteal abscess.    Hospital Course  70-year-old male with past medical history of diabetes, hypertension, CHF who presented 3/6/2021 for worsening right gluteal abscess.  Patient was seen in the ER 3/5 and abscess was I&D at that time.  Blood cultures from that visit are growing gram-positive cocci.  Patient was admitted, started on IV antibiotics and repeat cultures ordered.    Interval Problem Update  No acute events overnight.  Patient's vital signs are stable.  Echo performed yesterday showed EF 60% with indeterminate diastolic function and no valvular vegetations noted.  So far repeat blood cultures negative to date. Patient complaining of congestion and post nasal drip this morning will start mucinex otherwise no other complaints.     Consultants/Specialty  N/A     Code Status  Full Code    Disposition  To home in AM if infectious markers stable on recent change to oral ABX.      Review of Systems  Review of Systems   Constitutional: Negative for chills and fever.   HENT: Positive for congestion.    Respiratory: Positive for cough. Negative for shortness of breath.    Cardiovascular: Negative for chest pain.   Gastrointestinal: Negative for abdominal pain, nausea and vomiting.   Genitourinary: Negative for dysuria.   Musculoskeletal: Negative for myalgias.   Skin: Negative for rash.   Neurological: Negative for dizziness and headaches.   Psychiatric/Behavioral: Negative for depression.   All other systems reviewed and are negative.       Physical Exam  Temp:  [36.2 °C (97.1 °F)-36.6 °C (97.9 °F)] 36.2 °C (97.1 °F)  Pulse:  [58-63] 58  Resp:  [16-17] 16  BP: (100-124)/(64-72) 112/64  SpO2:  [94 %-96 %] 94 %    Physical Exam  Vitals and nursing note reviewed.   Constitutional:       General: He is not in acute distress.     Appearance: Normal appearance.   HENT:      Head:  Normocephalic and atraumatic.   Eyes:      Conjunctiva/sclera: Conjunctivae normal.      Pupils: Pupils are equal, round, and reactive to light.   Cardiovascular:      Rate and Rhythm: Normal rate and regular rhythm.      Pulses: Normal pulses.   Pulmonary:      Effort: Pulmonary effort is normal. No respiratory distress.      Breath sounds: Normal breath sounds. No wheezing.   Abdominal:      General: Abdomen is flat. There is no distension.      Palpations: Abdomen is soft.      Tenderness: There is no abdominal tenderness.   Musculoskeletal:         General: No swelling. Normal range of motion.      Cervical back: Normal range of motion and neck supple.   Skin:     General: Skin is warm and dry.      Comments: Seton loops intact to gluteal wound.   Neurological:      General: No focal deficit present.      Mental Status: He is alert and oriented to person, place, and time.      Cranial Nerves: No cranial nerve deficit.   Psychiatric:         Mood and Affect: Mood normal.         Behavior: Behavior normal.         Current Facility-Administered Medications:   •  [START ON 3/10/2021] simvastatin (ZOCOR) tablet 10 mg, 10 mg, Oral, Q EVENING, Jabari Bro M.D.  •  guaiFENesin ER (MUCINEX) tablet 600 mg, 600 mg, Oral, Q12HRS, Claudia Peng, D.O., 600 mg at 03/09/21 0511  •  sulfamethoxazole-trimethoprim (BACTRIM) 400-80 MG per tablet 1 tablet, 1 tablet, Oral, Q12HRS, Claudia Peng, D.O., 1 tablet at 03/09/21 0511  •  guaiFENesin dextromethorphan (ROBITUSSIN DM) 100-10 MG/5ML syrup 5 mL, 5 mL, Oral, Q6HRS PRN, Chon Greenfield M.D., 5 mL at 03/09/21 0219  •  ipratropium-albuterol (DUONEB) nebulizer solution, 3 mL, Nebulization, Q4H PRN (RT), Chon Greenfield M.D.  •  aspirin EC (ECOTRIN) tablet 81 mg, 81 mg, Oral, QAM, Danielle Araiza M.D., 81 mg at 03/09/21 0511  •  calcium carbonate (TUMS) chewable tab 1,000 mg, 1,000 mg, Oral, TID PRN, Danielle Araiza M.D.  •  cyanocobalamin (VITAMIN B-12) tablet 2,000 mcg, 2,000  "mcg, Oral, RENATO, Danielle Araiza M.D., 2,000 mcg at 03/09/21 0511  •  lisinopril (PRINIVIL) tablet 40 mg, 40 mg, Oral, QAM, Danielle Araiza M.D., 40 mg at 03/09/21 0511  •  metoprolol tartrate (LOPRESSOR) tablet 25 mg, 25 mg, Oral, BID, Danielle Araiza M.D., 25 mg at 03/09/21 0756  •  senna-docusate (PERICOLACE or SENOKOT S) 8.6-50 MG per tablet 2 tablet, 2 tablet, Oral, BID **AND** polyethylene glycol/lytes (MIRALAX) PACKET 1 Packet, 1 Packet, Oral, QDAY PRN **AND** magnesium hydroxide (MILK OF MAGNESIA) suspension 30 mL, 30 mL, Oral, QDAY PRN **AND** bisacodyl (DULCOLAX) suppository 10 mg, 10 mg, Rectal, QDAY PRN, Danielle Araiza M.D.  •  enoxaparin (LOVENOX) inj 40 mg, 40 mg, Subcutaneous, DAILY, Danielle Araiza M.D., 40 mg at 03/09/21 0511  •  acetaminophen (Tylenol) tablet 650 mg, 650 mg, Oral, Q6HRS PRN, Danielle Araiza M.D., 650 mg at 03/08/21 1747      Blood Culture   Date Value Ref Range Status   03/20/2016 No growth after 5 days of incubation.  Final     Blood Culture Hold   Date Value Ref Range Status   02/04/2014 Collected  Final          Results     Procedure Component Value Units Date/Time    BLOOD CULTURE [[id number]] Collected: 03/07/21 1310    Order Status: Completed Specimen: Blood from Peripheral Updated: 03/08/21 0919     Significant Indicator NEG     Source BLD     Site PERIPHERAL     Culture Result No Growth  Note: Blood cultures are incubated for 5 days and  are monitored continuously.Positive blood cultures  are called to the RN and reported as soon as  they are identified.      Narrative:      Per Hospital Policy: Only change Specimen Src: to \"Line\" if  specified by physician order.  Right Hand    BLOOD CULTURE [[id number]] Collected: 03/07/21 1318    Order Status: Completed Specimen: Blood from Peripheral Updated: 03/08/21 0919     Significant Indicator NEG     Source BLD     Site PERIPHERAL     Culture Result No Growth  Note: Blood cultures are incubated for 5 days and  are monitored continuously.Positive " "blood cultures  are called to the RN and reported as soon as  they are identified.      Narrative:      Per Hospital Policy: Only change Specimen Src: to \"Line\" if  specified by physician order.  Right Wrist    BLOOD CULTURE x2 [[id number]] Collected: 03/06/21 1242    Order Status: Completed Specimen: Blood from Peripheral Updated: 03/07/21 0839     Significant Indicator NEG     Source BLD     Site PERIPHERAL     Culture Result No Growth  Note: Blood cultures are incubated for 5 days and  are monitored continuously.Positive blood cultures  are called to the RN and reported as soon as  they are identified.      Narrative:      Per Hospital Policy: Only change Specimen Src: to \"Line\" if  specified by physician order.  Left AC    BLOOD CULTURE x2 [[id number]] Collected: 03/06/21 1205    Order Status: Completed Specimen: Blood from Peripheral Updated: 03/07/21 0839     Significant Indicator NEG     Source BLD     Site PERIPHERAL     Culture Result No Growth  Note: Blood cultures are incubated for 5 days and  are monitored continuously.Positive blood cultures  are called to the RN and reported as soon as  they are identified.      Narrative:      Per Hospital Policy: Only change Specimen Src: to \"Line\" if  specified by physician order.  No site indicated    SARS-CoV-2 PCR (24 hour In-House): Collect NP swab in VTM [[id number]] Collected: 03/06/21 1554    Order Status: Completed Specimen: Respirate Updated: 03/06/21 1951     SARS-CoV-2 Source NP Swab     SARS-CoV-2 by PCR NotDetected     Comment: PATIENTS: Important information regarding your results and instructions can  be found at [email or web address]   \"After your  Covid-19 Test\"  [facility] providers: PLEASE REFER TO DE-ESCALATION AND RETESTING PROTOCOL  on [email or web address]  **The TaqPath COVID-19 SARS-CoV-2 RT-PCR test has been made available for  use under the Emergency Use Authorization (EUA) only.         Narrative:      Have you been in " close contact with a person who is suspected  or known to be positive for COVID-19 within the last 30 days  (e.g. last seen that person < 30 days ago)->Unknown            Fluids    Intake/Output Summary (Last 24 hours) at 3/9/2021 1621  Last data filed at 3/9/2021 1400  Gross per 24 hour   Intake 600 ml   Output 1475 ml   Net -875 ml       Laboratory  Recent Labs     03/07/21  0406 03/08/21  0415   WBC 7.5 6.7   RBC 4.71 4.81   HEMOGLOBIN 14.1 14.3   HEMATOCRIT 43.0 43.8   MCV 91.3 91.1   MCH 29.9 29.7   MCHC 32.8* 32.6*   RDW 44.0 43.8   PLATELETCT 258 261   MPV 10.0 9.6     Recent Labs     03/07/21  0406 03/08/21  0415   SODIUM 131* 134*   POTASSIUM 3.9 4.1   CHLORIDE 100 99   CO2 22 23   GLUCOSE 103* 96   BUN 14 15   CREATININE 1.12 1.16   CALCIUM 9.1 9.1                   Imaging  EC-ECHOCARDIOGRAM COMPLETE W/O CONT   Final Result      CT-CTA CHEST PULMONARY ARTERY W/ RECONS   Final Result      No evidence of large central or lobar branch emboli. Segmental branch emboli difficult to exclude due to patient body habitus and poor contrast opacification of the pulmonary arteries.      Cardiomegaly with coronary artery calcifications.      Peripheral interstitial prominence lung bases suggesting interstitial lung disease            DX-CHEST-PORTABLE (1 VIEW)   Final Result      Stable chest without acute/new abnormality.           Assessment/Plan  * Bacteremia- (present on admission)  Assessment & Plan  -Blood cultures repeated, negative to date   -blood culture 3/5- staph species, final culture per EMR.    -Changed to oral bactrim, currently coag neg staph  -Echo EF 60%, no valvular vegetations noted     DM (diabetes mellitus) (HCC)- (present on admission)  Assessment & Plan  -Not on home meds  -Will monitor POCT glucose and cover as needed  -A1c 6.2    Hyponatremia  Assessment & Plan  Mild, continue to monitor     Essential hypertension- (present on admission)  Assessment & Plan  -Continue home meds       VTE  prophylaxis: Lovenox     I have performed a physical exam and reviewed and updated ROS and Plan today (3/9/2021). In review of yesterday's note (3/8/2021), there are no changes except as documented above.

## 2021-03-12 ENCOUNTER — PATIENT OUTREACH (OUTPATIENT)
Dept: HEALTH INFORMATION MANAGEMENT | Facility: OTHER | Age: 71
End: 2021-03-12

## 2021-03-12 NOTE — PROGRESS NOTES
Community Health Worker Vinson called the patient an attempt to introduce Community Care Management. Patient did not answer and has a voicemail box that has not been set up yet.     CHW will attempt again at a later date.

## 2021-03-16 SDOH — ECONOMIC STABILITY: FOOD INSECURITY: WITHIN THE PAST 12 MONTHS, YOU WORRIED THAT YOUR FOOD WOULD RUN OUT BEFORE YOU GOT MONEY TO BUY MORE.: NEVER TRUE

## 2021-03-16 SDOH — ECONOMIC STABILITY: FOOD INSECURITY: WITHIN THE PAST 12 MONTHS, THE FOOD YOU BOUGHT JUST DIDN'T LAST AND YOU DIDN'T HAVE MONEY TO GET MORE.: NEVER TRUE

## 2021-03-16 SDOH — ECONOMIC STABILITY: TRANSPORTATION INSECURITY
IN THE PAST 12 MONTHS, HAS LACK OF TRANSPORTATION KEPT YOU FROM MEETINGS, WORK, OR FROM GETTING THINGS NEEDED FOR DAILY LIVING?: NO

## 2021-03-16 SDOH — ECONOMIC STABILITY: TRANSPORTATION INSECURITY
IN THE PAST 12 MONTHS, HAS THE LACK OF TRANSPORTATION KEPT YOU FROM MEDICAL APPOINTMENTS OR FROM GETTING MEDICATIONS?: NO

## 2021-03-16 ASSESSMENT — SOCIAL DETERMINANTS OF HEALTH (SDOH): HOW HARD IS IT FOR YOU TO PAY FOR THE VERY BASICS LIKE FOOD, HOUSING, MEDICAL CARE, AND HEATING?: NOT VERY HARD
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
## 2021-03-29 ENCOUNTER — HOSPITAL ENCOUNTER (EMERGENCY)
Facility: MEDICAL CENTER | Age: 71
End: 2021-03-29
Attending: EMERGENCY MEDICINE
Payer: MEDICARE

## 2021-03-29 ENCOUNTER — APPOINTMENT (OUTPATIENT)
Dept: RADIOLOGY | Facility: MEDICAL CENTER | Age: 71
End: 2021-03-29
Attending: EMERGENCY MEDICINE
Payer: MEDICARE

## 2021-03-29 VITALS
TEMPERATURE: 97.9 F | HEART RATE: 81 BPM | SYSTOLIC BLOOD PRESSURE: 133 MMHG | RESPIRATION RATE: 18 BRPM | BODY MASS INDEX: 40.39 KG/M2 | HEIGHT: 66 IN | OXYGEN SATURATION: 96 % | WEIGHT: 251.32 LBS | DIASTOLIC BLOOD PRESSURE: 83 MMHG

## 2021-03-29 DIAGNOSIS — M79.605 PAIN IN BOTH LOWER EXTREMITIES: ICD-10-CM

## 2021-03-29 DIAGNOSIS — M79.604 PAIN IN BOTH LOWER EXTREMITIES: ICD-10-CM

## 2021-03-29 DIAGNOSIS — S43.004A SEPARATED SHOULDER, RIGHT, INITIAL ENCOUNTER: ICD-10-CM

## 2021-03-29 PROCEDURE — 73030 X-RAY EXAM OF SHOULDER: CPT | Mod: RT

## 2021-03-29 PROCEDURE — 99283 EMERGENCY DEPT VISIT LOW MDM: CPT | Mod: 25

## 2021-03-29 PROCEDURE — 71045 X-RAY EXAM CHEST 1 VIEW: CPT

## 2021-03-29 ASSESSMENT — FIBROSIS 4 INDEX: FIB4 SCORE: 1.61

## 2021-03-29 ASSESSMENT — PAIN DESCRIPTION - DESCRIPTORS: DESCRIPTORS: ACHING;CRAMPING

## 2021-03-29 NOTE — ED TRIAGE NOTES
"Chief Complaint   Patient presents with   • Shoulder Pain     PT reports falling against a door 2 days ago and now with pain in his right shoulder.  + CMS      Blood Pressure 133/83   Pulse 81   Temperature 36.6 °C (97.9 °F) (Temporal)   Respiration 18   Height 1.676 m (5' 6\")   Weight 114 kg (251 lb 5.2 oz)   Oxygen Saturation 96%   Body Mass Index 40.56 kg/m²     "
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
## 2021-03-29 NOTE — ED NOTES
Pt states he slipped and fell against the door two days ago, pt explains he hit the top of his shoulder, pt states he feels like he dislocated his shoulder and heard a crunching sound

## 2021-03-29 NOTE — ED NOTES
Break RN: Pt A&Ox4, given discharge instructions. Discussed follow-up, diet, activity, symptoms and management. Pt educated on returning to ED for any worsening of symptoms. Pt verbalized understanding of all discharge instructions. All questions answered. Pt ambulated out of ER with cane from traction, all belongings accounted for.

## 2021-05-05 PROCEDURE — 96365 THER/PROPH/DIAG IV INF INIT: CPT | Mod: XU

## 2021-05-05 PROCEDURE — 96375 TX/PRO/DX INJ NEW DRUG ADDON: CPT | Mod: XU

## 2021-05-05 PROCEDURE — 10160 PNXR ASPIR ABSC HMTMA BULLA: CPT

## 2021-05-05 PROCEDURE — 99285 EMERGENCY DEPT VISIT HI MDM: CPT

## 2021-05-06 ENCOUNTER — APPOINTMENT (OUTPATIENT)
Dept: RADIOLOGY | Facility: MEDICAL CENTER | Age: 71
DRG: 564 | End: 2021-05-06
Attending: STUDENT IN AN ORGANIZED HEALTH CARE EDUCATION/TRAINING PROGRAM
Payer: MEDICARE

## 2021-05-06 ENCOUNTER — APPOINTMENT (OUTPATIENT)
Dept: RADIOLOGY | Facility: MEDICAL CENTER | Age: 71
DRG: 564 | End: 2021-05-06
Attending: EMERGENCY MEDICINE
Payer: MEDICARE

## 2021-05-06 ENCOUNTER — HOSPITAL ENCOUNTER (INPATIENT)
Facility: MEDICAL CENTER | Age: 71
LOS: 4 days | DRG: 564 | End: 2021-05-10
Attending: EMERGENCY MEDICINE | Admitting: STUDENT IN AN ORGANIZED HEALTH CARE EDUCATION/TRAINING PROGRAM
Payer: MEDICARE

## 2021-05-06 DIAGNOSIS — T87.44 INFECTION OF AMPUTATION STUMP OF LEFT LOWER EXTREMITY (HCC): ICD-10-CM

## 2021-05-06 DIAGNOSIS — N17.9 AKI (ACUTE KIDNEY INJURY) (HCC): ICD-10-CM

## 2021-05-06 DIAGNOSIS — L03.119 CELLULITIS OF LOWER EXTREMITY, UNSPECIFIED LATERALITY: ICD-10-CM

## 2021-05-06 DIAGNOSIS — Z89.612 HISTORY OF ABOVE-KNEE AMPUTATION OF LEFT LOWER EXTREMITY (HCC): ICD-10-CM

## 2021-05-06 DIAGNOSIS — A41.9 SEPSIS, DUE TO UNSPECIFIED ORGANISM, UNSPECIFIED WHETHER ACUTE ORGAN DYSFUNCTION PRESENT (HCC): ICD-10-CM

## 2021-05-06 DIAGNOSIS — L03.115 CELLULITIS OF RIGHT LOWER EXTREMITY: ICD-10-CM

## 2021-05-06 PROBLEM — I25.10 CAD (CORONARY ARTERY DISEASE): Status: ACTIVE | Noted: 2021-05-06

## 2021-05-06 PROBLEM — N18.30 CKD (CHRONIC KIDNEY DISEASE), STAGE III: Status: ACTIVE | Noted: 2021-05-06

## 2021-05-06 PROBLEM — I73.9 PAD (PERIPHERAL ARTERY DISEASE) (HCC): Status: ACTIVE | Noted: 2021-05-06

## 2021-05-06 PROBLEM — G47.33 OSA (OBSTRUCTIVE SLEEP APNEA): Status: ACTIVE | Noted: 2021-05-06

## 2021-05-06 PROBLEM — G47.00 INSOMNIA: Status: ACTIVE | Noted: 2021-05-06

## 2021-05-06 PROBLEM — E27.40 ADRENAL INSUFFICIENCY (HCC): Status: ACTIVE | Noted: 2021-05-06

## 2021-05-06 LAB
ALBUMIN SERPL BCP-MCNC: 3.4 G/DL (ref 3.2–4.9)
ALBUMIN/GLOB SERPL: 0.8 G/DL
ALP SERPL-CCNC: 97 U/L (ref 30–99)
ALT SERPL-CCNC: 23 U/L (ref 2–50)
ANION GAP SERPL CALC-SCNC: 11 MMOL/L (ref 7–16)
APPEARANCE FLD: NORMAL
APPEARANCE UR: CLEAR
APPEARANCE UR: CLEAR
AST SERPL-CCNC: 18 U/L (ref 12–45)
BACTERIA #/AREA URNS HPF: NEGATIVE /HPF
BACTERIA #/AREA URNS HPF: NEGATIVE /HPF
BASE EXCESS BLDA CALC-SCNC: 1 MMOL/L (ref -4–3)
BASOPHILS # BLD AUTO: 0.5 % (ref 0–1.8)
BASOPHILS # BLD: 0.12 K/UL (ref 0–0.12)
BILIRUB SERPL-MCNC: 0.3 MG/DL (ref 0.1–1.5)
BILIRUB UR QL STRIP.AUTO: ABNORMAL
BILIRUB UR QL STRIP.AUTO: NEGATIVE
BLOOD CULTURE HOLD CXBCH: NORMAL
BODY FLD TYPE: NORMAL
BODY TEMPERATURE: ABNORMAL DEGREES
BUN SERPL-MCNC: 16 MG/DL (ref 8–22)
CALCIUM SERPL-MCNC: 9.3 MG/DL (ref 8.5–10.5)
CHLORIDE SERPL-SCNC: 104 MMOL/L (ref 96–112)
CO2 BLDA-SCNC: 27 MMOL/L (ref 20–33)
CO2 SERPL-SCNC: 21 MMOL/L (ref 20–33)
COLOR FLD: NORMAL
COLOR UR: ABNORMAL
COLOR UR: YELLOW
CORTIS SERPL-MCNC: 8.2 UG/DL (ref 0–23)
CREAT SERPL-MCNC: 1.3 MG/DL (ref 0.5–1.4)
CRP SERPL HS-MCNC: 18.73 MG/DL (ref 0–0.75)
CSF COMMENTS 1658: NORMAL
DELSYS IDSYS: ABNORMAL
EOSINOPHIL # BLD AUTO: 0.07 K/UL (ref 0–0.51)
EOSINOPHIL NFR BLD: 0.3 % (ref 0–6.9)
EPI CELLS #/AREA URNS HPF: ABNORMAL /HPF
EPI CELLS #/AREA URNS HPF: NEGATIVE /HPF
ERYTHROCYTE [DISTWIDTH] IN BLOOD BY AUTOMATED COUNT: 48.6 FL (ref 35.9–50)
ERYTHROCYTE [SEDIMENTATION RATE] IN BLOOD BY WESTERGREN METHOD: 55 MM/HOUR (ref 0–20)
GLOBULIN SER CALC-MCNC: 4.4 G/DL (ref 1.9–3.5)
GLUCOSE BLD-MCNC: 110 MG/DL (ref 65–99)
GLUCOSE BLD-MCNC: 111 MG/DL (ref 65–99)
GLUCOSE BLD-MCNC: 122 MG/DL (ref 65–99)
GLUCOSE BLD-MCNC: 123 MG/DL (ref 65–99)
GLUCOSE BLD-MCNC: 93 MG/DL (ref 65–99)
GLUCOSE SERPL-MCNC: 124 MG/DL (ref 65–99)
GLUCOSE UR STRIP.AUTO-MCNC: NEGATIVE MG/DL
GLUCOSE UR STRIP.AUTO-MCNC: NEGATIVE MG/DL
GRAM STN SPEC: NORMAL
HCO3 BLDA-SCNC: 26 MMOL/L (ref 17–25)
HCT VFR BLD AUTO: 44 % (ref 42–52)
HGB BLD-MCNC: 14.2 G/DL (ref 14–18)
HOROWITZ INDEX BLDA+IHG-RTO: 468 MM[HG]
HYALINE CASTS #/AREA URNS LPF: ABNORMAL /LPF
IMM GRANULOCYTES # BLD AUTO: 0.21 K/UL (ref 0–0.11)
IMM GRANULOCYTES NFR BLD AUTO: 0.9 % (ref 0–0.9)
INR PPP: 1.13 (ref 0.87–1.13)
KETONES UR STRIP.AUTO-MCNC: NEGATIVE MG/DL
KETONES UR STRIP.AUTO-MCNC: NEGATIVE MG/DL
LACTATE BLD-SCNC: 1.4 MMOL/L (ref 0.5–2)
LACTATE BLD-SCNC: 1.5 MMOL/L (ref 0.5–2)
LACTATE BLD-SCNC: 1.5 MMOL/L (ref 0.5–2)
LACTATE BLD-SCNC: 1.6 MMOL/L (ref 0.5–2)
LDH SERPL L TO P-CCNC: 242 U/L (ref 107–266)
LEUKOCYTE ESTERASE UR QL STRIP.AUTO: NEGATIVE
LEUKOCYTE ESTERASE UR QL STRIP.AUTO: NEGATIVE
LPM ILPM: 2 LPM
LYMPHOCYTES # BLD AUTO: 2.08 K/UL (ref 1–4.8)
LYMPHOCYTES NFR BLD: 9.1 % (ref 22–41)
LYMPHOCYTES NFR FLD: 17 %
MCH RBC QN AUTO: 29.9 PG (ref 27–33)
MCHC RBC AUTO-ENTMCNC: 32.3 G/DL (ref 33.7–35.3)
MCV RBC AUTO: 92.6 FL (ref 81.4–97.8)
MICRO URNS: ABNORMAL
MICRO URNS: ABNORMAL
MONOCYTES # BLD AUTO: 0.97 K/UL (ref 0–0.85)
MONOCYTES NFR BLD AUTO: 4.3 % (ref 0–13.4)
MONONUC CELLS NFR FLD: 1 %
MRSA DNA SPEC QL NAA+PROBE: NORMAL
NEUTROPHILS # BLD AUTO: 19.29 K/UL (ref 1.82–7.42)
NEUTROPHILS NFR BLD: 84.9 % (ref 44–72)
NEUTROPHILS NFR FLD: 82 %
NITRITE UR QL STRIP.AUTO: NEGATIVE
NITRITE UR QL STRIP.AUTO: NEGATIVE
NRBC # BLD AUTO: 0 K/UL
NRBC BLD-RTO: 0 /100 WBC
O2/TOTAL GAS SETTING VFR VENT: 28 %
PCO2 BLDA: 43.5 MMHG (ref 26–37)
PCO2 TEMP ADJ BLDA: 43.3 MMHG (ref 26–37)
PH BLDA: 7.38 [PH] (ref 7.4–7.5)
PH TEMP ADJ BLDA: 7.39 [PH] (ref 7.4–7.5)
PH UR STRIP.AUTO: 6.5 [PH] (ref 5–8)
PH UR STRIP.AUTO: 7 [PH] (ref 5–8)
PLATELET # BLD AUTO: 287 K/UL (ref 164–446)
PMV BLD AUTO: 10 FL (ref 9–12.9)
PO2 BLDA: 131 MMHG (ref 64–87)
PO2 TEMP ADJ BLDA: 131 MMHG (ref 64–87)
POTASSIUM SERPL-SCNC: 3.9 MMOL/L (ref 3.6–5.5)
PROT SERPL-MCNC: 7.8 G/DL (ref 6–8.2)
PROT UR QL STRIP: 30 MG/DL
PROT UR QL STRIP: NEGATIVE MG/DL
PROTHROMBIN TIME: 14.9 SEC (ref 12–14.6)
RBC # BLD AUTO: 4.75 M/UL (ref 4.7–6.1)
RBC # FLD: NORMAL CELLS/UL
RBC # URNS HPF: ABNORMAL /HPF
RBC # URNS HPF: ABNORMAL /HPF
RBC UR QL AUTO: ABNORMAL
RBC UR QL AUTO: NEGATIVE
SAO2 % BLDA: 99 % (ref 93–99)
SARS-COV-2 RNA RESP QL NAA+PROBE: NOTDETECTED
SIGNIFICANT IND 70042: NORMAL
SIGNIFICANT IND 70042: NORMAL
SITE SITE: NORMAL
SITE SITE: NORMAL
SODIUM SERPL-SCNC: 136 MMOL/L (ref 135–145)
SOURCE SOURCE: NORMAL
SOURCE SOURCE: NORMAL
SP GR UR STRIP.AUTO: 1
SP GR UR STRIP.AUTO: 1.03
SPECIMEN DRAWN FROM PATIENT: ABNORMAL
SPECIMEN SOURCE: NORMAL
UROBILINOGEN UR STRIP.AUTO-MCNC: 0.2 MG/DL
UROBILINOGEN UR STRIP.AUTO-MCNC: 1 MG/DL
WBC # BLD AUTO: 22.7 K/UL (ref 4.8–10.8)
WBC # FLD: NORMAL CELLS/UL
WBC #/AREA URNS HPF: ABNORMAL /HPF
WBC #/AREA URNS HPF: ABNORMAL /HPF

## 2021-05-06 PROCEDURE — 82533 TOTAL CORTISOL: CPT

## 2021-05-06 PROCEDURE — 700102 HCHG RX REV CODE 250 W/ 637 OVERRIDE(OP): Performed by: STUDENT IN AN ORGANIZED HEALTH CARE EDUCATION/TRAINING PROGRAM

## 2021-05-06 PROCEDURE — 87077 CULTURE AEROBIC IDENTIFY: CPT | Mod: 91

## 2021-05-06 PROCEDURE — 85610 PROTHROMBIN TIME: CPT

## 2021-05-06 PROCEDURE — 99223 1ST HOSP IP/OBS HIGH 75: CPT | Mod: AI,25 | Performed by: STUDENT IN AN ORGANIZED HEALTH CARE EDUCATION/TRAINING PROGRAM

## 2021-05-06 PROCEDURE — 700102 HCHG RX REV CODE 250 W/ 637 OVERRIDE(OP): Performed by: EMERGENCY MEDICINE

## 2021-05-06 PROCEDURE — 700111 HCHG RX REV CODE 636 W/ 250 OVERRIDE (IP): Performed by: EMERGENCY MEDICINE

## 2021-05-06 PROCEDURE — 84300 ASSAY OF URINE SODIUM: CPT

## 2021-05-06 PROCEDURE — 85652 RBC SED RATE AUTOMATED: CPT

## 2021-05-06 PROCEDURE — 96365 THER/PROPH/DIAG IV INF INIT: CPT | Mod: XU

## 2021-05-06 PROCEDURE — 87070 CULTURE OTHR SPECIMN AEROBIC: CPT | Mod: XU

## 2021-05-06 PROCEDURE — 99291 CRITICAL CARE FIRST HOUR: CPT | Performed by: STUDENT IN AN ORGANIZED HEALTH CARE EDUCATION/TRAINING PROGRAM

## 2021-05-06 PROCEDURE — 87186 SC STD MICRODIL/AGAR DIL: CPT

## 2021-05-06 PROCEDURE — 83605 ASSAY OF LACTIC ACID: CPT

## 2021-05-06 PROCEDURE — 80053 COMPREHEN METABOLIC PANEL: CPT

## 2021-05-06 PROCEDURE — U0005 INFEC AGEN DETEC AMPLI PROBE: HCPCS

## 2021-05-06 PROCEDURE — 36556 INSERT NON-TUNNEL CV CATH: CPT | Mod: RT | Performed by: STUDENT IN AN ORGANIZED HEALTH CARE EDUCATION/TRAINING PROGRAM

## 2021-05-06 PROCEDURE — 700105 HCHG RX REV CODE 258: Performed by: EMERGENCY MEDICINE

## 2021-05-06 PROCEDURE — 700105 HCHG RX REV CODE 258: Performed by: STUDENT IN AN ORGANIZED HEALTH CARE EDUCATION/TRAINING PROGRAM

## 2021-05-06 PROCEDURE — 86140 C-REACTIVE PROTEIN: CPT

## 2021-05-06 PROCEDURE — A9270 NON-COVERED ITEM OR SERVICE: HCPCS | Performed by: STUDENT IN AN ORGANIZED HEALTH CARE EDUCATION/TRAINING PROGRAM

## 2021-05-06 PROCEDURE — 36556 INSERT NON-TUNNEL CV CATH: CPT

## 2021-05-06 PROCEDURE — 93971 EXTREMITY STUDY: CPT | Mod: RT

## 2021-05-06 PROCEDURE — 99221 1ST HOSP IP/OBS SF/LOW 40: CPT | Performed by: NURSE PRACTITIONER

## 2021-05-06 PROCEDURE — 96375 TX/PRO/DX INJ NEW DRUG ADDON: CPT | Mod: XU

## 2021-05-06 PROCEDURE — 87641 MR-STAPH DNA AMP PROBE: CPT

## 2021-05-06 PROCEDURE — 83615 LACTATE (LD) (LDH) ENZYME: CPT

## 2021-05-06 PROCEDURE — 700101 HCHG RX REV CODE 250: Performed by: STUDENT IN AN ORGANIZED HEALTH CARE EDUCATION/TRAINING PROGRAM

## 2021-05-06 PROCEDURE — 82962 GLUCOSE BLOOD TEST: CPT

## 2021-05-06 PROCEDURE — 82570 ASSAY OF URINE CREATININE: CPT

## 2021-05-06 PROCEDURE — 87040 BLOOD CULTURE FOR BACTERIA: CPT | Mod: 91,XU

## 2021-05-06 PROCEDURE — 81001 URINALYSIS AUTO W/SCOPE: CPT

## 2021-05-06 PROCEDURE — 71045 X-RAY EXAM CHEST 1 VIEW: CPT

## 2021-05-06 PROCEDURE — 89051 BODY FLUID CELL COUNT: CPT

## 2021-05-06 PROCEDURE — U0003 INFECTIOUS AGENT DETECTION BY NUCLEIC ACID (DNA OR RNA); SEVERE ACUTE RESPIRATORY SYNDROME CORONAVIRUS 2 (SARS-COV-2) (CORONAVIRUS DISEASE [COVID-19]), AMPLIFIED PROBE TECHNIQUE, MAKING USE OF HIGH THROUGHPUT TECHNOLOGIES AS DESCRIBED BY CMS-2020-01-R: HCPCS

## 2021-05-06 PROCEDURE — 99291 CRITICAL CARE FIRST HOUR: CPT | Mod: 25 | Performed by: STUDENT IN AN ORGANIZED HEALTH CARE EDUCATION/TRAINING PROGRAM

## 2021-05-06 PROCEDURE — 700111 HCHG RX REV CODE 636 W/ 250 OVERRIDE (IP): Performed by: STUDENT IN AN ORGANIZED HEALTH CARE EDUCATION/TRAINING PROGRAM

## 2021-05-06 PROCEDURE — 82803 BLOOD GASES ANY COMBINATION: CPT

## 2021-05-06 PROCEDURE — 87205 SMEAR GRAM STAIN: CPT

## 2021-05-06 PROCEDURE — 85025 COMPLETE CBC W/AUTO DIFF WBC: CPT

## 2021-05-06 PROCEDURE — 770022 HCHG ROOM/CARE - ICU (200)

## 2021-05-06 PROCEDURE — 0H9LXZX DRAINAGE OF LEFT LOWER LEG SKIN, EXTERNAL APPROACH, DIAGNOSTIC: ICD-10-PCS | Performed by: EMERGENCY MEDICINE

## 2021-05-06 PROCEDURE — 36600 WITHDRAWAL OF ARTERIAL BLOOD: CPT

## 2021-05-06 PROCEDURE — A9270 NON-COVERED ITEM OR SERVICE: HCPCS | Performed by: EMERGENCY MEDICINE

## 2021-05-06 PROCEDURE — 02HV33Z INSERTION OF INFUSION DEVICE INTO SUPERIOR VENA CAVA, PERCUTANEOUS APPROACH: ICD-10-PCS | Performed by: STUDENT IN AN ORGANIZED HEALTH CARE EDUCATION/TRAINING PROGRAM

## 2021-05-06 RX ORDER — ZOLPIDEM TARTRATE 5 MG/1
5 TABLET ORAL NIGHTLY PRN
Status: DISCONTINUED | OUTPATIENT
Start: 2021-05-06 | End: 2021-05-06

## 2021-05-06 RX ORDER — SODIUM CHLORIDE 9 MG/ML
INJECTION, SOLUTION INTRAVENOUS
Status: COMPLETED | OUTPATIENT
Start: 2021-05-06 | End: 2021-05-06

## 2021-05-06 RX ORDER — OXYCODONE HYDROCHLORIDE 5 MG/1
5 TABLET ORAL EVERY 4 HOURS PRN
Status: DISCONTINUED | OUTPATIENT
Start: 2021-05-06 | End: 2021-05-06

## 2021-05-06 RX ORDER — SODIUM CHLORIDE, SODIUM LACTATE, POTASSIUM CHLORIDE, AND CALCIUM CHLORIDE .6; .31; .03; .02 G/100ML; G/100ML; G/100ML; G/100ML
500 INJECTION, SOLUTION INTRAVENOUS
Status: DISCONTINUED | OUTPATIENT
Start: 2021-05-06 | End: 2021-05-07

## 2021-05-06 RX ORDER — MORPHINE SULFATE 4 MG/ML
4 INJECTION, SOLUTION INTRAMUSCULAR; INTRAVENOUS ONCE
Status: COMPLETED | OUTPATIENT
Start: 2021-05-06 | End: 2021-05-06

## 2021-05-06 RX ORDER — ATORVASTATIN CALCIUM 40 MG/1
40 TABLET, FILM COATED ORAL EVERY EVENING
Status: DISCONTINUED | OUTPATIENT
Start: 2021-05-06 | End: 2021-05-10 | Stop reason: HOSPADM

## 2021-05-06 RX ORDER — SIMVASTATIN 10 MG
10 TABLET ORAL NIGHTLY
Status: DISCONTINUED | OUTPATIENT
Start: 2021-05-06 | End: 2021-05-06

## 2021-05-06 RX ORDER — CALCIUM CARBONATE 500 MG/1
1000 TABLET, CHEWABLE ORAL 3 TIMES DAILY PRN
Status: DISCONTINUED | OUTPATIENT
Start: 2021-05-06 | End: 2021-05-10 | Stop reason: HOSPADM

## 2021-05-06 RX ORDER — IBUPROFEN 600 MG/1
600 TABLET ORAL ONCE
Status: COMPLETED | OUTPATIENT
Start: 2021-05-06 | End: 2021-05-06

## 2021-05-06 RX ORDER — PREGABALIN 25 MG/1
25 CAPSULE ORAL ONCE
Status: COMPLETED | OUTPATIENT
Start: 2021-05-06 | End: 2021-05-06

## 2021-05-06 RX ORDER — NOREPINEPHRINE BITARTRATE 0.03 MG/ML
0-30 INJECTION, SOLUTION INTRAVENOUS CONTINUOUS
Status: DISCONTINUED | OUTPATIENT
Start: 2021-05-06 | End: 2021-05-07

## 2021-05-06 RX ORDER — BISACODYL 10 MG
10 SUPPOSITORY, RECTAL RECTAL
Status: DISCONTINUED | OUTPATIENT
Start: 2021-05-06 | End: 2021-05-10 | Stop reason: HOSPADM

## 2021-05-06 RX ORDER — OXYCODONE HYDROCHLORIDE 10 MG/1
10 TABLET ORAL
Status: DISCONTINUED | OUTPATIENT
Start: 2021-05-06 | End: 2021-05-06

## 2021-05-06 RX ORDER — MORPHINE SULFATE 4 MG/ML
4 INJECTION, SOLUTION INTRAMUSCULAR; INTRAVENOUS
Status: DISCONTINUED | OUTPATIENT
Start: 2021-05-06 | End: 2021-05-06

## 2021-05-06 RX ORDER — LABETALOL HYDROCHLORIDE 5 MG/ML
10 INJECTION, SOLUTION INTRAVENOUS EVERY 4 HOURS PRN
Status: DISCONTINUED | OUTPATIENT
Start: 2021-05-06 | End: 2021-05-09

## 2021-05-06 RX ORDER — HEPARIN SODIUM 5000 [USP'U]/ML
5000 INJECTION, SOLUTION INTRAVENOUS; SUBCUTANEOUS EVERY 8 HOURS
Status: DISCONTINUED | OUTPATIENT
Start: 2021-05-06 | End: 2021-05-07

## 2021-05-06 RX ORDER — GABAPENTIN 300 MG/1
300 CAPSULE ORAL 3 TIMES DAILY
Status: DISCONTINUED | OUTPATIENT
Start: 2021-05-06 | End: 2021-05-10 | Stop reason: HOSPADM

## 2021-05-06 RX ORDER — DEXTROSE MONOHYDRATE 25 G/50ML
50 INJECTION, SOLUTION INTRAVENOUS
Status: DISCONTINUED | OUTPATIENT
Start: 2021-05-06 | End: 2021-05-10 | Stop reason: HOSPADM

## 2021-05-06 RX ORDER — LORAZEPAM 2 MG/ML
1 INJECTION INTRAMUSCULAR ONCE
Status: COMPLETED | OUTPATIENT
Start: 2021-05-06 | End: 2021-05-06

## 2021-05-06 RX ORDER — POLYETHYLENE GLYCOL 3350 17 G/17G
1 POWDER, FOR SOLUTION ORAL
Status: DISCONTINUED | OUTPATIENT
Start: 2021-05-06 | End: 2021-05-10 | Stop reason: HOSPADM

## 2021-05-06 RX ORDER — ONDANSETRON 2 MG/ML
4 INJECTION INTRAMUSCULAR; INTRAVENOUS EVERY 4 HOURS PRN
Status: DISCONTINUED | OUTPATIENT
Start: 2021-05-06 | End: 2021-05-10 | Stop reason: HOSPADM

## 2021-05-06 RX ORDER — ONDANSETRON 4 MG/1
4 TABLET, ORALLY DISINTEGRATING ORAL EVERY 4 HOURS PRN
Status: DISCONTINUED | OUTPATIENT
Start: 2021-05-06 | End: 2021-05-10 | Stop reason: HOSPADM

## 2021-05-06 RX ORDER — METHOCARBAMOL 750 MG/1
750 TABLET, FILM COATED ORAL 3 TIMES DAILY PRN
Status: DISCONTINUED | OUTPATIENT
Start: 2021-05-06 | End: 2021-05-08

## 2021-05-06 RX ORDER — SODIUM CHLORIDE 9 MG/ML
2000 INJECTION, SOLUTION INTRAVENOUS ONCE
Status: COMPLETED | OUTPATIENT
Start: 2021-05-06 | End: 2021-05-06

## 2021-05-06 RX ORDER — MORPHINE SULFATE 4 MG/ML
2 INJECTION, SOLUTION INTRAMUSCULAR; INTRAVENOUS EVERY 4 HOURS PRN
Status: DISCONTINUED | OUTPATIENT
Start: 2021-05-06 | End: 2021-05-06

## 2021-05-06 RX ORDER — ACETAMINOPHEN 325 MG/1
650 TABLET ORAL EVERY 6 HOURS PRN
Status: DISCONTINUED | OUTPATIENT
Start: 2021-05-06 | End: 2021-05-10 | Stop reason: HOSPADM

## 2021-05-06 RX ORDER — PETROLATUM 42 G/100G
OINTMENT TOPICAL ONCE
Status: COMPLETED | OUTPATIENT
Start: 2021-05-06 | End: 2021-05-06

## 2021-05-06 RX ORDER — AMOXICILLIN 250 MG
2 CAPSULE ORAL 2 TIMES DAILY
Status: DISCONTINUED | OUTPATIENT
Start: 2021-05-06 | End: 2021-05-10 | Stop reason: HOSPADM

## 2021-05-06 RX ORDER — GUAIFENESIN/DEXTROMETHORPHAN 100-10MG/5
10 SYRUP ORAL EVERY 6 HOURS PRN
Status: DISCONTINUED | OUTPATIENT
Start: 2021-05-06 | End: 2021-05-10 | Stop reason: HOSPADM

## 2021-05-06 RX ORDER — OXYCODONE HYDROCHLORIDE 5 MG/1
5 TABLET ORAL
Status: DISCONTINUED | OUTPATIENT
Start: 2021-05-06 | End: 2021-05-06

## 2021-05-06 RX ORDER — SODIUM CHLORIDE, SODIUM LACTATE, POTASSIUM CHLORIDE, CALCIUM CHLORIDE 600; 310; 30; 20 MG/100ML; MG/100ML; MG/100ML; MG/100ML
2000 INJECTION, SOLUTION INTRAVENOUS CONTINUOUS
Status: DISCONTINUED | OUTPATIENT
Start: 2021-05-06 | End: 2021-05-07

## 2021-05-06 RX ORDER — SODIUM CHLORIDE 9 MG/ML
1000 INJECTION, SOLUTION INTRAVENOUS ONCE
Status: COMPLETED | OUTPATIENT
Start: 2021-05-06 | End: 2021-05-06

## 2021-05-06 RX ORDER — DIAZEPAM 5 MG/1
5 TABLET ORAL ONCE
Status: COMPLETED | OUTPATIENT
Start: 2021-05-06 | End: 2021-05-06

## 2021-05-06 RX ADMIN — HEPARIN SODIUM 5000 UNITS: 5000 INJECTION, SOLUTION INTRAVENOUS; SUBCUTANEOUS at 21:25

## 2021-05-06 RX ADMIN — HYDROCORTISONE SODIUM SUCCINATE 50 MG: 100 INJECTION, POWDER, FOR SOLUTION INTRAMUSCULAR; INTRAVENOUS at 23:43

## 2021-05-06 RX ADMIN — AMPICILLIN SODIUM AND SULBACTAM SODIUM 3 G: 2; 1 INJECTION, POWDER, FOR SOLUTION INTRAMUSCULAR; INTRAVENOUS at 23:41

## 2021-05-06 RX ADMIN — AMPICILLIN SODIUM AND SULBACTAM SODIUM 3 G: 2; 1 INJECTION, POWDER, FOR SOLUTION INTRAMUSCULAR; INTRAVENOUS at 10:10

## 2021-05-06 RX ADMIN — SODIUM CHLORIDE, POTASSIUM CHLORIDE, SODIUM LACTATE AND CALCIUM CHLORIDE 2000 ML: 600; 310; 30; 20 INJECTION, SOLUTION INTRAVENOUS at 17:00

## 2021-05-06 RX ADMIN — MORPHINE SULFATE 4 MG: 4 INJECTION INTRAVENOUS at 03:10

## 2021-05-06 RX ADMIN — Medication: at 14:40

## 2021-05-06 RX ADMIN — ASPIRIN 81 MG: 81 TABLET, COATED ORAL at 06:00

## 2021-05-06 RX ADMIN — METHOCARBAMOL 750 MG: 750 TABLET ORAL at 04:14

## 2021-05-06 RX ADMIN — METHOCARBAMOL 750 MG: 750 TABLET ORAL at 12:43

## 2021-05-06 RX ADMIN — ACETAMINOPHEN 650 MG: 325 TABLET, FILM COATED ORAL at 12:43

## 2021-05-06 RX ADMIN — IBUPROFEN 600 MG: 600 TABLET, FILM COATED ORAL at 14:39

## 2021-05-06 RX ADMIN — AMPICILLIN SODIUM AND SULBACTAM SODIUM 3 G: 2; 1 INJECTION, POWDER, FOR SOLUTION INTRAMUSCULAR; INTRAVENOUS at 18:00

## 2021-05-06 RX ADMIN — SODIUM CHLORIDE, POTASSIUM CHLORIDE, SODIUM LACTATE AND CALCIUM CHLORIDE 2000 ML: 600; 310; 30; 20 INJECTION, SOLUTION INTRAVENOUS at 06:23

## 2021-05-06 RX ADMIN — NOREPINEPHRINE BITARTRATE 2 MCG/MIN: 1 INJECTION INTRAVENOUS at 16:05

## 2021-05-06 RX ADMIN — HYDROCORTISONE SODIUM SUCCINATE 100 MG: 100 INJECTION, POWDER, FOR SOLUTION INTRAMUSCULAR; INTRAVENOUS at 21:26

## 2021-05-06 RX ADMIN — SODIUM CHLORIDE 2000 ML: 9 INJECTION, SOLUTION INTRAVENOUS at 18:07

## 2021-05-06 RX ADMIN — SODIUM CHLORIDE 1000 ML: 9 INJECTION, SOLUTION INTRAVENOUS at 14:31

## 2021-05-06 RX ADMIN — SODIUM CHLORIDE, POTASSIUM CHLORIDE, SODIUM LACTATE AND CALCIUM CHLORIDE 2000 ML: 600; 310; 30; 20 INJECTION, SOLUTION INTRAVENOUS at 16:39

## 2021-05-06 RX ADMIN — METOPROLOL TARTRATE 25 MG: 25 TABLET, FILM COATED ORAL at 06:22

## 2021-05-06 RX ADMIN — PREGABALIN 25 MG: 25 CAPSULE ORAL at 04:13

## 2021-05-06 RX ADMIN — DIAZEPAM 5 MG: 5 TABLET ORAL at 02:15

## 2021-05-06 RX ADMIN — SODIUM CHLORIDE 3 G: 900 INJECTION INTRAVENOUS at 03:10

## 2021-05-06 RX ADMIN — LORAZEPAM 1 MG: 2 INJECTION INTRAMUSCULAR; INTRAVENOUS at 04:14

## 2021-05-06 RX ADMIN — GABAPENTIN 300 MG: 300 CAPSULE ORAL at 06:22

## 2021-05-06 RX ADMIN — GABAPENTIN 300 MG: 300 CAPSULE ORAL at 12:43

## 2021-05-06 ASSESSMENT — COGNITIVE AND FUNCTIONAL STATUS - GENERAL
SUGGESTED CMS G CODE MODIFIER MOBILITY: CM
CLIMB 3 TO 5 STEPS WITH RAILING: TOTAL
HELP NEEDED FOR BATHING: A LOT
DRESSING REGULAR LOWER BODY CLOTHING: A LOT
TOILETING: A LOT
SUGGESTED CMS G CODE MODIFIER DAILY ACTIVITY: CK
TURNING FROM BACK TO SIDE WHILE IN FLAT BAD: A LOT
EATING MEALS: A LITTLE
WALKING IN HOSPITAL ROOM: TOTAL
MOVING FROM LYING ON BACK TO SITTING ON SIDE OF FLAT BED: UNABLE
MOVING TO AND FROM BED TO CHAIR: A LOT
STANDING UP FROM CHAIR USING ARMS: A LOT
PERSONAL GROOMING: A LITTLE
DAILY ACTIVITIY SCORE: 14
DRESSING REGULAR UPPER BODY CLOTHING: A LOT
MOBILITY SCORE: 9

## 2021-05-06 ASSESSMENT — PATIENT HEALTH QUESTIONNAIRE - PHQ9
1. LITTLE INTEREST OR PLEASURE IN DOING THINGS: SEVERAL DAYS
2. FEELING DOWN, DEPRESSED, IRRITABLE, OR HOPELESS: SEVERAL DAYS
5. POOR APPETITE OR OVEREATING: MORE THAN HALF THE DAYS
6. FEELING BAD ABOUT YOURSELF - OR THAT YOU ARE A FAILURE OR HAVE LET YOURSELF OR YOUR FAMILY DOWN: NOT AL ALL
9. THOUGHTS THAT YOU WOULD BE BETTER OFF DEAD, OR OF HURTING YOURSELF: NOT AT ALL
SUM OF ALL RESPONSES TO PHQ QUESTIONS 1-9: 10
7. TROUBLE CONCENTRATING ON THINGS, SUCH AS READING THE NEWSPAPER OR WATCHING TELEVISION: SEVERAL DAYS
3. TROUBLE FALLING OR STAYING ASLEEP OR SLEEPING TOO MUCH: MORE THAN HALF THE DAYS
SUM OF ALL RESPONSES TO PHQ9 QUESTIONS 1 AND 2: 2
4. FEELING TIRED OR HAVING LITTLE ENERGY: MORE THAN HALF THE DAYS
8. MOVING OR SPEAKING SO SLOWLY THAT OTHER PEOPLE COULD HAVE NOTICED. OR THE OPPOSITE, BEING SO FIGETY OR RESTLESS THAT YOU HAVE BEEN MOVING AROUND A LOT MORE THAN USUAL: SEVERAL DAYS

## 2021-05-06 ASSESSMENT — ENCOUNTER SYMPTOMS
WHEEZING: 0
BLURRED VISION: 0
DIZZINESS: 0
CLAUDICATION: 1
NECK PAIN: 0
SPEECH CHANGE: 0
BRUISES/BLEEDS EASILY: 0
NAUSEA: 0
DOUBLE VISION: 0
FEVER: 0
BACK PAIN: 0
FOCAL WEAKNESS: 0
HEARTBURN: 0
DEPRESSION: 0
CHILLS: 0
FLANK PAIN: 0
PALPITATIONS: 0
COUGH: 0
HEADACHES: 0
MYALGIAS: 1

## 2021-05-06 ASSESSMENT — LIFESTYLE VARIABLES
SUBSTANCE_ABUSE: 0
ALCOHOL_USE: NO
HAVE YOU EVER FELT YOU SHOULD CUT DOWN ON YOUR DRINKING: NO
EVER FELT BAD OR GUILTY ABOUT YOUR DRINKING: NO
TOTAL SCORE: 0
HAVE PEOPLE ANNOYED YOU BY CRITICIZING YOUR DRINKING: NO
CONSUMPTION TOTAL: INCOMPLETE
EVER HAD A DRINK FIRST THING IN THE MORNING TO STEADY YOUR NERVES TO GET RID OF A HANGOVER: NO

## 2021-05-06 ASSESSMENT — FIBROSIS 4 INDEX
FIB4 SCORE: 0.89
FIB4 SCORE: 1.61

## 2021-05-06 ASSESSMENT — PAIN DESCRIPTION - PAIN TYPE
TYPE: ACUTE PAIN;CHRONIC PAIN
TYPE: ACUTE PAIN;CHRONIC PAIN
TYPE: ACUTE PAIN

## 2021-05-06 ASSESSMENT — PAIN DESCRIPTION - DESCRIPTORS: DESCRIPTORS: SHARP;BURNING;ACHING

## 2021-05-06 NOTE — ED NOTES
Patient repositioned in bed and changed into dry linens. Nasal MRSA swab and second lactic collected and sent to lab.

## 2021-05-06 NOTE — ASSESSMENT & PLAN NOTE
Physical Therapy Daily Progress Note     Patient Name: Valerie Latham         :  1969  Referring Physician: Tavares Villa MD        Subjective   Valerie Latham reports: tape skin irritation and increased medial / volar heel and lateral ankle pain (L) -      Objective   More tender along peroneal tendon(s)  just post/superior to lateral malleolus and less around to styloid of 5th ray -   Tender along volar and medial calcaneus,-   (+) Medial calcaneal impingement (L) -   Calc Varus; Forefoot varus     See Exercise, Manual, and Modality Logs for complete treatment.  ORTHOTIC Fabrication / Fit / Train;  x30 min fabricated / modified and fitted insert with lateral posting to facilitate calc valgus and forefoot valgus to reduce medial calcaneal impingement -   Functional / Therapeutic Activities: 35 min  · TAPING / BRACING: NA  · Full foot assessment: ; prone/ standing / walking;   · Fitted w/ and instructed in use / wearing schedule for patellar tendon strap modified to stabilize Proximal Tib fib joint (L);   · SEE EXERCISE FLOW SHEET -   · Jt protection, ADL modification; Posture and       Assessment/Plan  (L) Ankle sprain; Peroneal tendon tear; Ankle Instability; Plantar fasciitis - Possible proximal tib-fib joint instability - medial calcaneal impingement (L)  Continued improvement w/ decreased pain and improved functional ability, but taping causing skin irritation and pain increased w/o tape -  -   Taping very helpful in decreasing pain and allowing improved gait and function and would benefit from customized orthic insert and patellar tendon strap to stabilize proximal tib-fib joint (L) -  Pain alleviated after fitting with customized orthotics and brace for prox tib-fib joint      Progress strengthening /stabilization /functional activity - assess orthotics - modify orthotics prn -      _________________________________________________  Manual Therapy:                 mins  S/p left AKA, new prosthetic 2days prior to admission  Cont ASA/Statin  F/u JERILYN  PT/OT  LPS consulted    [id number];  Therapeutic Exercise:    05     mins  01812;     Neuromuscular Brayan:        mins  09934;    Therapeutic Activity:      35     mins  83632;     Orthotic Chapin/Fit:             30      mins  95893     Ultrasound:                          mins  66047;    Electrical Stimulation:         mins  01686 ( );  Dry Needling                       mins self-pay     Timed Treatment:   70  mins                  Total Treatment:    75   mins     Wallace Recinos, PT  Physical Therapist

## 2021-05-06 NOTE — PROGRESS NOTES
"Patient found hypotensive upon routine rounds reports feeling a little dizzy.  RRT called for hypotension with symptoms.  Dr. Gonzalez notified received orders for 1L NS bolus, running.   With bolus running now improving BP (!) 89/33   Pulse 93   Temp (!) 38.2 °C (100.8 °F)   Resp 12   Ht 1.676 m (5' 6\")   Wt 114 kg (251 lb)   SpO2 96%     "

## 2021-05-06 NOTE — PROGRESS NOTES
[facility] Short Progress Note    Pt was admitted past midnight. Dr. Whitman's H&P, ED course, labs and imagings reviewed.     70 y.o. male with remote h/o left stump AKA, PVD, CAD, DM, HTN, HLD presented 5/6/2021 with persistent LLE spasm and pain, as well as worsening RLE pain.  In ED, vitals grossly unremarkable; afebrile. Exam was notable for Left AKA which is twitching. 3 cm area of fluctuance and induration with some superficial ulceration of the skin on the lower left stump. Warmth, swelling, tenderness, and induration over the right distal calf to upper calf, distal CSM intact. Abrasion to the right knee. Some tenderness to the medial right thigh. Bedside left stump needle aspiration was completed by ERP - sent for cytology and culture.  WBC 22.7k - given a dose of Unasyn. Patient was admitted to medicine service for further evaluation and treatment.    During my shift 5/6:  Vitals reviewed; Tmax 103F around 1219.  The rest of the vitals within normal parameters.  Pain scale reported - 8 in right leg    At bedside when seen alongside daughter Makayla, Pt was quite pleasant. Able to answer questions about the events leading to hospital but mostly bothered by spasm from L AKA site. Current treatment plans discussed with IV Abx, Pain management and IVF.     Exam was notable for RLE distal calf area erythema and tenderness to palpation, L AKA stump - intact, some induration upon palpation    Labs reviewed  CRP 18.73  UA neg for LE or nitrite, no blood  Blood culture in progress   Lactic acid <2    Fluid cell count - WBC >20k  Fluid culture w/ gram strain - no organism on gram stain; culture in progress     Imaging  US RLE duplex -  No superficial or deep venous thrombosis. Enlarged inguinal lymph nodes noted.    Plan:  Agree with Dr. Whitman's assessment and plan:  -C/w IV Abx (from procedure report, fine needle aspiration of abscess - follow culture)  -Will follow wound care and LPS (will sign off)  -Pain  management regimen (order set implemented)  -C/w IVF (monitor Cr)      Update around 14:19: RRT was called due to hypotension 73/30 (with T 100.8), RR and HR wnl. 1L NS bolus ordered with some improvement in BP. At bedside, no clinical status changes. AOx4 and just reported being tired from not getting sleep. Denied dizziness, blurry vision, chest pain or SOB.   -At this point, plan has been to continue to monitor; follow up repeated lactic acid. Clinically and current findings not suggestive of shock.    Update around 15:20: Another RRT was called as BP drop again to 70/40 on left and 78/48 on right. Case discussed with ICU swing Dr. Greenfield - another 2L bolus ordered and tena was to follow up closely. Meanwhile, to follow up lactate acid level sent about an hour ago. Pt was noted to be at times in 50s/30s. Pt dozes off but no acute complain and easily arousable.  Pt at this point is not responding to IVF at this point. Pt was seen with ICU Dr. Greenfield. Peripheral IV Levophed ordered and plan is now to transfer to ICU for possible CVC and pressor.   -Lactic acid at 1450 resulted 1.6.     Time spent: The patient is critically ill,, with high chance of deterioration into septic shock, and eventually death if left untreated. The care that has been undertaken is medically complex. I spent 35 minutes CRITICAL CARE TIME, including managing medical issues, coordination of care, not including doing procedures, with no overlap in critical care time.

## 2021-05-06 NOTE — PROGRESS NOTES
Patients BP began to decline further after bolus completed, Now reading 55/29 RRT is at bedside patients MS is unchanged.  Dr. Gonzalez notified he will see the patients.

## 2021-05-06 NOTE — CONSULTS
Critical Care Consultation    Date of consult: 5/6/2021    Referring Physician  Fady Gonzalez M.D.    Reason for Consultation  Hypotension     History of Presenting Illness  70 y.o. male brought history of left stump AKA, PVD, CAD, DM, hypertension, hyperlipidemia who presented 5/6/2021 complaining of persistent left lower extremity spasm and pain of his stump rated as moderate to severe and was achy at times and sharp pains at times as well.  Aggravated with walking alleviated by rest. He also complained of right lower extremity pain, admitted with right lower extremity cellulitis and infection of left AKA stump with small fluid collection aspirated in the ED and sent for culture.     Right lower extremity cellulitis borders were marked and patient was admitted to the floors and started on Unasyn. During the afternoon of his first day of admission patient developed hypotension 73/30 low-grade temperature and rapid response was called, he was given 1 L normal saline with slight improvement of BP, lactic acid was redrawn and was still within normal limits.  Approximately 1 hour after that another rapid response was called for another episode of hypotension 70/40, additional 2 L of lactated Ringer were ordered however patient's blood pressure was not responding.  The patient was quite somnolent, he will wake with stimulation but would fall asleep almost immediately after, was not complaining of any pain, dyspnea. when he was awake enough to answer questions he answered questions appropriately. peripheral Levophed was started and he was transferred to the ICU for central line placement and continued vasopressor support and treatment.      Code Status  Full Code    Review of Systems  Review of Systems   Unable to perform ROS: Mental acuity       Past Medical History   has a past medical history of Arthritis, Arthritis, Aspiration pneumonia (HCC) (12/27/2011), Backpain, Congestive heart failure (HCC), Daytime sleepiness,  Diabetes, Diabetes (HCC), ETOH abuse, ETOHism (HCC), Fall, Glaucoma, Glaucoma, Hypertension, Indigestion, Seizure (HCC), Sleep apnea, and Wears glasses.    Surgical History   has a past surgical history that includes pr amputate thigh,thru femur; other; other orthopedic surgery; other (1970); and pr amputation low leg thru tib/fib.    Family History  family history includes No Known Problems in his father and mother.    Social History   reports that he quit smoking about 4 years ago. He has a 8.75 pack-year smoking history. He has never used smokeless tobacco. He reports that he does not drink alcohol and does not use drugs.    Medications  Home Medications     Reviewed by Beto Summers (Pharmacy Tech) on 05/06/21 at 0345  Med List Status: Complete   Medication Last Dose Status   aspirin EC (ECOTRIN) 81 MG TBEC 5/5/2021 Active   calcium carbonate (TUMS) 500 MG Chew Tab 5/5/2021 Active   ibuprofen (MOTRIN) 200 MG Tab 5/5/2021 Active   lisinopril (PRINIVIL, ZESTRIL) 40 MG tablet 5/5/2021 Active   metoprolol (LOPRESSOR) 25 MG TABS 5/5/2021 Active   simvastatin (ZOCOR) 10 MG Tab 5/5/2021 Active   zolpidem (AMBIEN) 5 MG Tab prn Active              Current Facility-Administered Medications   Medication Dose Route Frequency Provider Last Rate Last Admin   • senna-docusate (PERICOLACE or SENOKOT S) 8.6-50 MG per tablet 2 tablet  2 tablet Oral BID Vicente Whitman M.D.   Stopped at 05/06/21 1800    And   • polyethylene glycol/lytes (MIRALAX) PACKET 1 Packet  1 Packet Oral QDAY PRN Vicente Whitman M.D.        And   • magnesium hydroxide (MILK OF MAGNESIA) suspension 30 mL  30 mL Oral QDAY PRN Vicente Whitman M.D.        And   • bisacodyl (DULCOLAX) suppository 10 mg  10 mg Rectal QDAY PRN Vicente Whitman M.D.       • lactated ringers infusion (BOLUS)  500 mL Intravenous Once PRN Vicente Whitman M.D.       • heparin injection 5,000 Units  5,000 Units Subcutaneous Q8HRS Vicente Whitman M.D.   5,000 Units at 05/06/21 2621   •  acetaminophen (Tylenol) tablet 650 mg  650 mg Oral Q6HRS PRN Vicente Whitman M.D.   650 mg at 05/06/21 1243   • ondansetron (ZOFRAN) syringe/vial injection 4 mg  4 mg Intravenous Q4HRS PRN Vicente Whitman M.D.       • ondansetron (ZOFRAN ODT) dispertab 4 mg  4 mg Oral Q4HRS PRN Vicente Whitman M.D.       • guaiFENesin dextromethorphan (ROBITUSSIN DM) 100-10 MG/5ML syrup 10 mL  10 mL Oral Q6HRS PRN Vicente Whitman M.D.       • ampicillin/sulbactam (UNASYN) 3 g in  mL IVPB  3 g Intravenous Q6HRS Vicente Whitman M.D.   Stopped at 05/06/21 1830   • lactated ringers infusion  2,000 mL Intravenous Continuous Vicente Whitman M.D. 100 mL/hr at 05/06/21 2133 Restarted at 05/06/21 2133   • methocarbamol (ROBAXIN) tablet 750 mg  750 mg Oral TID PRN Vicente Whitman M.D.   750 mg at 05/06/21 1243   • aspirin EC (ECOTRIN) tablet 81 mg  81 mg Oral QAM Vicente Whitman M.D.   81 mg at 05/06/21 0600   • calcium carbonate (TUMS) chewable tab 1,000 mg  1,000 mg Oral TID PRN Vicente Whitman M.D.       • [Held by provider] metoprolol tartrate (LOPRESSOR) tablet 25 mg  25 mg Oral BID Vicente Whitman M.D.   Stopped at 05/06/21 1800   • gabapentin (NEURONTIN) capsule 300 mg  300 mg Oral TID Vicente Whitman M.D.   Stopped at 05/06/21 1800   • atorvastatin (LIPITOR) tablet 40 mg  40 mg Oral Q EVENING Vicente Whitman M.D.   Stopped at 05/06/21 1800   • insulin regular (HumuLIN R,NovoLIN R) injection  1-6 Units Subcutaneous 4X/DAY GAY Whitman M.D.   Stopped at 05/06/21 0700    And   • glucose 4 g chewable tablet 16 g  16 g Oral Q15 MIN PRN Vicente Whitman M.D.        And   • dextrose 50% (D50W) injection 50 mL  50 mL Intravenous Q15 MIN PRN Vicente Whitman M.D.       • labetalol (NORMODYNE/TRANDATE) injection 10 mg  10 mg Intravenous Q4HRS PRN Vicente Whitman M.D.       • Pharmacy Consult Request ...Pain Management Review 1 Each  1 Each Other PHARMACY TO DOSE Fady Gonzalez M.D.       • norepinephrine (Levophed) 8 mg in 250 mL NS infusion (premix)  0-30 mcg/min  Intravenous Continuous Chon Greenfield M.D. 5.6 mL/hr at 05/06/21 2151 3 mcg/min at 05/06/21 2151   • [START ON 5/7/2021] hydrocortisone sodium succinate PF (SOLU-CORTEF) 100 MG injection 50 mg  50 mg Intravenous Q6HRS Chon Greenfield M.D.           Allergies  Allergies   Allergen Reactions   • Banana Swelling     Pt reports when bananas are consumed pt develops an itchy and swollen mouth and eyes water.         Vital Signs last 24 hours  Temp:  [36.2 °C (97.1 °F)-39.4 °C (103 °F)] 36.2 °C (97.1 °F)  Pulse:  [] 57  Resp:  [12-33] 13  BP: ()/(21-81) 77/42  SpO2:  [90 %-100 %] 99 %    Physical Exam  Physical Exam  Constitutional:       General: He is not in acute distress.     Appearance: He is not toxic-appearing.      Comments: 70-year-old male appears stated age, somnolent, awakens with stimulation but rapidly falls back asleep   HENT:      Head: Normocephalic and atraumatic.      Nose: Nose normal. No rhinorrhea.      Mouth/Throat:      Mouth: Mucous membranes are dry.      Pharynx: Oropharynx is clear.   Eyes:      General: No scleral icterus.     Extraocular Movements: Extraocular movements intact.      Pupils: Pupils are equal, round, and reactive to light.   Cardiovascular:      Rate and Rhythm: Normal rate and regular rhythm.      Heart sounds: No murmur. No gallop.    Pulmonary:      Effort: Pulmonary effort is normal. No respiratory distress.      Breath sounds: No wheezing or rhonchi.   Abdominal:      General: Bowel sounds are normal.      Palpations: Abdomen is soft.      Tenderness: There is no abdominal tenderness. There is no guarding.   Musculoskeletal:         General: Deformity present. No tenderness.      Cervical back: Normal range of motion and neck supple.      Comments: Left lower extremity above the knee amputation   Skin:     Capillary Refill: Capillary refill takes less than 2 seconds.      Findings: Erythema and lesion present.      Comments: Right lower extremity with  erythema receding from marked edges, nontender, small area of induration on left stump without any cellulitis of left stump.  Scarring to the patient's lower abdomen   Neurological:      Comments: Patient was drowsy/somnolent however when he was awake he was answering appropriately with pleasant mood, unable to complete full exam, wakes with stimulation follows commands moves all limbs spontaneously, face symmetrical, tongue midline         Fluids    Intake/Output Summary (Last 24 hours) at 5/6/2021 2318  Last data filed at 5/6/2021 2200  Gross per 24 hour   Intake 2743.51 ml   Output 2700 ml   Net 43.51 ml       Laboratory  Recent Results (from the past 48 hour(s))   CBC WITH DIFFERENTIAL    Collection Time: 05/06/21  2:18 AM   Result Value Ref Range    WBC 22.7 (H) 4.8 - 10.8 K/uL    RBC 4.75 4.70 - 6.10 M/uL    Hemoglobin 14.2 14.0 - 18.0 g/dL    Hematocrit 44.0 42.0 - 52.0 %    MCV 92.6 81.4 - 97.8 fL    MCH 29.9 27.0 - 33.0 pg    MCHC 32.3 (L) 33.7 - 35.3 g/dL    RDW 48.6 35.9 - 50.0 fL    Platelet Count 287 164 - 446 K/uL    MPV 10.0 9.0 - 12.9 fL    Neutrophils-Polys 84.90 (H) 44.00 - 72.00 %    Lymphocytes 9.10 (L) 22.00 - 41.00 %    Monocytes 4.30 0.00 - 13.40 %    Eosinophils 0.30 0.00 - 6.90 %    Basophils 0.50 0.00 - 1.80 %    Immature Granulocytes 0.90 0.00 - 0.90 %    Nucleated RBC 0.00 /100 WBC    Neutrophils (Absolute) 19.29 (H) 1.82 - 7.42 K/uL    Lymphs (Absolute) 2.08 1.00 - 4.80 K/uL    Monos (Absolute) 0.97 (H) 0.00 - 0.85 K/uL    Eos (Absolute) 0.07 0.00 - 0.51 K/uL    Baso (Absolute) 0.12 0.00 - 0.12 K/uL    Immature Granulocytes (abs) 0.21 (H) 0.00 - 0.11 K/uL    NRBC (Absolute) 0.00 K/uL   COMP METABOLIC PANEL    Collection Time: 05/06/21  2:18 AM   Result Value Ref Range    Sodium 136 135 - 145 mmol/L    Potassium 3.9 3.6 - 5.5 mmol/L    Chloride 104 96 - 112 mmol/L    Co2 21 20 - 33 mmol/L    Anion Gap 11.0 7.0 - 16.0    Glucose 124 (H) 65 - 99 mg/dL    Bun 16 8 - 22 mg/dL    Creatinine  1.30 0.50 - 1.40 mg/dL    Calcium 9.3 8.5 - 10.5 mg/dL    AST(SGOT) 18 12 - 45 U/L    ALT(SGPT) 23 2 - 50 U/L    Alkaline Phosphatase 97 30 - 99 U/L    Total Bilirubin 0.3 0.1 - 1.5 mg/dL    Albumin 3.4 3.2 - 4.9 g/dL    Total Protein 7.8 6.0 - 8.2 g/dL    Globulin 4.4 (H) 1.9 - 3.5 g/dL    A-G Ratio 0.8 g/dL   LACTIC ACID    Collection Time: 05/06/21  2:18 AM   Result Value Ref Range    Lactic Acid 1.5 0.5 - 2.0 mmol/L   ESTIMATED GFR    Collection Time: 05/06/21  2:18 AM   Result Value Ref Range    GFR If African American >60 >60 mL/min/1.73 m 2    GFR If Non African American 55 (A) >60 mL/min/1.73 m 2   Prothrombin time (INR)    Collection Time: 05/06/21  2:18 AM   Result Value Ref Range    PT 14.9 (H) 12.0 - 14.6 sec    INR 1.13 0.87 - 1.13   Cortisol    Collection Time: 05/06/21  2:18 AM   Result Value Ref Range    Cortisol 8.2 0.0 - 23.0 ug/dL   Sed Rate    Collection Time: 05/06/21  2:18 AM   Result Value Ref Range    Sed Rate Westergren 55 (H) 0 - 20 mm/hour   CRP QUANTITIVE (NON-CARDIAC)    Collection Time: 05/06/21  2:18 AM   Result Value Ref Range    Stat C-Reactive Protein 18.73 (H) 0.00 - 0.75 mg/dL   LDH    Collection Time: 05/06/21  2:18 AM   Result Value Ref Range    LDH Total 242 107 - 266 U/L   FLUID CELL COUNT    Collection Time: 05/06/21  3:19 AM   Result Value Ref Range    Fluid Type Synovial     Color-Body Fluid Red     Character-Body Fluid Bloody     Total RBC Count 603656 cells/uL    Total WBC 68961 cells/uL    Polys 82 %    Lymphs 17 %    Mononuclear Cells - Fluid 1 %    Comments see below    FLUID CULTURE W/GRAM STAIN    Collection Time: 05/06/21  3:19 AM    Specimen: Other Body Fluid; Synovial   Result Value Ref Range    Significant Indicator NEG     Source SYNO     Site Right Knee     Culture Result -     Gram Stain Result Moderate WBCs.  No organisms seen.      GRAM STAIN    Collection Time: 05/06/21  3:19 AM    Specimen: Synovial   Result Value Ref Range    Significant Indicator .      Source SYNO     Site Right Knee     Gram Stain Result Moderate WBCs.  No organisms seen.      Urinalysis    Collection Time: 05/06/21  4:08 AM    Specimen: Urine   Result Value Ref Range    Color DK Yellow     Character Clear     Specific Gravity 1.027 <1.035    Ph 6.5 5.0 - 8.0    Glucose Negative Negative mg/dL    Ketones Negative Negative mg/dL    Protein 30 (A) Negative mg/dL    Bilirubin Small (A) Negative    Urobilinogen, Urine 1.0 Negative    Nitrite Negative Negative    Leukocyte Esterase Negative Negative    Occult Blood Negative Negative    Micro Urine Req Microscopic    URINE MICROSCOPIC (W/UA)    Collection Time: 05/06/21  4:08 AM   Result Value Ref Range    WBC 0-2 (A) /hpf    RBC 0-2 (A) /hpf    Bacteria Negative None /hpf    Epithelial Cells Negative /hpf    Hyaline Cast 0-2 /lpf   SARS-CoV-2 PCR (24 hour In-House): Collect NP swab in VTM    Collection Time: 05/06/21  4:17 AM    Specimen: Nasopharyngeal; Respirate   Result Value Ref Range    SARS-CoV-2 Source NP Swab     SARS-CoV-2 by PCR NotDetected    MRSA By PCR (Amp)    Collection Time: 05/06/21  8:07 AM    Specimen: Nares; Respirate   Result Value Ref Range    Significant Indicator NEG     Source RESP     Site NARES     MRSA PCR Negative for MRSA by PCR.    Lactic Acid Every four hours after STAT order    Collection Time: 05/06/21  8:27 AM   Result Value Ref Range    Lactic Acid 1.5 0.5 - 2.0 mmol/L   POCT glucose device results    Collection Time: 05/06/21  9:56 AM   Result Value Ref Range    Glucose - Accu-Ck 93 65 - 99 mg/dL   POCT glucose device results    Collection Time: 05/06/21 12:52 PM   Result Value Ref Range    Glucose - Accu-Ck 122 (H) 65 - 99 mg/dL   Lactic Acid Every four hours after STAT order    Collection Time: 05/06/21  2:50 PM   Result Value Ref Range    Lactic Acid 1.6 0.5 - 2.0 mmol/L   Blood Culture,Hold    Collection Time: 05/06/21  2:50 PM   Result Value Ref Range    Blood Culture Hold Collected    Lactic Acid Every four  hours after STAT order    Collection Time: 05/06/21  3:50 PM   Result Value Ref Range    Lactic Acid 1.4 0.5 - 2.0 mmol/L   POCT glucose device results    Collection Time: 05/06/21  4:40 PM   Result Value Ref Range    Glucose - Accu-Ck 110 (H) 65 - 99 mg/dL   URINALYSIS    Collection Time: 05/06/21  6:05 PM    Specimen: Urine, Cath   Result Value Ref Range    Color Yellow     Character Clear     Specific Gravity 1.004 <1.035    Ph 7.0 5.0 - 8.0    Glucose Negative Negative mg/dL    Ketones Negative Negative mg/dL    Protein Negative Negative mg/dL    Bilirubin Negative Negative    Urobilinogen, Urine 0.2 Negative    Nitrite Negative Negative    Leukocyte Esterase Negative Negative    Occult Blood Large (A) Negative    Micro Urine Req Microscopic    URINE MICROSCOPIC (W/UA)    Collection Time: 05/06/21  6:05 PM   Result Value Ref Range    WBC 0-2 (A) /hpf    RBC 2-5 (A) /hpf    Bacteria Negative None /hpf    Epithelial Cells Rare /hpf   POCT arterial blood gas device results    Collection Time: 05/06/21  6:19 PM   Result Value Ref Range    Ph 7.385 (L) 7.400 - 7.500    Pco2 43.5 (H) 26.0 - 37.0 mmHg    Po2 131 (H) 64 - 87 mmHg    Tco2 27 20 - 33 mmol/L    S02 99 93 - 99 %    Hco3 26.0 (H) 17.0 - 25.0 mmol/L    BE 1 -4 - 3 mmol/L    Body Temp 36.9 C degrees    O2 Therapy 28 %    iPF Ratio 468     Ph Temp Stalin 7.387 (L) 7.400 - 7.500    Pco2 Temp Co 43.3 (H) 26.0 - 37.0 mmHg    Po2 Temp Cor 131 (H) 64 - 87 mmHg    Specimen Arterial     DelSys Other     LPM 2 lpm   POCT glucose device results    Collection Time: 05/06/21  6:26 PM   Result Value Ref Range    Glucose - Accu-Ck 123 (H) 65 - 99 mg/dL       Imaging  DX-CHEST-LIMITED (1 VIEW)   Final Result      1.  New right IJV central line tip projects over the SVC at the level of the aortic arch. No pneumothorax.      2.  Minor bilateral diffuse interstitial edema or pneumonia pattern.      US-EXTREMITY VENOUS LOWER UNILAT RIGHT   Final Result      US-JERILYN SINGLE LEVEL  BILAT    (Results Pending)       Assessment/Plan  * Cellulitis- (present on admission)  Assessment & Plan  -Right lower extremity cellulitis, area marked and erythema receding from borders, per bedside RN cellulitis significantly improved following antibiotics.  -Continue Unasyn.    Adrenal insufficiency (HCC)- (present on admission)  Assessment & Plan  -Cortisol drawn on admission returned 8.2 in setting of continued hypotension.  -Patient does have cellulitis but level of hypotension was not exactly matching up with the severity of cellulitis the patient was experiencing, blood pressure was in the 50s to 70s systolic, lactic acid was up within normal limits, patient became somnolent.  -Initiated stress dose steroids with 100 mg hydrocortisone IV x1 followed by 50 mg hydrocortisone IV every 8 hours.      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  Unasyn    PAD (peripheral artery disease) (HCC)- (present on admission)  Assessment & Plan  Status post left AKA, has been using new prosthesis.  Continue aspirin and statin.  Follow-up JERILYN.  Continue PT OT    Leg pain- (present on admission)  Assessment & Plan  -Left stump pain, likely 2/2 to overuse on his new prothesis the few days prior to presentation.  -RLE pain 2/2 cellulitis that is improving.   -LPS consulted by primary team, follow up on JERILYN.    CAD (coronary artery disease)- (present on admission)  Assessment & Plan  Continue aspirin and statin  Beta-blocker held given hypotension requiring vasopressor support    Sepsis (HCC)- (present on admission)  Assessment & Plan  This is Sepsis Present on admission  SIRS criteria identified on my evaluation include: Fever, with temperature greater than 101 deg F, Tachycardia, with heart rate greater than 90 BPM, Tachypnea, with respirations greater  than 20 per minute and Leukocytosis, with WBC greater than 12,000  Source is cellulitis right lower extremity and possible stump infection.  Sepsis protocol initiated  Fluid resuscitation ordered per protocol  IV antibiotics as appropriate for source of sepsis  While organ dysfunction may be noted elsewhere in this problem list or in the chart, degree of organ dysfunction does not meet CMS criteria for severe sepsis    Patient receiving Unasyn with reported improvement of right lower extremity cellulitis, erythema receding from margins, pain decreased.      LINDA (acute kidney injury) (HCC)- (present on admission)  Assessment & Plan  IVF  Renal dose meds, avoid nephrotoxins  Monitor urine output and I&O's  Follow renal function  Rule out post obstruction      CKD (chronic kidney disease), stage III- (present on admission)  Assessment & Plan  Monitor renal function  Monitor urine output  Avoid nephrotoxic agents and renally dose all medications as needed  Goal is to keep euvolemia    KEITH (obstructive sleep apnea)- (present on admission)  Assessment & Plan  CPAP nightly when clinically appropriate.    Type II diabetes mellitus (HCC)- (present on admission)  Assessment & Plan  A1c 6.2 March 2021  Insulin sliding scale  POC glucose before every meal and at bedtime    Essential hypertension- (present on admission)  Assessment & Plan  History of, patient is hypotensive requiring vasopressor support, metoprolol held for now.    Morbid obesity (HCC)- (present on admission)  Assessment & Plan   on weight loss and lifestyle changes when clinically appropriate.      Discussed patient condition and risk of morbidity and/or mortality with Hospitalist, Family, RN, RT, Pharmacy, Patient. I spoke with Dr. Patel from ICU who is in agreement with transfer to ICU.  The patient remains critically ill.  Critical care time = 50 minutes in directly providing and coordinating critical care and extensive data review.  No time overlap  and excludes procedures.  Patient remains hypotensive requiring vasopressor support to maintain maps greater than 65 and systolic blood pressure greater than 90, as well as frequent monitoring of neurological status, urine output and signs of other organ dysfunction.  Patient at risk for decompensation and death without the above interventions.

## 2021-05-06 NOTE — PROGRESS NOTES
Attending Hospitalist today is Dr Gonzalez starting at 0700. Please call this Physician for orders, updates and questions.

## 2021-05-06 NOTE — ASSESSMENT & PLAN NOTE
Cellulitis of right lower extremity  WBC 22.7, LA 1.5  S/p I&D of left stump by ERP -- wound cultures are growing Strep species and Peptostreptococcus (the lab is erroneous as this was not synovial fluid). If the collection does not go down with abx, consider orthopedics for I&D of possible abscess.  Blood cultures negative  Wound care  IV fluids were given  Pain control: gabapentin, PRN - robaxin, tylenol, oxycodone, morphine  Abx: IV Unasyn and possibly transition to augmentin on 5/10

## 2021-05-06 NOTE — WOUND TEAM
Wound consult received for RLE cellulitis. Clinical picture reviewed, patient has dry, flaky skin, no open or draining wounds. Recommend Aquaphor, ointment ordered per protocol. No advanced wound care needs at this time.

## 2021-05-06 NOTE — RESPIRATORY CARE
Respiratory Rapid Response Note    Symptoms Hypotension                    ABG Results      Pt placed on 3 liter oxy mask. Breath sounds clear decreased bases.          Transferred to ICU N/A

## 2021-05-06 NOTE — PROGRESS NOTES
Patient being seen by critical care at bedside, orders were placed for vasopressors. Critical Care RN at bedside, requested medication and verification.  Charge RN updated, plan for ICU transfer.  Dr. Gonzalez spoke with family to notify of the above.

## 2021-05-06 NOTE — PROGRESS NOTES
2 RN skin check complete with Rodrigo HENDERSON.   Devices in place none is refusing SCD due to cellulitis and pain.  Skin assessed under devices NA  Confirmed pressure ulcers found on none.  New potential pressure ulcers noted on none  . Wound consult placed, seen by LPS at bedside has scab to right knee which was dressed by LPS with hydraphera blue.   On his left stup he has dry sterile gauze, it was drained for culture and fluid in ED per notes.   He has scar tissue over abdomen and buttocks.    He has brusing and discoloration to RLE, with associated heat and swelling.  The area was marked by LPS.  The following interventions in place pillow support, Q2 turns.

## 2021-05-06 NOTE — ED TRIAGE NOTES
"Chief Complaint   Patient presents with   • Leg Pain     R leg pain, patient states he thinks he has cellulitus in his R leg again. Patient C/O R big toe pain. Hx of cellulitus x 3 in the R leg. Pt states he has twitches that are painful. Hx of restless leg syndrome.      Patient has a prosthetic on L leg, and is worried that his L stump may be infected, due to the pain from getting a new prosthetic. GCS 15.     Pt is alert and oriented, speaking in full sentences, follows commands and responds appropriately to questions. NAD. Resp are even and unlabored.      Pt placed in lobby. Pt educated on triage process. Pt encouraged to alert staff for any changes.     Patient and staff wearing appropriate PPE    /76   Pulse 87   Temp 36.8 °C (98.2 °F) (Oral)   Resp 14   Ht 1.676 m (5' 6\")   Wt 114 kg (251 lb)   SpO2 95%   BMI 40.51 kg/m²   "

## 2021-05-06 NOTE — ASSESSMENT & PLAN NOTE
This is Sepsis Present on admission  SIRS criteria identified on my evaluation include: Leukocytosis, with WBC greater than 12,000 and Bandemia, greater than 10% bands  Source is cellulitis  Suspected onset of infection (date and time) 5/6/2021  Sepsis protocol initiated  Fluid resuscitation ordered per protocol  IV antibiotics as appropriate for source of sepsis  While organ dysfunction may be noted elsewhere in this problem list or in the chart, degree of organ dysfunction does not meet CMS criteria for severe sepsis

## 2021-05-06 NOTE — ASSESSMENT & PLAN NOTE
Cellulitis, less likely PAD/claudication/cirtical limb ischemia  Doubt critical limb ischemic given LA 1.5 and palpable pulse  Trial of gabapetin  Cont ASA/statin  F/u RLE venous doppler US and JERILYN

## 2021-05-06 NOTE — H&P
Hospital Medicine History & Physical Note    Date of Service  5/6/2021    Primary Care Physician  CHOCO Lopez.    Consultants  LPS    Code Status  Full Code    Chief Complaint  Chief Complaint   Patient presents with   • Leg Pain     R leg pain, patient states he thinks he has cellulitus in his R leg again. Patient C/O R big toe pain. Hx of cellulitus x 3 in the R leg. Pt states he has twitches that are painful. Hx of restless leg syndrome.        History of Presenting Illness  70 y.o. male with remote h/o left stump AKA, PVD, CAD, DM, HTN, HLD who presented 5/6/2021 with persistent LLE spasm and pain, as well as worsening RLE pain.  Patient and patient's daughter who was at bedside in emergency room.  Per daughter, patient had new prosthetic leg 2days ago, he has been walking frequently with girlfriend. Since trying new prosthetic left leg, he noticed spasm of left stump but noted worsening pain as of today. Concern for RLE also raised. He was recently admitted in 3/2021 for right gluteal abscess which was I&D, received a few days of IV antibiotic for cellulitis and MSSA bacteremia then discharged. Patient returned to ER today, accompanied by daughter for lower extremities and stump pain concerning for cellulitis. I&D of left stump completed by ERP. WBC 22.7k, give a dose of Unasyn and requested for admission. Patient was admitted to medicine service for further evaluation and treatment.    Review of Systems  Review of Systems   Constitutional: Negative for chills and fever.   HENT: Negative for hearing loss and tinnitus.    Eyes: Negative for blurred vision and double vision.   Respiratory: Negative for cough and wheezing.    Cardiovascular: Positive for claudication. Negative for chest pain, palpitations and leg swelling.   Gastrointestinal: Negative for heartburn and nausea.   Genitourinary: Negative for dysuria and flank pain.   Musculoskeletal: Positive for joint pain and myalgias (muscle spasm).  Negative for back pain and neck pain.   Skin: Negative for itching and rash.   Neurological: Negative for dizziness, speech change, focal weakness and headaches.   Endo/Heme/Allergies: Negative for environmental allergies. Does not bruise/bleed easily.   Psychiatric/Behavioral: Negative for depression, substance abuse and suicidal ideas.       Past Medical History   has a past medical history of Arthritis, Arthritis, Aspiration pneumonia ([facility]) (12/27/2011), Backpain, Congestive heart failure ([facility]), Daytime sleepiness, Diabetes, Diabetes ([facility]), ETOH abuse, ETOHism ([facility]), Fall, Glaucoma, Glaucoma, Hypertension, Indigestion, Seizure ([facility]), Sleep apnea, and Wears glasses.    Surgical History   has a past surgical history that includes pr amputate thigh,thru femur; other; other orthopedic surgery; other (1970); and pr amputation low leg thru tib/fib.     Family History  family history includes No Known Problems in his father and mother.     Social History   reports that he quit smoking about 4 years ago. He has a 8.75 pack-year smoking history. He has never used smokeless tobacco. He reports that he does not drink alcohol and does not use drugs.    Allergies  Allergies   Allergen Reactions   • Banana Swelling     Pt reports when bananas are consumed pt develops an itchy and swollen mouth and eyes water.         Medications  Prior to Admission Medications   Prescriptions Last Dose Informant Patient Reported? Taking?   aspirin EC (ECOTRIN) 81 MG TBEC 5/5/2021 at Unknown time Patient Yes No   Sig: Take 81 mg by mouth every morning.   calcium carbonate (TUMS) 500 MG Chew Tab 5/5/2021 at Unknown time Patient Yes No   Sig: Chew 1,000 mg 3 times a day as needed. 2 tablets = 1000 mg.  Indications: Heartburn   ibuprofen (MOTRIN) 200 MG Tab 5/5/2021 at Unknown time Patient Yes No   Sig: Take 200 mg by mouth every 6 hours as needed for Mild Pain.   lisinopril (PRINIVIL, ZESTRIL) 40 MG tablet 5/5/2021 at am Patient Yes No   Sig:  Take 40 mg by mouth every morning.   metoprolol (LOPRESSOR) 25 MG TABS 5/5/2021 at pm Patient Yes No   Sig: Take 25 mg by mouth 2 times a day.   simvastatin (ZOCOR) 10 MG Tab 5/5/2021 at pm Patient Yes No   Sig: Take 10 mg by mouth every evening.   zolpidem (AMBIEN) 5 MG Tab prn at prn Patient No No   Sig: Take 1-2 tablets by mouth every evening as needed for insomnia. Bring to sleep study.      Facility-Administered Medications: None       Physical Exam  Temp:  [36.8 °C (98.2 °F)] 36.8 °C (98.2 °F)  Pulse:  [78-98] 78  Resp:  [14-16] 16  BP: ()/(42-76) 107/54  SpO2:  [94 %-98 %] 94 %    Physical Exam  Constitutional:       Appearance: He is obese.   HENT:      Head: Normocephalic and atraumatic.      Nose: Nose normal.      Mouth/Throat:      Mouth: Mucous membranes are dry.   Eyes:      General: No scleral icterus.     Extraocular Movements: Extraocular movements intact.   Cardiovascular:      Rate and Rhythm: Normal rate and regular rhythm.      Pulses: Normal pulses.      Heart sounds: No friction rub.   Pulmonary:      Effort: Pulmonary effort is normal. No respiratory distress.      Breath sounds: No wheezing.   Abdominal:      General: There is no distension.      Tenderness: There is no abdominal tenderness. There is no guarding or rebound.   Musculoskeletal:         General: Tenderness and deformity present.      Cervical back: Neck supple. No tenderness.      Comments: Left stump - s/p AKA, I&D 5/6 by ERP, spasmatic, tender, palpable femoral pulse    RLE - swollen, erythematous, tender, palpable pulse    Right buttock - tender   Skin:     General: Skin is warm and dry.      Capillary Refill: Capillary refill takes 2 to 3 seconds.   Neurological:      General: No focal deficit present.      Mental Status: He is alert and oriented to person, place, and time.      Motor: No weakness.   Psychiatric:         Mood and Affect: Mood normal.         Laboratory:  Recent Labs     05/06/21  0218   WBC 22.7*    RBC 4.75   HEMOGLOBIN 14.2   HEMATOCRIT 44.0   MCV 92.6   MCH 29.9   MCHC 32.3*   RDW 48.6   PLATELETCT 287   MPV 10.0     Recent Labs     05/06/21  0218   SODIUM 136   POTASSIUM 3.9   CHLORIDE 104   CO2 21   GLUCOSE 124*   BUN 16   CREATININE 1.30   CALCIUM 9.3     Recent Labs     05/06/21  0218   ALTSGPT 23   ASTSGOT 18   ALKPHOSPHAT 97   TBILIRUBIN 0.3   GLUCOSE 124*         No results for input(s): NTPROBNP in the last 72 hours.      No results for input(s): TROPONINT in the last 72 hours.    Imaging:  US-EXTREMITY VENOUS LOWER UNILAT RIGHT    (Results Pending)   US-EXTREMITY ARTERY LOWER BILAT W/JERILYN (COMBO)    (Results Pending)         Assessment/Plan:  I anticipate this patient will require at least two midnights for appropriate medical management, necessitating inpatient admission.    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)  Assessment & Plan  S/p I&D 5/6 in ED -- f/u wound Cx  abx as noted in cellulitis  LPS f/u appreciated    PAD (peripheral artery disease) ([facility])  Assessment & Plan  S/p left AKA, new prosthetic 2days prior to admission  Cont ASA/Statin  F/u JERILYN  PT/OT  LPS f/u appreciated    Leg pain- (present on admission)  Assessment & Plan  Cellulitis, less likely PAD/claudication/cirtical limb ischemia  Doubt critical limb ischemic given LA 1.5 and palpable pulse  Trial of gabapetin  Cont ASA/statin  F/u RLE venous doppler US and JERILYN    CAD (coronary artery disease)  Assessment & Plan  ASA/statin/BB    Sepsis (HCC)  Assessment & Plan  This is Sepsis Present on admission  SIRS criteria identified on my evaluation include: Leukocytosis, with WBC greater than 12,000 and Bandemia, greater than 10%  bands  Source is cellulitis  Suspected onset of infection (date and time) 5/6/2021  Sepsis protocol initiated  Fluid resuscitation ordered per protocol  IV antibiotics as appropriate for source of sepsis  While organ dysfunction may be noted elsewhere in this problem list or in the chart, degree of organ dysfunction does not meet CMS criteria for severe sepsis          Leukocytosis  Assessment & Plan  Secondary to cellulitis/sepsis    LINDA (acute kidney injury) (HCC)  Assessment & Plan  F/u FeNa  IVF  Minimize nephrotoxic agents, renally dose meds    CKD (chronic kidney disease), stage III  Assessment & Plan  Minimize nephrotoxic agents    Insomnia  Assessment & Plan  PRN ambien    KEITH (obstructive sleep apnea)  Assessment & Plan  CPAP    Type II diabetes mellitus (HCC)- (present on admission)  Assessment & Plan  HbA1c 6.2 in 3/2021  ISS    Essential hypertension- (present on admission)  Assessment & Plan  Metoprolol  Resume lisinopril when LINDA resolved  PRN labetalol    Morbid obesity (HCC)- (present on admission)  Assessment & Plan  BMI > 40  Diet and lifestyle modifications    VTE ppx: heparin SQ  Code: full  Diet: DM/cardiac  Dispo: admit

## 2021-05-06 NOTE — ED NOTES
Pt sleeps unless disturbed. Arouses easily to voice. Pt uncomfortable on gurney, given pillow and assisted to reposition. Hospital bed ordered.

## 2021-05-06 NOTE — PROGRESS NOTES
"/50   Pulse 83   Temp 37.4 °C (99.4 °F) (Oral)   Resp 12   Ht 1.676 m (5' 6\")   Wt 114 kg (251 lb)   SpO2 93%   Patient responding with levophed and fluid bolus receiving his second of three 1L bags of Normal saline.  Verbal report given at bedside to Erica.  Further care per ICU team.  Patient left with ICU RNs.  Daughter at bedside.     "
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
## 2021-05-07 ENCOUNTER — APPOINTMENT (OUTPATIENT)
Dept: RADIOLOGY | Facility: MEDICAL CENTER | Age: 71
DRG: 564 | End: 2021-05-07
Attending: STUDENT IN AN ORGANIZED HEALTH CARE EDUCATION/TRAINING PROGRAM
Payer: MEDICARE

## 2021-05-07 PROBLEM — R65.21 SEPTIC SHOCK (HCC): Status: ACTIVE | Noted: 2021-05-07

## 2021-05-07 PROBLEM — A41.9 SEPTIC SHOCK (HCC): Status: ACTIVE | Noted: 2021-05-07

## 2021-05-07 LAB
ALBUMIN SERPL BCP-MCNC: 2.7 G/DL (ref 3.2–4.9)
ALBUMIN/GLOB SERPL: 0.7 G/DL
ALP SERPL-CCNC: 74 U/L (ref 30–99)
ALT SERPL-CCNC: 17 U/L (ref 2–50)
ANION GAP SERPL CALC-SCNC: 7 MMOL/L (ref 7–16)
AST SERPL-CCNC: 14 U/L (ref 12–45)
BASOPHILS # BLD AUTO: 0.3 % (ref 0–1.8)
BASOPHILS # BLD: 0.05 K/UL (ref 0–0.12)
BILIRUB SERPL-MCNC: 0.3 MG/DL (ref 0.1–1.5)
BUN SERPL-MCNC: 13 MG/DL (ref 8–22)
CALCIUM SERPL-MCNC: 8.4 MG/DL (ref 8.5–10.5)
CHLORIDE SERPL-SCNC: 114 MMOL/L (ref 96–112)
CHOLEST SERPL-MCNC: 106 MG/DL (ref 100–199)
CO2 SERPL-SCNC: 21 MMOL/L (ref 20–33)
CREAT SERPL-MCNC: 0.99 MG/DL (ref 0.5–1.4)
CREAT UR-MCNC: 217.86 MG/DL
EOSINOPHIL # BLD AUTO: 0.05 K/UL (ref 0–0.51)
EOSINOPHIL NFR BLD: 0.3 % (ref 0–6.9)
ERYTHROCYTE [DISTWIDTH] IN BLOOD BY AUTOMATED COUNT: 49.7 FL (ref 35.9–50)
GLOBULIN SER CALC-MCNC: 3.8 G/DL (ref 1.9–3.5)
GLUCOSE BLD-MCNC: 130 MG/DL (ref 65–99)
GLUCOSE BLD-MCNC: 150 MG/DL (ref 65–99)
GLUCOSE BLD-MCNC: 155 MG/DL (ref 65–99)
GLUCOSE BLD-MCNC: 202 MG/DL (ref 65–99)
GLUCOSE SERPL-MCNC: 138 MG/DL (ref 65–99)
HCT VFR BLD AUTO: 40.8 % (ref 42–52)
HDLC SERPL-MCNC: 38 MG/DL
HGB BLD-MCNC: 13 G/DL (ref 14–18)
IMM GRANULOCYTES # BLD AUTO: 0.2 K/UL (ref 0–0.11)
IMM GRANULOCYTES NFR BLD AUTO: 1.1 % (ref 0–0.9)
LDLC SERPL CALC-MCNC: 49 MG/DL
LYMPHOCYTES # BLD AUTO: 1.1 K/UL (ref 1–4.8)
LYMPHOCYTES NFR BLD: 6.1 % (ref 22–41)
MAGNESIUM SERPL-MCNC: 1.9 MG/DL (ref 1.5–2.5)
MCH RBC QN AUTO: 30 PG (ref 27–33)
MCHC RBC AUTO-ENTMCNC: 31.9 G/DL (ref 33.7–35.3)
MCV RBC AUTO: 94 FL (ref 81.4–97.8)
MONOCYTES # BLD AUTO: 0.59 K/UL (ref 0–0.85)
MONOCYTES NFR BLD AUTO: 3.3 % (ref 0–13.4)
NEUTROPHILS # BLD AUTO: 16.02 K/UL (ref 1.82–7.42)
NEUTROPHILS NFR BLD: 88.9 % (ref 44–72)
NRBC # BLD AUTO: 0 K/UL
NRBC BLD-RTO: 0 /100 WBC
PHOSPHATE SERPL-MCNC: 2.4 MG/DL (ref 2.5–4.5)
PLATELET # BLD AUTO: 247 K/UL (ref 164–446)
PMV BLD AUTO: 9.7 FL (ref 9–12.9)
POTASSIUM SERPL-SCNC: 4.1 MMOL/L (ref 3.6–5.5)
PROCALCITONIN SERPL-MCNC: 0.18 NG/ML
PROT SERPL-MCNC: 6.5 G/DL (ref 6–8.2)
RBC # BLD AUTO: 4.34 M/UL (ref 4.7–6.1)
SODIUM SERPL-SCNC: 142 MMOL/L (ref 135–145)
SODIUM UR-SCNC: 172 MMOL/L
TRIGL SERPL-MCNC: 95 MG/DL (ref 0–149)
WBC # BLD AUTO: 18 K/UL (ref 4.8–10.8)

## 2021-05-07 PROCEDURE — A9270 NON-COVERED ITEM OR SERVICE: HCPCS | Performed by: INTERNAL MEDICINE

## 2021-05-07 PROCEDURE — 700102 HCHG RX REV CODE 250 W/ 637 OVERRIDE(OP): Performed by: STUDENT IN AN ORGANIZED HEALTH CARE EDUCATION/TRAINING PROGRAM

## 2021-05-07 PROCEDURE — 85025 COMPLETE CBC W/AUTO DIFF WBC: CPT

## 2021-05-07 PROCEDURE — 99233 SBSQ HOSP IP/OBS HIGH 50: CPT | Performed by: HOSPITALIST

## 2021-05-07 PROCEDURE — 97161 PT EVAL LOW COMPLEX 20 MIN: CPT

## 2021-05-07 PROCEDURE — 80053 COMPREHEN METABOLIC PANEL: CPT

## 2021-05-07 PROCEDURE — 700102 HCHG RX REV CODE 250 W/ 637 OVERRIDE(OP): Performed by: INTERNAL MEDICINE

## 2021-05-07 PROCEDURE — 84145 PROCALCITONIN (PCT): CPT

## 2021-05-07 PROCEDURE — 93926 LOWER EXTREMITY STUDY: CPT | Mod: 26 | Performed by: INTERNAL MEDICINE

## 2021-05-07 PROCEDURE — 700111 HCHG RX REV CODE 636 W/ 250 OVERRIDE (IP): Performed by: STUDENT IN AN ORGANIZED HEALTH CARE EDUCATION/TRAINING PROGRAM

## 2021-05-07 PROCEDURE — 93926 LOWER EXTREMITY STUDY: CPT | Mod: RT

## 2021-05-07 PROCEDURE — 83735 ASSAY OF MAGNESIUM: CPT

## 2021-05-07 PROCEDURE — 82962 GLUCOSE BLOOD TEST: CPT | Mod: 91

## 2021-05-07 PROCEDURE — 700111 HCHG RX REV CODE 636 W/ 250 OVERRIDE (IP): Performed by: INTERNAL MEDICINE

## 2021-05-07 PROCEDURE — A9270 NON-COVERED ITEM OR SERVICE: HCPCS | Performed by: STUDENT IN AN ORGANIZED HEALTH CARE EDUCATION/TRAINING PROGRAM

## 2021-05-07 PROCEDURE — 97166 OT EVAL MOD COMPLEX 45 MIN: CPT

## 2021-05-07 PROCEDURE — 770022 HCHG ROOM/CARE - ICU (200)

## 2021-05-07 PROCEDURE — 80061 LIPID PANEL: CPT

## 2021-05-07 PROCEDURE — 700105 HCHG RX REV CODE 258: Performed by: STUDENT IN AN ORGANIZED HEALTH CARE EDUCATION/TRAINING PROGRAM

## 2021-05-07 PROCEDURE — 84100 ASSAY OF PHOSPHORUS: CPT

## 2021-05-07 RX ORDER — MAGNESIUM SULFATE HEPTAHYDRATE 40 MG/ML
2 INJECTION, SOLUTION INTRAVENOUS ONCE
Status: COMPLETED | OUTPATIENT
Start: 2021-05-07 | End: 2021-05-07

## 2021-05-07 RX ADMIN — INSULIN HUMAN 1 UNITS: 100 INJECTION, SOLUTION PARENTERAL at 21:52

## 2021-05-07 RX ADMIN — GABAPENTIN 300 MG: 300 CAPSULE ORAL at 11:18

## 2021-05-07 RX ADMIN — DIBASIC SODIUM PHOSPHATE, MONOBASIC POTASSIUM PHOSPHATE AND MONOBASIC SODIUM PHOSPHATE 500 MG: 852; 155; 130 TABLET ORAL at 21:52

## 2021-05-07 RX ADMIN — ASPIRIN 81 MG: 81 TABLET, COATED ORAL at 05:36

## 2021-05-07 RX ADMIN — AMPICILLIN SODIUM AND SULBACTAM SODIUM 3 G: 2; 1 INJECTION, POWDER, FOR SOLUTION INTRAMUSCULAR; INTRAVENOUS at 17:28

## 2021-05-07 RX ADMIN — METHOCARBAMOL 750 MG: 750 TABLET ORAL at 19:45

## 2021-05-07 RX ADMIN — DIBASIC SODIUM PHOSPHATE, MONOBASIC POTASSIUM PHOSPHATE AND MONOBASIC SODIUM PHOSPHATE 500 MG: 852; 155; 130 TABLET ORAL at 10:18

## 2021-05-07 RX ADMIN — HYDROCORTISONE SODIUM SUCCINATE 50 MG: 100 INJECTION, POWDER, FOR SOLUTION INTRAMUSCULAR; INTRAVENOUS at 11:18

## 2021-05-07 RX ADMIN — HYDROCORTISONE SODIUM SUCCINATE 50 MG: 100 INJECTION, POWDER, FOR SOLUTION INTRAMUSCULAR; INTRAVENOUS at 05:20

## 2021-05-07 RX ADMIN — MAGNESIUM SULFATE 2 G: 2 INJECTION INTRAVENOUS at 10:18

## 2021-05-07 RX ADMIN — DIBASIC SODIUM PHOSPHATE, MONOBASIC POTASSIUM PHOSPHATE AND MONOBASIC SODIUM PHOSPHATE 500 MG: 852; 155; 130 TABLET ORAL at 12:36

## 2021-05-07 RX ADMIN — AMPICILLIN SODIUM AND SULBACTAM SODIUM 3 G: 2; 1 INJECTION, POWDER, FOR SOLUTION INTRAMUSCULAR; INTRAVENOUS at 05:21

## 2021-05-07 RX ADMIN — ATORVASTATIN CALCIUM 40 MG: 40 TABLET, FILM COATED ORAL at 17:23

## 2021-05-07 RX ADMIN — DOCUSATE SODIUM 50 MG AND SENNOSIDES 8.6 MG 2 TABLET: 8.6; 5 TABLET, FILM COATED ORAL at 05:36

## 2021-05-07 RX ADMIN — METHOCARBAMOL 750 MG: 750 TABLET ORAL at 12:35

## 2021-05-07 RX ADMIN — AMPICILLIN SODIUM AND SULBACTAM SODIUM 3 G: 2; 1 INJECTION, POWDER, FOR SOLUTION INTRAMUSCULAR; INTRAVENOUS at 12:43

## 2021-05-07 RX ADMIN — DIBASIC SODIUM PHOSPHATE, MONOBASIC POTASSIUM PHOSPHATE AND MONOBASIC SODIUM PHOSPHATE 500 MG: 852; 155; 130 TABLET ORAL at 17:23

## 2021-05-07 RX ADMIN — GABAPENTIN 300 MG: 300 CAPSULE ORAL at 17:23

## 2021-05-07 RX ADMIN — INSULIN HUMAN 2 UNITS: 100 INJECTION, SOLUTION PARENTERAL at 11:18

## 2021-05-07 RX ADMIN — HYDROCORTISONE SODIUM SUCCINATE 50 MG: 100 INJECTION, POWDER, FOR SOLUTION INTRAMUSCULAR; INTRAVENOUS at 17:28

## 2021-05-07 RX ADMIN — AMPICILLIN SODIUM AND SULBACTAM SODIUM 3 G: 2; 1 INJECTION, POWDER, FOR SOLUTION INTRAMUSCULAR; INTRAVENOUS at 23:45

## 2021-05-07 RX ADMIN — ENOXAPARIN SODIUM 40 MG: 40 INJECTION SUBCUTANEOUS at 14:52

## 2021-05-07 RX ADMIN — HEPARIN SODIUM 5000 UNITS: 5000 INJECTION, SOLUTION INTRAVENOUS; SUBCUTANEOUS at 05:23

## 2021-05-07 RX ADMIN — ACETAMINOPHEN 650 MG: 325 TABLET, FILM COATED ORAL at 23:45

## 2021-05-07 RX ADMIN — ACETAMINOPHEN 650 MG: 325 TABLET, FILM COATED ORAL at 13:00

## 2021-05-07 RX ADMIN — GABAPENTIN 300 MG: 300 CAPSULE ORAL at 05:37

## 2021-05-07 ASSESSMENT — GAIT ASSESSMENTS: GAIT LEVEL OF ASSIST: UNABLE TO PARTICIPATE

## 2021-05-07 ASSESSMENT — COGNITIVE AND FUNCTIONAL STATUS - GENERAL
CLIMB 3 TO 5 STEPS WITH RAILING: A LITTLE
TOILETING: A LITTLE
MOVING FROM LYING ON BACK TO SITTING ON SIDE OF FLAT BED: A LITTLE
DAILY ACTIVITIY SCORE: 22
PERSONAL GROOMING: A LITTLE
STANDING UP FROM CHAIR USING ARMS: A LITTLE
SUGGESTED CMS G CODE MODIFIER MOBILITY: CK
TURNING FROM BACK TO SIDE WHILE IN FLAT BAD: A LITTLE
DRESSING REGULAR UPPER BODY CLOTHING: A LITTLE
CLIMB 3 TO 5 STEPS WITH RAILING: A LITTLE
TURNING FROM BACK TO SIDE WHILE IN FLAT BAD: A LITTLE
SUGGESTED CMS G CODE MODIFIER DAILY ACTIVITY: CK
HELP NEEDED FOR BATHING: A LITTLE
TOILETING: A LITTLE
SUGGESTED CMS G CODE MODIFIER MOBILITY: CK
MOVING TO AND FROM BED TO CHAIR: A LITTLE
MOBILITY SCORE: 18
MOVING TO AND FROM BED TO CHAIR: A LOT
SUGGESTED CMS G CODE MODIFIER DAILY ACTIVITY: CJ
HELP NEEDED FOR BATHING: A LOT
MOBILITY SCORE: 18
DRESSING REGULAR LOWER BODY CLOTHING: A LOT
WALKING IN HOSPITAL ROOM: A LITTLE
WALKING IN HOSPITAL ROOM: A LITTLE
MOVING FROM LYING ON BACK TO SITTING ON SIDE OF FLAT BED: A LITTLE
DAILY ACTIVITIY SCORE: 17

## 2021-05-07 ASSESSMENT — ENCOUNTER SYMPTOMS
CHILLS: 0
SHORTNESS OF BREATH: 0
VOMITING: 0
NAUSEA: 0
FEVER: 0

## 2021-05-07 ASSESSMENT — PAIN DESCRIPTION - PAIN TYPE
TYPE: ACUTE PAIN;CHRONIC PAIN

## 2021-05-07 ASSESSMENT — ACTIVITIES OF DAILY LIVING (ADL): TOILETING: INDEPENDENT

## 2021-05-07 NOTE — PROCEDURES
Central Line Insertion    Date/Time: 5/6/2021 7:07 PM  Performed by: Chon Greenfield M.D.  Authorized by: Chon Greenfield M.D.     Consent:     Consent obtained:  Verbal    Consent given by:  Patient and spouse    Risks discussed:  Arterial puncture, incorrect placement, bleeding and pneumothorax    Alternatives discussed:  No treatment, delayed treatment and observation  Pre-procedure details:     Hand hygiene: Hand hygiene performed prior to insertion      Sterile barrier technique: All elements of maximal sterile technique followed      Skin preparation:  2% chlorhexidine    Skin preparation agent: Skin preparation agent completely dried prior to procedure    Sedation:     Sedation type:  None  Anesthesia:     Anesthesia method:  Local infiltration    Local anesthetic:  Lidocaine 1% w/o epi  Procedure details:     Location:  R internal jugular    Catheter size:  7.5 Fr    Landmarks identified: yes      Ultrasound guidance: yes      Sterile ultrasound techniques: Sterile gel and sterile probe covers were used      Number of attempts:  1    Successful placement: yes    Post-procedure details:     Post-procedure:  Dressing applied and line sutured    Assessment:  Blood return through all ports, no pneumothorax on x-ray, free fluid flow and placement verified by x-ray    Patient tolerance of procedure:  Tolerated well, no immediate complications

## 2021-05-07 NOTE — PROGRESS NOTES
Pulmonary and Critical Care Medicine Progress Note    OK to transfer out of ICU.  [facility] will sign off.  Please call if you have any questions.    Jesus Ann MD  Pulmonary and Critical Care Medicine

## 2021-05-07 NOTE — ASSESSMENT & PLAN NOTE
-Cortisol drawn on admission returned 8.2 in setting of continued hypotension.  -Patient does have cellulitis but level of hypotension was not exactly matching up with the severity of cellulitis the patient was experiencing, blood pressure was in the 50s to 70s systolic, lactic acid was up within normal limits, patient became somnolent.  -Initiated stress dose steroids with 100 mg hydrocortisone IV x1 followed by 50 mg hydrocortisone IV every 8 hours.

## 2021-05-07 NOTE — ASSESSMENT & PLAN NOTE
IVF  Renal dose meds, avoid nephrotoxins  Monitor urine output and I&O's  Follow renal function  Rule out post obstruction

## 2021-05-07 NOTE — THERAPY
Physical Therapy   Initial Evaluation     Patient Name: Servando Durham  Age:  70 y.o., Sex:  male  Medical Record #: [id number]  Today's Date: 5/7/2021     Precautions: Fall Risk(hx L AKA; has prosthesis at baseline)    Assessment  After initial evaluation and pt education, pt has no further acute PT needs. He appears to essentially be at his functional baseline. He reports if able to don prosthesis (currently unable 2' to pain/tender to pressure/touch at residual limb), he can ambulate with SPC without issue. If he is unable to don until LLE healed, he reports no concerns with being able to mobilize at wc level with intermittent assist from family if needed. He was able to demonstrate stand pivot transfers to/from EOB to chair without physical assist and pt appears quite aware of body mechanics and compensatory strategies. Will not actively follow at this time and pt should continue to mobilize as appropriate with floor staff. See below for details/recs.     Plan    Recommend Physical Therapy for Evaluation only     DC Equipment Recommendations: None  Discharge Recommendations: Recommend home health for continued physical therapy services(if available for formal home assessment in new/family enviornment)        05/07/21 1602   Prior Living Situation   Prior Services Home-Independent   Housing / Facility 1 Story House   Steps Into Home 4   Steps In Home 0   Equipment Owned Wheelchair;Single Point Cane   Lives with - Patient's Self Care Capacity Alone and Able to Care For Self   Comments reports plan to dc to [facility] home at time of dc (as above); reports can have family assist with wc bump to enter home or hop up stairs with assist if able;    Prior Level of Functional Mobility   Bed Mobility Independent   Transfer Status Independent   Ambulation Independent   Distance Ambulation (Feet)   (community)   Assistive Devices Used Single Point Cane   Stairs Independent   Comments pt rpeorts at baseline ambulatory with  prosthesis donned and SPC; however reports able to mobilize at wc level without issue as well (and reports often furniture walks at home/hopping) when needing to mobilize more quickly (as to not have to don prosthesis)   Cognition    Cognition / Consciousness WDL   Comments pleasant and cooperative; some baseline impulsivity per family    Passive ROM Lower Body   Passive ROM Lower Body X   Comments slightly limited at distal RLE 2' to pain; however WFL for mobility    Active ROM Lower Body    Active ROM Lower Body  X   Comments asa dorothea; pain limiting   Strength Lower Body   Lower Body Strength  X   Comments able to demonstrate appropriate resistance though pain is limiting to some degree with pressure   Sensation Lower Body   Lower Extremity Sensation   WDL   Comments intact touch/pressure though hypersensitive to touch   Balance Assessment   Sitting Balance (Static) Fair   Sitting Balance (Dynamic) Fair   Standing Balance (Static) Fair   Standing Balance (Dynamic) Fair   Weight Shift Sitting Fair   Weight Shift Standing Fair   Comments no bg LOB sitting EOB and during transfers to/from EOB<>chair (   Gait Analysis   Gait Level Of Assist Unable to Participate   Weight Bearing Status no restrictions per chart; however pt reports prosthesis will be too painful to don currently given LLE sensitivity   Bed Mobility    Supine to Sit Modified Independent   Sit to Supine Modified Independent   Functional Mobility   Sit to Stand Modified Independent   Bed, Chair, Wheelchair Transfer Modified Independent   Transfer Method Stand Pivot   How much difficulty does the patient currently have...   Turning over in bed (including adjusting bedclothes, sheets and blankets)? 3   Sitting down on and standing up from a chair with arms (e.g., wheelchair, bedside commode, etc.) 3   Moving from lying on back to sitting on the side of the bed? 2   How much help from another person does the patient currently need...   Moving to and from  a bed to a chair (including a wheelchair)? 4   Need to walk in a hospital room? 3   Climbing 3-5 steps with a railing? 3   6 clicks Mobility Score 18   Activity Tolerance   Sitting in Chair functional   Sitting Edge of Bed functional   Standing < 1 min with transfers   Comments general fatigue; no acute/overt fatigue   Edema / Skin Assessment   Edema / Skin  X   Comments dital RLE erythemic/tender; LLE tender to pressure at residual limb   Education Group   Education Provided Role of Physical Therapist   Role of Physical Therapist Patient Response Patient;Acceptance;Explanation;Verbal Demonstration   Additional Comments discussion re: dc planning and recs; possible scenerios with or without ability to don prosthesis with pt able to problem solve appropriate solutions

## 2021-05-07 NOTE — ASSESSMENT & PLAN NOTE
Monitor renal function  Monitor urine output  Avoid nephrotoxic agents and renally dose all medications as needed  Goal is to keep euvolemia

## 2021-05-07 NOTE — ASSESSMENT & PLAN NOTE
Relative adrenal insufficiency  Cortisol level of 8 in the setting of septic shock requiring pressors thus IV hydrocortisone and change to oral on 5/9.  He may will need to go home on an oral taper

## 2021-05-07 NOTE — PROGRESS NOTES
2 RN skin check complete.   Devices in place icu monitoring equipment  Skin assessed under devices.  Wound to Left knee.  Open areas noted on buttocks, scrotum, and abdomin.  Scarring to abdomin.  Hard area on Left stump,   Wound consult placed.  Interventions in place,

## 2021-05-07 NOTE — ASSESSMENT & PLAN NOTE
fluid collection noted on bedside ultrasound in the ED, aspirated and sent to [place] for culture follow-up results.  Unasyn

## 2021-05-07 NOTE — ASSESSMENT & PLAN NOTE
-Left stump pain, likely 2/2 to overuse on his new prothesis the few days prior to presentation.  -RLE pain 2/2 cellulitis that is improving.   -LPS consulted by primary team, follow up on JERILYN.

## 2021-05-07 NOTE — ASSESSMENT & PLAN NOTE
This is Sepsis Present on admission  SIRS criteria identified on my evaluation include: Fever, with temperature greater than 101 deg F, Tachycardia, with heart rate greater than 90 BPM, Tachypnea, with respirations greater than 20 per minute and Leukocytosis, with WBC greater than 12,000  Source is cellulitis right lower extremity and possible stump infection.  Sepsis protocol initiated  Fluid resuscitation ordered per protocol  IV antibiotics as appropriate for source of sepsis  While organ dysfunction may be noted elsewhere in this problem list or in the chart, degree of organ dysfunction does not meet CMS criteria for severe sepsis    Patient receiving Unasyn with reported improvement of right lower extremity cellulitis, erythema receding from margins, pain decreased.
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
## 2021-05-07 NOTE — CARE PLAN
Problem: Communication  Goal: The ability to communicate needs accurately and effectively will improve  Outcome: PROGRESSING AS EXPECTED     Problem: Safety  Goal: Will remain free from injury  Outcome: PROGRESSING AS EXPECTED     Problem: Venous Thromboembolism (VTW)/Deep Vein Thrombosis (DVT) Prevention:  Goal: Patient will participate in Venous Thrombosis (VTE)/Deep Vein Thrombosis (DVT)Prevention Measures  Outcome: PROGRESSING AS EXPECTED     Problem: Bowel/Gastric:  Goal: Normal bowel function is maintained or improved  Outcome: PROGRESSING AS EXPECTED     Problem: Respiratory:  Goal: Respiratory status will improve  Outcome: PROGRESSING AS EXPECTED     Problem: Infection  Goal: Will remain free from infection  Outcome: PROGRESSING SLOWER THAN EXPECTED

## 2021-05-07 NOTE — ASSESSMENT & PLAN NOTE
Status post left AKA, has been using new prosthesis.  Continue aspirin and statin.  Follow-up JERILYN.  Continue PT OT

## 2021-05-07 NOTE — ASSESSMENT & PLAN NOTE
This is Septic shock Present on admission  SIRS criteria identified on my evaluation include: Leukocytosis, with WBC greater than 12,000  Source is cellulitis   Suspected onset of infection 5/6  Presentation includes: Severe sepsis present and persistent hypotension after 30 ml/kg completed.   Despite appropriate fluid resuscitation with crystalloid given per sepsis guidelines, the patient remains hypotensive with systolic blood pressure less than 90 or MAP less than 65  Hemodynamic support with additional fluids and IV vasopressors as needed to maintain a SBP of 90 or MAP of 65  IV antibiotics as appropriate for source of sepsis  Reassessment: I have reassessed the patient's hemodynamic status

## 2021-05-07 NOTE — THERAPY
"Occupational Therapy   Initial Evaluation     Patient Name: Servando Durham  Age:  70 y.o., Sex:  male  Medical Record #: [id number]  Today's Date: 5/7/2021     Precautions  Precautions: Fall Risk  Comments: Hx of L AKA, prosthetic in room. Need clarification if allowed to wear prosthesis with wound    Assessment  Patient is 70 y.o. male admitted for LLE spasm/pain and RLE pain found to have septic shock, adrenal insufficiency, LLE stump/R buttock/RLE cellulitis, and LLE stump infection; s/p I&D of L stump. PMHx of L AKA, PVD, CAD, CKD, DM, obesity, and HTN. Reports can stay with dtr and she can assist PRN at d/c, but she has stairs to get into her home. Unclear if pt allowed to wear prosthesis or if will need to SPTxf to w/c or use FWW; awaiting clarification from physician. Currently limited by decreased functional mobility, activity tolerance, cognition, balance, and pain which are currently affecting pt's ability to complete ADLs/IADLs at baseline. Will continue to follow.     Plan    Recommend Occupational Therapy 2 times per week until therapy goals are met for the following treatments:  Adaptive Equipment, Neuro Re-Education / Balance, Self Care/Activities of Daily Living, Therapeutic Activities and Therapeutic Exercises.    DC Equipment Recommendations: Front-Wheel Walker, Tub Transfer Bench, Grab Bar(s) in Tub / Shower  Discharge Recommendations: Recommend home health for continued occupational therapy services      Objective       05/07/21 0916   Prior Living Situation   Prior Services Home-Independent   Housing / Facility Other (Comments)  (5th wheel)   Steps Into Home 0  (RAMP- but report is not very stable)   Bathroom Set up Bathtub / Shower Combination  (sits at edge of tub)   Equipment Owned Wheelchair;Grab Bar(s) In Tub / Shower;Ramp   Lives with - Patient's Self Care Capacity Alone and Able to Care For Self   Comments Reports lives alone w/above setup. Reports can stay with dtr for a \"little while\", but " she has 4 steps to get into home with a tub and no equipment.   Prior Level of ADL Function   Self Feeding Independent   Grooming / Hygiene Independent   Bathing Independent   Dressing Independent   Toileting Independent   Prior Level of IADL Function   Medication Management Independent   Laundry Independent   Kitchen Mobility Independent   Finances Independent   Home Management Independent   Shopping Independent   Prior Level Of Mobility Independent With Device in Community;Independent With Device in Home   Comments use of prosthetic leg   Precautions   Precautions Fall Risk   Comments Hx of L AKA, prosthetic in room. Need clarification if allowed to wear prosthesis with wound   Pain 0 - 10 Group   Location Leg   Location Orientation Right   Therapist Pain Assessment Post Activity Pain Same as Prior to Activity;During Activity;Nurse Notified  (not quantified)   Cognition    Cognition / Consciousness X   Level of Consciousness Alert   Safety Awareness Impaired;Impulsive  (possibly baseline personality)   Comments Pleasant and cooperative; dtr present and supportive. Impulsive, but dtr reports this is baseline   Passive ROM Upper Body   Passive ROM Upper Body WDL   Active ROM Upper Body   Active ROM Upper Body  WDL   Strength Upper Body   Upper Body Strength  WDL   Coordination Upper Body   Coordination WDL   Balance Assessment   Sitting Balance (Static) Fair   Sitting Balance (Dynamic) Fair -   Standing Balance (Static) Fair -   Standing Balance (Dynamic) Fair -   Weight Shift Sitting Good   Weight Shift Standing Fair   Comments w/o FWW req min A for safety, w/FWW SPV   Bed Mobility    Supine to Sit Minimal Assist   Sit to Supine Supervised   Scooting Supervised   Comments w/ HOB elevated   ADL Assessment   Grooming Supervision;Seated  (wiping face)   Lower Body Dressing Maximal Assist  (sock; reports d/t height of bed)   Toileting Moderate Assist  (rodriguez)   How much help from another person does the patient  currently need...   Putting on and taking off regular lower body clothing? 2   Bathing (including washing, rinsing, and drying)? 2   Toileting, which includes using a toilet, bedpan, or urinal? 3   Putting on and taking off regular upper body clothing? 3   Taking care of personal grooming such as brushing teeth? 3   Eating meals? 4   6 Clicks Daily Activity Score 17   Functional Mobility   Sit to Stand Supervised   Bed, Chair, Wheelchair Transfer Minimal Assist  (x2 per pt request)   Toilet Transfers   (declined need)   Transfer Method Stand Pivot   Mobility w/o FWW unstable req min A, w/FWW req CGA   Comments need clarification on WB to see if pt able to don prothesis   ICU Target Mobility Level   ICU Mobility - Targeted Level Level 3B   Edema / Skin Assessment   Edema / Skin  Not Assessed   Activity Tolerance   Sitting in Chair 5 min total   Sitting Edge of Bed 20 min   Standing ~1 min total   Comments c/o fatigue after txfs x2   Patient / Family Goals   Patient / Family Goal #1 to go home   Short Term Goals   Short Term Goal # 1 will complete seated g/h with SPV and no LOB   Short Term Goal # 2 will complete BSC txf with SPV   Short Term Goal # 3 will complete LB dressing with min A

## 2021-05-07 NOTE — PROGRESS NOTES
"[facility] Medicine Daily Progress Note    Date of Service  5/7/2021    Chief Complaint  70 y.o. male admitted 5/6/2021 with right lower extremity infection.    Hospital Course  70 y.o. male with remote h/o left stump AKA, PVD, CAD, DM, HTN, HLD who presented 5/6/2021 with persistent LLE spasm and pain, as well as worsening RLE pain.  Patient and patient's daughter who was at bedside in emergency room.  Per daughter, patient had new prosthetic leg 2 days ago, he has been walking frequently with girlfriend. Since trying new prosthetic left leg, he noticed spasm of left stump but noted worsening pain as of today. Concern for RLE also raised. He was recently admitted in 3/2021 for right gluteal abscess which was I&D, received a few days of IV antibiotic for cellulitis and MSSA bacteremia then discharged. Patient returned to ER today, accompanied by daughter for lower extremities and stump pain concerning for cellulitis. I&D of left stump completed by ERP. WBC 22.7k, give a dose of Unasyn and requested for admission. Patient was admitted to medicine service for further evaluation and treatment.  On 5/6 his blood pressure dropped to 70's/30's and he was transferred to the ICU for IV fluids and required a central line and IV pressors. His cortisol level was found to be low at 8 thus he was initiated on IV hydrocortisone.    Interval Problem Update  5/7: Mr. Durham was evaluated and examined in the ICU. He has been off IV pressors since 02:00. He remains on IV hydrocortisone. His daughter Makayla Pham is at bedside. Mr. Durham's BP remains low though he is not symptomatic. He complains that the left stump is \"jumping\". I examined his buttocks and there are some skin abrasions though not ulcers.     Consultants/Specialty  Critical care. I discussed with Dr. Ann this morning.    Code Status  Full Code    Disposition  Medical     Review of Systems  Review of Systems   Constitutional: Negative for chills and fever. "   Respiratory: Negative for shortness of breath.    Cardiovascular: Negative for chest pain.   Gastrointestinal: Negative for nausea and vomiting.   Musculoskeletal:        Right shin pain  Left stump pain   All other systems reviewed and are negative.       Physical Exam  Temp:  [35.8 °C (96.4 °F)-39.4 °C (103 °F)] 35.9 °C (96.6 °F)  Pulse:  [] 72  Resp:  [10-33] 24  BP: ()/(21-78) 97/49  SpO2:  [90 %-100 %] 95 %    Physical Exam  Vitals and nursing note reviewed.   Constitutional:       General: He is not in acute distress.     Appearance: He is obese.   HENT:      Head: Normocephalic and atraumatic.      Mouth/Throat:      Mouth: Mucous membranes are moist.      Pharynx: Oropharynx is clear.   Eyes:      General: No scleral icterus.     Conjunctiva/sclera: Conjunctivae normal.   Neck:      Comments: Right IJ central line  Cardiovascular:      Rate and Rhythm: Normal rate and regular rhythm.      Heart sounds: No murmur.   Pulmonary:      Effort: Pulmonary effort is normal. No respiratory distress.      Breath sounds: Normal breath sounds.   Abdominal:      General: There is distension.      Tenderness: There is no abdominal tenderness.   Musculoskeletal:      Cervical back: Normal range of motion and neck supple.      Comments: Left AKA stump has a small mass that is mildly tender and movable. No redness and no exudate.  Right shin is red, warm, mildly tender. 1+ edema right shin.   Skin:     General: Skin is warm and dry.   Neurological:      General: No focal deficit present.      Mental Status: He is alert and oriented to person, place, and time.   Psychiatric:         Mood and Affect: Mood normal.         Behavior: Behavior normal.         Thought Content: Thought content normal.         Judgment: Judgment normal.         Fluids    Intake/Output Summary (Last 24 hours) at 5/7/2021 1017  Last data filed at 5/7/2021 0800  Gross per 24 hour   Intake 4388 ml   Output 4350 ml   Net 38 ml        Laboratory  Recent Labs     05/06/21 0218 05/07/21  0350   WBC 22.7* 18.0*   RBC 4.75 4.34*   HEMOGLOBIN 14.2 13.0*   HEMATOCRIT 44.0 40.8*   MCV 92.6 94.0   MCH 29.9 30.0   MCHC 32.3* 31.9*   RDW 48.6 49.7   PLATELETCT 287 247   MPV 10.0 9.7     Recent Labs     05/06/21  0218 05/07/21  0350   SODIUM 136 142   POTASSIUM 3.9 4.1   CHLORIDE 104 114*   CO2 21 21   GLUCOSE 124* 138*   BUN 16 13   CREATININE 1.30 0.99   CALCIUM 9.3 8.4*     Recent Labs     05/06/21 0218   INR 1.13         Recent Labs     05/07/21  0350   TRIGLYCERIDE 95   HDL 38*   LDL 49       Imaging  DX-CHEST-LIMITED (1 VIEW)   Final Result      1.  New right IJV central line tip projects over the SVC at the level of the aortic arch. No pneumothorax.      2.  Minor bilateral diffuse interstitial edema or pneumonia pattern.      US-EXTREMITY VENOUS LOWER UNILAT RIGHT   Final Result      US-JERILYN SINGLE LEVEL BILAT    (Results Pending)        Assessment/Plan  * Septic shock (HCC)- (present on admission)  Assessment & Plan  This is Septic shock Present on admission  SIRS criteria identified on my evaluation include: Leukocytosis, with WBC greater than 12,000  Source is cellulitis   Suspected onset of infection 5/6  Presentation includes: Severe sepsis present and persistent hypotension after 30 ml/kg completed.   Despite appropriate fluid resuscitation with crystalloid given per sepsis guidelines, the patient remains hypotensive with systolic blood pressure less than 90 or MAP less than 65  Hemodynamic support with additional fluids and IV vasopressors as needed to maintain a SBP of 90 or MAP of 65  IV antibiotics as appropriate for source of sepsis  Reassessment: I have reassessed the patient's hemodynamic status      Adrenal insufficiency (HCC)- (present on admission)  Assessment & Plan  Relative adrenal insufficiency  Cortisol level of 8 in the setting of septic shock requiring pressors thus IV hydrocortisone    Infection of amputation stump of  left lower extremity (HCC)- (present on admission)  Assessment & Plan  S/p I&D 5/6 in ED -- f/u wound Cx  abx as noted in cellulitis  LPS f/u appreciated    PAD (peripheral artery disease) (HCC)- (present on admission)  Assessment & Plan  S/p left AKA, new prosthetic 2days prior to admission  Cont ASA/Statin  F/u JERILYN  PT/OT  LPS consulted    Leg pain- (present on admission)  Assessment & Plan  Cellulitis, less likely PAD/claudication/cirtical limb ischemia  Doubt critical limb ischemic given LA 1.5 and palpable pulse  Trial of gabapetin  Cont ASA/statin  F/u RLE venous doppler US and JERILYN    Cellulitis- (present on admission)  Assessment & Plan  Cellulitis of RLE, left stump and right buttock  WBC 22.7k, LA 1.5  S/p I&D of left stump by ERP -- wound cultures negative  Blood cultures negative  Wound care  IVF  Pain control: gabapentin, PRN - robaxin, tylenol, oxycodone, morphine  Abx: IV Unasyn      CAD (coronary artery disease)- (present on admission)  Assessment & Plan  ASA/statin/BB    Leukocytosis  Assessment & Plan  Secondary to cellulitis/sepsis    LINDA (acute kidney injury) (HCC)- (present on admission)  Assessment & Plan  F/u FeNa  IVF  Minimize nephrotoxic agents, renally dose meds    CKD (chronic kidney disease), stage III- (present on admission)  Assessment & Plan  Minimize nephrotoxic agents    Insomnia  Assessment & Plan  PRN ambien    KEITH (obstructive sleep apnea)- (present on admission)  Assessment & Plan  Nocturnal CPAP    Type II diabetes mellitus (HCC)- (present on admission)  Assessment & Plan  HbA1c 6.2 in 3/2021  ISS    Essential hypertension- (present on admission)  Assessment & Plan  Metoprolol  Resume lisinopril when LINDA resolved  PRN labetalol    Morbid obesity (HCC)- (present on admission)  Assessment & Plan  BMI  37  Diet and lifestyle modifications       VTE prophylaxis: Lovenox

## 2021-05-07 NOTE — ASSESSMENT & PLAN NOTE
-Right lower extremity cellulitis, area marked and erythema receding from borders, per bedside RN cellulitis significantly improved following antibiotics.  -Continue Unasyn.

## 2021-05-08 PROBLEM — E66.9 OBESITY (BMI 35.0-39.9 WITHOUT COMORBIDITY): Status: ACTIVE | Noted: 2021-05-08

## 2021-05-08 LAB
BACTERIA FLD AEROBE CULT: ABNORMAL
GLUCOSE BLD-MCNC: 135 MG/DL (ref 65–99)
GLUCOSE BLD-MCNC: 200 MG/DL (ref 65–99)
GLUCOSE BLD-MCNC: 234 MG/DL (ref 65–99)
GRAM STN SPEC: ABNORMAL
SIGNIFICANT IND 70042: ABNORMAL
SITE SITE: ABNORMAL
SOURCE SOURCE: ABNORMAL

## 2021-05-08 PROCEDURE — 700102 HCHG RX REV CODE 250 W/ 637 OVERRIDE(OP): Performed by: HOSPITALIST

## 2021-05-08 PROCEDURE — 82962 GLUCOSE BLOOD TEST: CPT | Mod: 91

## 2021-05-08 PROCEDURE — 700111 HCHG RX REV CODE 636 W/ 250 OVERRIDE (IP): Performed by: STUDENT IN AN ORGANIZED HEALTH CARE EDUCATION/TRAINING PROGRAM

## 2021-05-08 PROCEDURE — A9270 NON-COVERED ITEM OR SERVICE: HCPCS | Performed by: HOSPITALIST

## 2021-05-08 PROCEDURE — A9270 NON-COVERED ITEM OR SERVICE: HCPCS | Performed by: STUDENT IN AN ORGANIZED HEALTH CARE EDUCATION/TRAINING PROGRAM

## 2021-05-08 PROCEDURE — 700111 HCHG RX REV CODE 636 W/ 250 OVERRIDE (IP): Performed by: INTERNAL MEDICINE

## 2021-05-08 PROCEDURE — 700105 HCHG RX REV CODE 258: Performed by: STUDENT IN AN ORGANIZED HEALTH CARE EDUCATION/TRAINING PROGRAM

## 2021-05-08 PROCEDURE — 700102 HCHG RX REV CODE 250 W/ 637 OVERRIDE(OP): Performed by: STUDENT IN AN ORGANIZED HEALTH CARE EDUCATION/TRAINING PROGRAM

## 2021-05-08 PROCEDURE — 94660 CPAP INITIATION&MGMT: CPT

## 2021-05-08 PROCEDURE — 99233 SBSQ HOSP IP/OBS HIGH 50: CPT | Performed by: HOSPITALIST

## 2021-05-08 PROCEDURE — 770001 HCHG ROOM/CARE - MED/SURG/GYN PRIV*

## 2021-05-08 RX ORDER — METHOCARBAMOL 750 MG/1
750 TABLET, FILM COATED ORAL 3 TIMES DAILY PRN
Status: DISCONTINUED | OUTPATIENT
Start: 2021-05-08 | End: 2021-05-10 | Stop reason: HOSPADM

## 2021-05-08 RX ADMIN — GABAPENTIN 300 MG: 300 CAPSULE ORAL at 16:52

## 2021-05-08 RX ADMIN — METHOCARBAMOL 750 MG: 750 TABLET ORAL at 16:54

## 2021-05-08 RX ADMIN — GABAPENTIN 300 MG: 300 CAPSULE ORAL at 11:40

## 2021-05-08 RX ADMIN — DOCUSATE SODIUM 50 MG AND SENNOSIDES 8.6 MG 2 TABLET: 8.6; 5 TABLET, FILM COATED ORAL at 06:31

## 2021-05-08 RX ADMIN — AMPICILLIN SODIUM AND SULBACTAM SODIUM 3 G: 2; 1 INJECTION, POWDER, FOR SOLUTION INTRAMUSCULAR; INTRAVENOUS at 11:40

## 2021-05-08 RX ADMIN — ENOXAPARIN SODIUM 40 MG: 40 INJECTION SUBCUTANEOUS at 06:31

## 2021-05-08 RX ADMIN — ACETAMINOPHEN 650 MG: 325 TABLET, FILM COATED ORAL at 20:44

## 2021-05-08 RX ADMIN — HYDROCORTISONE SODIUM SUCCINATE 50 MG: 100 INJECTION, POWDER, FOR SOLUTION INTRAMUSCULAR; INTRAVENOUS at 06:31

## 2021-05-08 RX ADMIN — ATORVASTATIN CALCIUM 40 MG: 40 TABLET, FILM COATED ORAL at 16:52

## 2021-05-08 RX ADMIN — METHOCARBAMOL 750 MG: 750 TABLET ORAL at 07:25

## 2021-05-08 RX ADMIN — INSULIN HUMAN 2 UNITS: 100 INJECTION, SOLUTION PARENTERAL at 20:44

## 2021-05-08 RX ADMIN — AMPICILLIN SODIUM AND SULBACTAM SODIUM 3 G: 2; 1 INJECTION, POWDER, FOR SOLUTION INTRAMUSCULAR; INTRAVENOUS at 16:51

## 2021-05-08 RX ADMIN — HYDROCORTISONE SODIUM SUCCINATE 50 MG: 100 INJECTION, POWDER, FOR SOLUTION INTRAMUSCULAR; INTRAVENOUS at 16:52

## 2021-05-08 RX ADMIN — ASPIRIN 81 MG: 81 TABLET, COATED ORAL at 06:31

## 2021-05-08 RX ADMIN — GABAPENTIN 300 MG: 300 CAPSULE ORAL at 06:31

## 2021-05-08 RX ADMIN — AMPICILLIN SODIUM AND SULBACTAM SODIUM 3 G: 2; 1 INJECTION, POWDER, FOR SOLUTION INTRAMUSCULAR; INTRAVENOUS at 06:31

## 2021-05-08 RX ADMIN — INSULIN HUMAN 1 UNITS: 100 INJECTION, SOLUTION PARENTERAL at 11:42

## 2021-05-08 ASSESSMENT — ENCOUNTER SYMPTOMS
VOMITING: 0
ROS GI COMMENTS: EATING WELL
CHILLS: 0
FEVER: 0
NAUSEA: 0
SHORTNESS OF BREATH: 0

## 2021-05-08 ASSESSMENT — PAIN DESCRIPTION - PAIN TYPE
TYPE: ACUTE PAIN;CHRONIC PAIN
TYPE: CHRONIC PAIN
TYPE: CHRONIC PAIN
TYPE: ACUTE PAIN;CHRONIC PAIN
TYPE: CHRONIC PAIN
TYPE: ACUTE PAIN;CHRONIC PAIN
TYPE: ACUTE PAIN;CHRONIC PAIN
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
## 2021-05-08 NOTE — CARE PLAN
Problem: Infection  Goal: Will remain free from infection  Note: Monitor labs and vital signs, Iv antibiotics as ordered     Problem: Pain Management  Goal: Pain level will decrease to patient's comfort goal  Note: Prn meds per MAR

## 2021-05-08 NOTE — CARE PLAN
Problem: Pain Management  Goal: Pain level will decrease to patient's comfort goal  Outcome: PROGRESSING AS EXPECTED     Problem: Skin Integrity  Goal: Risk for impaired skin integrity will decrease  Outcome: PROGRESSING AS EXPECTED     Problem: Knowledge Deficit  Goal: Knowledge of disease process/condition, treatment plan, diagnostic tests, and medications will improve  5/8/2021 0143 by Dung Kulkarni R.N.  Outcome: PROGRESSING AS EXPECTED  5/8/2021 0143 by Dung Kulkarni R.N.  Outcome: PROGRESSING AS EXPECTED     Patient has had minimum pain throughout the shift. Gave PRN Tylenol and pain has subsided. Will continue to monitor.

## 2021-05-09 LAB
GLUCOSE BLD-MCNC: 105 MG/DL (ref 65–99)
GLUCOSE BLD-MCNC: 112 MG/DL (ref 65–99)
GLUCOSE BLD-MCNC: 132 MG/DL (ref 65–99)
GLUCOSE BLD-MCNC: 137 MG/DL (ref 65–99)

## 2021-05-09 PROCEDURE — A9270 NON-COVERED ITEM OR SERVICE: HCPCS | Performed by: HOSPITALIST

## 2021-05-09 PROCEDURE — 82962 GLUCOSE BLOOD TEST: CPT | Mod: 91

## 2021-05-09 PROCEDURE — 770001 HCHG ROOM/CARE - MED/SURG/GYN PRIV*

## 2021-05-09 PROCEDURE — 99233 SBSQ HOSP IP/OBS HIGH 50: CPT | Performed by: HOSPITALIST

## 2021-05-09 PROCEDURE — 700111 HCHG RX REV CODE 636 W/ 250 OVERRIDE (IP): Performed by: INTERNAL MEDICINE

## 2021-05-09 PROCEDURE — 700102 HCHG RX REV CODE 250 W/ 637 OVERRIDE(OP): Performed by: HOSPITALIST

## 2021-05-09 PROCEDURE — 700105 HCHG RX REV CODE 258: Performed by: STUDENT IN AN ORGANIZED HEALTH CARE EDUCATION/TRAINING PROGRAM

## 2021-05-09 PROCEDURE — 700102 HCHG RX REV CODE 250 W/ 637 OVERRIDE(OP): Performed by: STUDENT IN AN ORGANIZED HEALTH CARE EDUCATION/TRAINING PROGRAM

## 2021-05-09 PROCEDURE — 94660 CPAP INITIATION&MGMT: CPT

## 2021-05-09 PROCEDURE — A9270 NON-COVERED ITEM OR SERVICE: HCPCS | Performed by: STUDENT IN AN ORGANIZED HEALTH CARE EDUCATION/TRAINING PROGRAM

## 2021-05-09 PROCEDURE — 700111 HCHG RX REV CODE 636 W/ 250 OVERRIDE (IP): Performed by: STUDENT IN AN ORGANIZED HEALTH CARE EDUCATION/TRAINING PROGRAM

## 2021-05-09 RX ORDER — HYDROCORTISONE 20 MG/1
10 TABLET ORAL 2 TIMES DAILY
Status: DISCONTINUED | OUTPATIENT
Start: 2021-05-09 | End: 2021-05-10 | Stop reason: HOSPADM

## 2021-05-09 RX ADMIN — METHOCARBAMOL 750 MG: 750 TABLET ORAL at 05:30

## 2021-05-09 RX ADMIN — DOCUSATE SODIUM 50 MG AND SENNOSIDES 8.6 MG 2 TABLET: 8.6; 5 TABLET, FILM COATED ORAL at 17:02

## 2021-05-09 RX ADMIN — GABAPENTIN 300 MG: 300 CAPSULE ORAL at 05:30

## 2021-05-09 RX ADMIN — GABAPENTIN 300 MG: 300 CAPSULE ORAL at 17:01

## 2021-05-09 RX ADMIN — METHOCARBAMOL 750 MG: 750 TABLET ORAL at 21:16

## 2021-05-09 RX ADMIN — HYDROCORTISONE SODIUM SUCCINATE 25 MG: 100 INJECTION, POWDER, FOR SOLUTION INTRAMUSCULAR; INTRAVENOUS at 05:29

## 2021-05-09 RX ADMIN — AMPICILLIN SODIUM AND SULBACTAM SODIUM 3 G: 2; 1 INJECTION, POWDER, FOR SOLUTION INTRAMUSCULAR; INTRAVENOUS at 23:17

## 2021-05-09 RX ADMIN — GABAPENTIN 300 MG: 300 CAPSULE ORAL at 11:35

## 2021-05-09 RX ADMIN — ATORVASTATIN CALCIUM 40 MG: 40 TABLET, FILM COATED ORAL at 17:02

## 2021-05-09 RX ADMIN — ACETAMINOPHEN 650 MG: 325 TABLET, FILM COATED ORAL at 21:17

## 2021-05-09 RX ADMIN — AMPICILLIN SODIUM AND SULBACTAM SODIUM 3 G: 2; 1 INJECTION, POWDER, FOR SOLUTION INTRAMUSCULAR; INTRAVENOUS at 00:29

## 2021-05-09 RX ADMIN — AMPICILLIN SODIUM AND SULBACTAM SODIUM 3 G: 2; 1 INJECTION, POWDER, FOR SOLUTION INTRAMUSCULAR; INTRAVENOUS at 13:29

## 2021-05-09 RX ADMIN — AMPICILLIN SODIUM AND SULBACTAM SODIUM 3 G: 2; 1 INJECTION, POWDER, FOR SOLUTION INTRAMUSCULAR; INTRAVENOUS at 05:29

## 2021-05-09 RX ADMIN — ASPIRIN 81 MG: 81 TABLET, COATED ORAL at 05:30

## 2021-05-09 RX ADMIN — AMPICILLIN SODIUM AND SULBACTAM SODIUM 3 G: 2; 1 INJECTION, POWDER, FOR SOLUTION INTRAMUSCULAR; INTRAVENOUS at 17:31

## 2021-05-09 RX ADMIN — HYDROCORTISONE 10 MG: 20 TABLET ORAL at 17:01

## 2021-05-09 RX ADMIN — ENOXAPARIN SODIUM 40 MG: 40 INJECTION SUBCUTANEOUS at 05:29

## 2021-05-09 ASSESSMENT — PATIENT HEALTH QUESTIONNAIRE - PHQ9
2. FEELING DOWN, DEPRESSED, IRRITABLE, OR HOPELESS: NOT AT ALL
4. FEELING TIRED OR HAVING LITTLE ENERGY: NOT AT ALL
SUM OF ALL RESPONSES TO PHQ9 QUESTIONS 1 AND 2: 0
7. TROUBLE CONCENTRATING ON THINGS, SUCH AS READING THE NEWSPAPER OR WATCHING TELEVISION: NOT AT ALL
6. FEELING BAD ABOUT YOURSELF - OR THAT YOU ARE A FAILURE OR HAVE LET YOURSELF OR YOUR FAMILY DOWN: NOT AL ALL
1. LITTLE INTEREST OR PLEASURE IN DOING THINGS: NOT AT ALL
SUM OF ALL RESPONSES TO PHQ QUESTIONS 1-9: 0
8. MOVING OR SPEAKING SO SLOWLY THAT OTHER PEOPLE COULD HAVE NOTICED. OR THE OPPOSITE, BEING SO FIGETY OR RESTLESS THAT YOU HAVE BEEN MOVING AROUND A LOT MORE THAN USUAL: NOT AT ALL
3. TROUBLE FALLING OR STAYING ASLEEP OR SLEEPING TOO MUCH: NOT AT ALL
5. POOR APPETITE OR OVEREATING: NOT AT ALL
9. THOUGHTS THAT YOU WOULD BE BETTER OFF DEAD, OR OF HURTING YOURSELF: NOT AT ALL

## 2021-05-09 ASSESSMENT — LIFESTYLE VARIABLES
TOTAL SCORE: 0
EVER FELT BAD OR GUILTY ABOUT YOUR DRINKING: NO
TOTAL SCORE: 0
EVER HAD A DRINK FIRST THING IN THE MORNING TO STEADY YOUR NERVES TO GET RID OF A HANGOVER: NO
HOW MANY TIMES IN THE PAST YEAR HAVE YOU HAD 5 OR MORE DRINKS IN A DAY: 0
HAVE YOU EVER FELT YOU SHOULD CUT DOWN ON YOUR DRINKING: NO
AVERAGE NUMBER OF DAYS PER WEEK YOU HAVE A DRINK CONTAINING ALCOHOL: 0
CONSUMPTION TOTAL: NEGATIVE
ON A TYPICAL DAY WHEN YOU DRINK ALCOHOL HOW MANY DRINKS DO YOU HAVE: 0
HAVE PEOPLE ANNOYED YOU BY CRITICIZING YOUR DRINKING: NO
TOTAL SCORE: 0
ALCOHOL_USE: NO

## 2021-05-09 ASSESSMENT — PAIN DESCRIPTION - PAIN TYPE
TYPE: CHRONIC PAIN

## 2021-05-09 ASSESSMENT — ENCOUNTER SYMPTOMS
FEVER: 0
VOMITING: 0
SHORTNESS OF BREATH: 0
NAUSEA: 0
ROS GI COMMENTS: EATING WELL
CHILLS: 0

## 2021-05-09 ASSESSMENT — FIBROSIS 4 INDEX: FIB4 SCORE: 0.93
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
## 2021-05-09 NOTE — CARE PLAN
Problem: Communication  Goal: The ability to communicate needs accurately and effectively will improve  Outcome: PROGRESSING AS EXPECTED     Problem: Safety  Goal: Will remain free from injury  Outcome: PROGRESSING AS EXPECTED  Goal: Will remain free from falls  Outcome: PROGRESSING AS EXPECTED     Problem: Infection  Goal: Will remain free from infection  Outcome: PROGRESSING AS EXPECTED     Problem: Venous Thromboembolism (VTW)/Deep Vein Thrombosis (DVT) Prevention:  Goal: Patient will participate in Venous Thrombosis (VTE)/Deep Vein Thrombosis (DVT)Prevention Measures  Outcome: PROGRESSING AS EXPECTED     Problem: Bowel/Gastric:  Goal: Normal bowel function is maintained or improved  Outcome: PROGRESSING AS EXPECTED  Goal: Will not experience complications related to bowel motility  Outcome: PROGRESSING AS EXPECTED     Problem: Knowledge Deficit  Goal: Knowledge of disease process/condition, treatment plan, diagnostic tests, and medications will improve  Outcome: PROGRESSING AS EXPECTED  Goal: Knowledge of the prescribed therapeutic regimen will improve  Outcome: PROGRESSING AS EXPECTED     Problem: Discharge Barriers/Planning  Goal: Patient's continuum of care needs will be met  Outcome: PROGRESSING AS EXPECTED     Problem: Pain Management  Goal: Pain level will decrease to patient's comfort goal  Outcome: PROGRESSING AS EXPECTED     Problem: Fluid Volume:  Goal: Will maintain balanced intake and output  Outcome: PROGRESSING AS EXPECTED     Problem: Respiratory:  Goal: Respiratory status will improve  Outcome: PROGRESSING AS EXPECTED     Problem: Skin Integrity  Goal: Risk for impaired skin integrity will decrease  Outcome: PROGRESSING AS EXPECTED
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
## 2021-05-10 VITALS
HEIGHT: 66 IN | DIASTOLIC BLOOD PRESSURE: 70 MMHG | OXYGEN SATURATION: 91 % | BODY MASS INDEX: 38.62 KG/M2 | RESPIRATION RATE: 18 BRPM | HEART RATE: 70 BPM | TEMPERATURE: 98.2 F | SYSTOLIC BLOOD PRESSURE: 133 MMHG | WEIGHT: 240.3 LBS

## 2021-05-10 PROBLEM — A41.9 SEPTIC SHOCK (HCC): Status: RESOLVED | Noted: 2021-05-07 | Resolved: 2021-05-10

## 2021-05-10 PROBLEM — R65.21 SEPTIC SHOCK (HCC): Status: RESOLVED | Noted: 2021-05-07 | Resolved: 2021-05-10

## 2021-05-10 LAB
GLUCOSE BLD-MCNC: 83 MG/DL (ref 65–99)
GLUCOSE BLD-MCNC: 96 MG/DL (ref 65–99)
LACTATE BLD-SCNC: 1.4 MMOL/L (ref 0.5–2)

## 2021-05-10 PROCEDURE — 82962 GLUCOSE BLOOD TEST: CPT | Mod: 91

## 2021-05-10 PROCEDURE — 700102 HCHG RX REV CODE 250 W/ 637 OVERRIDE(OP): Performed by: STUDENT IN AN ORGANIZED HEALTH CARE EDUCATION/TRAINING PROGRAM

## 2021-05-10 PROCEDURE — 700111 HCHG RX REV CODE 636 W/ 250 OVERRIDE (IP): Performed by: STUDENT IN AN ORGANIZED HEALTH CARE EDUCATION/TRAINING PROGRAM

## 2021-05-10 PROCEDURE — 85007 BL SMEAR W/DIFF WBC COUNT: CPT

## 2021-05-10 PROCEDURE — 83605 ASSAY OF LACTIC ACID: CPT

## 2021-05-10 PROCEDURE — 700111 HCHG RX REV CODE 636 W/ 250 OVERRIDE (IP): Performed by: INTERNAL MEDICINE

## 2021-05-10 PROCEDURE — 94660 CPAP INITIATION&MGMT: CPT

## 2021-05-10 PROCEDURE — 700102 HCHG RX REV CODE 250 W/ 637 OVERRIDE(OP): Performed by: HOSPITALIST

## 2021-05-10 PROCEDURE — A9270 NON-COVERED ITEM OR SERVICE: HCPCS | Performed by: HOSPITALIST

## 2021-05-10 PROCEDURE — 99239 HOSP IP/OBS DSCHRG MGMT >30: CPT | Performed by: HOSPITALIST

## 2021-05-10 PROCEDURE — A9270 NON-COVERED ITEM OR SERVICE: HCPCS | Performed by: STUDENT IN AN ORGANIZED HEALTH CARE EDUCATION/TRAINING PROGRAM

## 2021-05-10 PROCEDURE — 700105 HCHG RX REV CODE 258: Performed by: STUDENT IN AN ORGANIZED HEALTH CARE EDUCATION/TRAINING PROGRAM

## 2021-05-10 PROCEDURE — 85027 COMPLETE CBC AUTOMATED: CPT

## 2021-05-10 PROCEDURE — 80053 COMPREHEN METABOLIC PANEL: CPT

## 2021-05-10 PROCEDURE — 99285 EMERGENCY DEPT VISIT HI MDM: CPT

## 2021-05-10 RX ORDER — NOREPINEPHRINE BITARTRATE 0.03 MG/ML
INJECTION, SOLUTION INTRAVENOUS
Status: DISPENSED
Start: 2021-05-06 | End: 2021-05-06

## 2021-05-10 RX ORDER — AMOXICILLIN AND CLAVULANATE POTASSIUM 875; 125 MG/1; MG/1
1 TABLET, FILM COATED ORAL 2 TIMES DAILY
Qty: 10 TABLET | Refills: 0 | Status: SHIPPED | OUTPATIENT
Start: 2021-05-10 | End: 2022-07-18 | Stop reason: SDUPTHER

## 2021-05-10 RX ORDER — GABAPENTIN 100 MG/1
100 CAPSULE ORAL 3 TIMES DAILY
Qty: 90 CAPSULE | Refills: 2 | Status: SHIPPED | OUTPATIENT
Start: 2021-05-10

## 2021-05-10 RX ADMIN — AMPICILLIN SODIUM AND SULBACTAM SODIUM 3 G: 2; 1 INJECTION, POWDER, FOR SOLUTION INTRAMUSCULAR; INTRAVENOUS at 11:39

## 2021-05-10 RX ADMIN — ENOXAPARIN SODIUM 40 MG: 40 INJECTION SUBCUTANEOUS at 05:04

## 2021-05-10 RX ADMIN — GABAPENTIN 300 MG: 300 CAPSULE ORAL at 05:04

## 2021-05-10 RX ADMIN — AMPICILLIN SODIUM AND SULBACTAM SODIUM 3 G: 2; 1 INJECTION, POWDER, FOR SOLUTION INTRAMUSCULAR; INTRAVENOUS at 05:05

## 2021-05-10 RX ADMIN — METHOCARBAMOL 750 MG: 750 TABLET ORAL at 06:30

## 2021-05-10 RX ADMIN — HYDROCORTISONE 10 MG: 20 TABLET ORAL at 06:00

## 2021-05-10 RX ADMIN — ACETAMINOPHEN 650 MG: 325 TABLET, FILM COATED ORAL at 06:30

## 2021-05-10 RX ADMIN — ASPIRIN 81 MG: 81 TABLET, COATED ORAL at 05:04

## 2021-05-10 RX ADMIN — DOCUSATE SODIUM 50 MG AND SENNOSIDES 8.6 MG 2 TABLET: 8.6; 5 TABLET, FILM COATED ORAL at 05:04

## 2021-05-10 RX ADMIN — GABAPENTIN 300 MG: 300 CAPSULE ORAL at 11:39

## 2021-05-10 ASSESSMENT — PAIN DESCRIPTION - PAIN TYPE
TYPE: ACUTE PAIN;CHRONIC PAIN
TYPE: CHRONIC PAIN
TYPE: ACUTE PAIN
TYPE: CHRONIC PAIN

## 2021-05-10 ASSESSMENT — FIBROSIS 4 INDEX: FIB4 SCORE: 0.93

## 2021-05-10 NOTE — DISCHARGE PLANNING
5548- Spoke To: Lincoln  Agency/Facility Name: [facility]  Plan or Request: LYNNE cancelled walker. Medicare will not cover a tub transfer bench.

## 2021-05-10 NOTE — PROGRESS NOTES
Aaox4.  IV out. Instructions and rx info given to pt. Has walker and janet nguyen. Gurdeep RO.  Instructions on c/o cellulits.

## 2021-05-10 NOTE — DISCHARGE PLANNING
Received Choice form at 1325  Agency/Facility Name: [facility]  Referral sent per Choice form at [phone]

## 2021-05-10 NOTE — DISCHARGE INSTRUCTIONS
Instructions per Dr. Waldrop:  Do no take lisinopril or metoprolol until you follow-up with your primary care provider.    Discharge Instructions    Discharged to home by car with relative. Discharged via wheelchair, hospital escort: Yes.  Special equipment needed: Walker    Be sure to schedule a follow-up appointment with your primary care doctor or any specialists as instructed.     Discharge Plan:        I understand that a diet low in cholesterol, fat, and sodium is recommended for good health. Unless I have been given specific instructions below for another diet, I accept this instruction as my diet prescription.   Other diet: cardiac    Special Instructions: care of cellulitis    · Is patient discharged on Warfarin / Coumadin?   No     Depression / Suicide Risk    As you are discharged from this [facility] facility, it is important to learn how to keep safe from harming yourself.    Recognize the warning signs:  · Abrupt changes in personality, positive or negative- including increase in energy   · Giving away possessions  · Change in eating patterns- significant weight changes-  positive or negative  · Change in sleeping patterns- unable to sleep or sleeping all the time   · Unwillingness or inability to communicate  · Depression  · Unusual sadness, discouragement and loneliness  · Talk of wanting to die  · Neglect of personal appearance   · Rebelliousness- reckless behavior  · Withdrawal from people/activities they love  · Confusion- inability to concentrate     If you or a loved one observes any of these behaviors or has concerns about self-harm, here's what you can do:  · Talk about it- your feelings and reasons for harming yourself  · Remove any means that you might use to hurt yourself (examples: pills, rope, extension cords, firearm)  · Get professional help from the community (Mental Health, Substance Abuse, psychological counseling)  · Do not be alone:Call your Safe Contact- someone whom you trust  who will be there for you.  · Call your local CRISIS HOTLINE 065-7622 or 500-112-7552  · Call your local Children's Mobile Crisis Response Team [place] (191) 731-0121 or www.I-Works  · Call the toll free National Suicide Prevention Hotlines   · National Suicide Prevention Lifeline 793-301-LWRB (3636)  · National [employer] Network 800-SUICIDE (640-7297)
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
## 2021-05-10 NOTE — FACE TO FACE
Face to Face Note  -  Durable Medical Equipment    Ramos Waldrop M.D. - NPI: [id number]  I certify that this patient is under my care and that they have had a durable medical equipment(DME)face to face encounter by myself that meets the physician DME face-to-face encounter requirements with this patient on:    Date of encounter:   Patient:                    MRN:                       YOB: 2019  Servando Durham  [id number]  1950     The encounter with the patient was in whole, or in part, for the following medical condition, which is the primary reason for durable medical equipment:  Other - left above the knee amputation     I certify that, based on my findings, the following durable medical equipment is medically necessary:  Walkers. And tub transfer bench    HOME O2 Saturation Measurements:(Values must be present for Home Oxygen orders)         ,     ,         My Clinical findings support the need for the above equipment due to:  Abnormal Gait    Supporting Symptoms: left above the knee ampuation      ------------------------------------------------------------------------------------------------------------------    Face to Face Supporting Documentation - Home Health    The encounter with this patient was in whole or in part the primary reason for home health admission.    Date of encounter:   Patient:                    MRN:                       YOB: 2019  Servando Durham  [id number]  1950     Home health to see patient for:  Skilled Nursing care for assessment, interventions & education    Skilled need for:  Exacerbation of Chronic Disease State left above the knee ampuation with chronic infections    Skilled nursing interventions to include:  Comment: home safety eval    Homebound evidenced status by:  Need the aid of supportive devices such as crutches, canes, wheelchairs or walkers. Leaving home must require a considerable and taxing effort. There must exist  a normal inability to leave the home.    Community Physician to provide follow up care: KAY Lopez     Optional Interventions    Wound information & treatment:    Home Infusion Therapy orders:    Line/Drain/Airway:    I certify the face to face encounter for this home care referral meets the CMS requirements and the encounter/clinical assessment with the patient was, in whole, or in part, for the medical condition(s) listed above, which is the primary reason for home health care. Based on my clinical findings: the service(s) are medically necessary, support the need for home health care, and the homebound criteria are met.  I certify that this patient has had a face to face encounter by myself.  Ramos Waldrop M.D. - NPI: [id number]    *Debility, frailty and advanced age in the absence of an acute deterioration or exacerbation of a condition do not qualify a patient for home health.

## 2021-05-10 NOTE — CARE PLAN
Problem: Safety  Goal: Will remain free from injury  Outcome: PROGRESSING AS EXPECTED  Goal: Will remain free from falls  Outcome: PROGRESSING AS EXPECTED     Problem: Infection  Goal: Will remain free from infection  Outcome: PROGRESSING AS EXPECTED     Problem: Venous Thromboembolism (VTW)/Deep Vein Thrombosis (DVT) Prevention:  Goal: Patient will participate in Venous Thrombosis (VTE)/Deep Vein Thrombosis (DVT)Prevention Measures  Outcome: PROGRESSING AS EXPECTED     Problem: Bowel/Gastric:  Goal: Normal bowel function is maintained or improved  Outcome: PROGRESSING AS EXPECTED  Goal: Will not experience complications related to bowel motility  Outcome: PROGRESSING AS EXPECTED     Problem: Knowledge Deficit  Goal: Knowledge of disease process/condition, treatment plan, diagnostic tests, and medications will improve  Outcome: PROGRESSING AS EXPECTED  Goal: Knowledge of the prescribed therapeutic regimen will improve  Outcome: PROGRESSING AS EXPECTED     Problem: Discharge Barriers/Planning  Goal: Patient's continuum of care needs will be met  Outcome: PROGRESSING AS EXPECTED     Problem: Pain Management  Goal: Pain level will decrease to patient's comfort goal  Outcome: PROGRESSING AS EXPECTED     Problem: Fluid Volume:  Goal: Will maintain balanced intake and output  Outcome: PROGRESSING AS EXPECTED     Problem: Respiratory:  Goal: Respiratory status will improve  Outcome: PROGRESSING AS EXPECTED     Problem: Skin Integrity  Goal: Risk for impaired skin integrity will decrease  Outcome: PROGRESSING AS EXPECTED     Problem: Communication  Goal: The ability to communicate needs accurately and effectively will improve  Outcome: MET

## 2021-05-10 NOTE — DISCHARGE PLANNING
Care Transition Team Discharge Planning    Anticipated Discharge Disposition: d/c to dtr's home in [place] w/ DME: FWW and tub transfer bench     Action: Lsw spoke to MD who will place DME orders as above.    Lsw met w/ pt and dtr at bedside. Pt will d/c to his dtr's home at  [address] [place] [address]  Dtr can be reached at [phone], Makayla Pham    Lsw acquired DME choice, and faxed to [facility].    Lsw provided following discussed resources:  [facility], [facility], [facility], and lists of local affordable DDS locations.      Barriers to Discharge: DME acceptance and delivery or set up for delivery     Plan: Lsw will continue to follow, and assist w/ d/c planning.

## 2021-05-11 ENCOUNTER — HOSPITAL ENCOUNTER (EMERGENCY)
Facility: MEDICAL CENTER | Age: 71
End: 2021-05-11
Attending: EMERGENCY MEDICINE
Payer: MEDICARE

## 2021-05-11 ENCOUNTER — APPOINTMENT (OUTPATIENT)
Dept: RADIOLOGY | Facility: MEDICAL CENTER | Age: 71
End: 2021-05-11
Attending: EMERGENCY MEDICINE
Payer: MEDICARE

## 2021-05-11 VITALS
TEMPERATURE: 97.6 F | OXYGEN SATURATION: 94 % | HEIGHT: 66 IN | HEART RATE: 64 BPM | BODY MASS INDEX: 38.57 KG/M2 | SYSTOLIC BLOOD PRESSURE: 120 MMHG | RESPIRATION RATE: 16 BRPM | WEIGHT: 240 LBS | DIASTOLIC BLOOD PRESSURE: 65 MMHG

## 2021-05-11 DIAGNOSIS — R60.9 PERIPHERAL EDEMA: ICD-10-CM

## 2021-05-11 LAB
ALBUMIN SERPL BCP-MCNC: 2.8 G/DL (ref 3.2–4.9)
ALBUMIN/GLOB SERPL: 0.7 G/DL
ALP SERPL-CCNC: 132 U/L (ref 30–99)
ALT SERPL-CCNC: 72 U/L (ref 2–50)
ANION GAP SERPL CALC-SCNC: 5 MMOL/L (ref 7–16)
ANISOCYTOSIS BLD QL SMEAR: ABNORMAL
AST SERPL-CCNC: 60 U/L (ref 12–45)
BACTERIA BLD CULT: NORMAL
BACTERIA BLD CULT: NORMAL
BASOPHILS # BLD AUTO: 0.9 % (ref 0–1.8)
BASOPHILS # BLD: 0.1 K/UL (ref 0–0.12)
BILIRUB SERPL-MCNC: 0.2 MG/DL (ref 0.1–1.5)
BLOOD CULTURE HOLD CXBCH: NORMAL
BUN SERPL-MCNC: 13 MG/DL (ref 8–22)
CALCIUM SERPL-MCNC: 8.9 MG/DL (ref 8.5–10.5)
CHLORIDE SERPL-SCNC: 101 MMOL/L (ref 96–112)
CO2 SERPL-SCNC: 25 MMOL/L (ref 20–33)
CREAT SERPL-MCNC: 1.02 MG/DL (ref 0.5–1.4)
EOSINOPHIL # BLD AUTO: 0.58 K/UL (ref 0–0.51)
EOSINOPHIL NFR BLD: 5.4 % (ref 0–6.9)
ERYTHROCYTE [DISTWIDTH] IN BLOOD BY AUTOMATED COUNT: 47.6 FL (ref 35.9–50)
GLOBULIN SER CALC-MCNC: 3.9 G/DL (ref 1.9–3.5)
GLUCOSE SERPL-MCNC: 143 MG/DL (ref 65–99)
HCT VFR BLD AUTO: 36.8 % (ref 42–52)
HGB BLD-MCNC: 12.2 G/DL (ref 14–18)
LYMPHOCYTES # BLD AUTO: 3.37 K/UL (ref 1–4.8)
LYMPHOCYTES NFR BLD: 31.5 % (ref 22–41)
MACROCYTES BLD QL SMEAR: ABNORMAL
MANUAL DIFF BLD: NORMAL
MCH RBC QN AUTO: 30 PG (ref 27–33)
MCHC RBC AUTO-ENTMCNC: 33.2 G/DL (ref 33.7–35.3)
MCV RBC AUTO: 90.4 FL (ref 81.4–97.8)
METAMYELOCYTES NFR BLD MANUAL: 0.9 %
MICROCYTES BLD QL SMEAR: ABNORMAL
MONOCYTES # BLD AUTO: 1.25 K/UL (ref 0–0.85)
MONOCYTES NFR BLD AUTO: 11.7 % (ref 0–13.4)
MORPHOLOGY BLD-IMP: NORMAL
MYELOCYTES NFR BLD MANUAL: 2.7 %
NEUTROPHILS # BLD AUTO: 5.02 K/UL (ref 1.82–7.42)
NEUTROPHILS NFR BLD: 46.9 % (ref 44–72)
NRBC # BLD AUTO: 0.03 K/UL
NRBC BLD-RTO: 0.3 /100 WBC
PLATELET # BLD AUTO: 304 K/UL (ref 164–446)
PLATELET BLD QL SMEAR: NORMAL
PMV BLD AUTO: 9.5 FL (ref 9–12.9)
POLYCHROMASIA BLD QL SMEAR: NORMAL
POTASSIUM SERPL-SCNC: 3.4 MMOL/L (ref 3.6–5.5)
PROT SERPL-MCNC: 6.7 G/DL (ref 6–8.2)
RBC # BLD AUTO: 4.07 M/UL (ref 4.7–6.1)
RBC BLD AUTO: PRESENT
SIGNIFICANT IND 70042: NORMAL
SIGNIFICANT IND 70042: NORMAL
SITE SITE: NORMAL
SITE SITE: NORMAL
SODIUM SERPL-SCNC: 131 MMOL/L (ref 135–145)
SOURCE SOURCE: NORMAL
SOURCE SOURCE: NORMAL
WBC # BLD AUTO: 10.7 K/UL (ref 4.8–10.8)

## 2021-05-11 PROCEDURE — 99285 EMERGENCY DEPT VISIT HI MDM: CPT

## 2021-05-11 PROCEDURE — 93971 EXTREMITY STUDY: CPT | Mod: RT

## 2021-05-11 ASSESSMENT — PAIN DESCRIPTION - PAIN TYPE: TYPE: ACUTE PAIN

## 2021-05-11 NOTE — ED NOTES
Rounded on patient. Patient resting in bed. No signs of distress noted. Equal chest rise and fall. No further needs at this time. Call light within reach. Family at bedside.

## 2021-05-11 NOTE — ED TRIAGE NOTES
Servando Durham    Chief Complaint   Patient presents with   • Leg Swelling     R leg swelling.        PT was D/C'd today for sepsis from an infection in his R leg. PT states it has just gotten significantly more swollen and painful since he got home today.     Vitals:    05/10/21 2221   BP: 150/84   Pulse: 83   Resp: 18   Temp: 37.2 °C (98.9 °F)   SpO2: 96%       PT placed back into the lobby and educated on the triage process. PT told to inform staff of any changes.

## 2021-05-11 NOTE — ED NOTES
Pt provided discharge instructions. Pt verbalized understanding. Pt leaving ER in stable condition.

## 2021-05-11 NOTE — ED NOTES
Patient to room from lobby in wheelchair.  Changed into gown, connected to monitor. Agree with triage note. Charted for ERP. Call light within reach. Family at bedside.

## 2021-05-11 NOTE — ED PROVIDER NOTES
ED Provider Note    CHIEF COMPLAINT  Chief Complaint   Patient presents with   • Leg Swelling     R leg swelling.        HPI  Servando Durham is a 70 y.o. male who presents for evaluation of worsening right lower extremity swelling and redness since he was discharged this afternoon.  Patient states he feels his leg is half again is big since he left.  He notes no new symptoms otherwise.  He does note that he has been sitting in a chair since his discharge and has not been able to elevate it appropriately.    REVIEW OF SYSTEMS  Constitutional: No fevers or chills  Skin: No new rashes  HEENT: No sore throat, or runny nose  Chest: No pain   Pulm: No shortness of breath, cough, wheezing, stridor, or pain with inspiration/expiration  Gastrointestinal: No nausea, vomiting, diarrhea, or abdominal pain.  Musculoskeletal: More swelling to right lower extremity.  More pain to right lower extremity.  Neurologic: No sensory or motor changes. No confusion or disorientation.  Heme: No bleeding or bruising problems.   Immuno: No hx of recurrent infections      PAST MEDICAL HISTORY   has a past medical history of Arthritis, Arthritis, Aspiration pneumonia (HCC) (2011), Backpain, Congestive heart failure (HCC), Daytime sleepiness, Diabetes, Diabetes (HCC), ETOH abuse, ETOHism (HCC), Fall, Glaucoma, Glaucoma, Hypertension, Indigestion, Seizure (HCC), Sleep apnea, and Wears glasses.    SOCIAL HISTORY  Social History     Tobacco Use   • Smoking status: Former Smoker     Packs/day: 0.25     Years: 35.00     Pack years: 8.75     Quit date: 2017     Years since quittin.0   • Smokeless tobacco: Never Used   Substance and Sexual Activity   • Alcohol use: No     Comment: not using since a year per pt    • Drug use: No     Comment:    • Sexual activity: Not on file       SURGICAL HISTORY   has a past surgical history that includes amputate thigh,thru femur; other; other orthopedic surgery; other (1970); and amputation low leg  "thru tib/fib.    CURRENT MEDICATIONS  Home Medications    **Home medications have not yet been reviewed for this encounter**         ALLERGIES  Allergies   Allergen Reactions   • Banana Swelling     Pt reports when bananas are consumed pt develops an itchy and swollen mouth and eyes water.         PHYSICAL EXAM  VITAL SIGNS: /65   Pulse 64   Temp 36.4 °C (97.6 °F) (Temporal)   Resp 16   Ht 1.676 m (5' 6\")   Wt 109 kg (240 lb)   SpO2 94%   BMI 38.74 kg/m²    Gen: Alert in no apparent distress.  HEENT: No signs of trauma, Bilateral external ears normal, Nose normal. Conjunctiva normal, Non-icteric.   Neck:  No tenderness, Supple, No masses  Lymphatic: No cervical lymphadenopathy noted.   Cardiovascular: Regular rate and rhythm, no murmurs.  Capillary refill less than 3 seconds to all extremities, 2+ distal pulses.  Thorax & Lungs: Normal breath sounds, No respiratory distress, No wheezing bilateral chest rise  Abdomen: Bowel sounds normal, Soft, No tenderness, No masses, No pulsatile masses. No Guarding or rebound  Skin: Warm, Dry, erythema as noted in picture below.  Back: No bony tenderness, No CVA tenderness.   Extremities: Intact distal pulses, edema noted to existing lower extremity.  Left AKA stump appears nonerythemic and nontender.  Neurologic: Alert , no facial droop, grossly normal coordination and strength  Psychiatric: Affect pleasant          LABS  Results for orders placed or performed during the hospital encounter of 05/11/21   CBC WITH DIFFERENTIAL   Result Value Ref Range    WBC 10.7 4.8 - 10.8 K/uL    RBC 4.07 (L) 4.70 - 6.10 M/uL    Hemoglobin 12.2 (L) 14.0 - 18.0 g/dL    Hematocrit 36.8 (L) 42.0 - 52.0 %    MCV 90.4 81.4 - 97.8 fL    MCH 30.0 27.0 - 33.0 pg    MCHC 33.2 (L) 33.7 - 35.3 g/dL    RDW 47.6 35.9 - 50.0 fL    Platelet Count 304 164 - 446 K/uL    MPV 9.5 9.0 - 12.9 fL    Neutrophils-Polys 46.90 44.00 - 72.00 %    Lymphocytes 31.50 22.00 - 41.00 %    Monocytes 11.70 0.00 - " 13.40 %    Eosinophils 5.40 0.00 - 6.90 %    Basophils 0.90 0.00 - 1.80 %    Nucleated RBC 0.30 /100 WBC    Neutrophils (Absolute) 5.02 1.82 - 7.42 K/uL    Lymphs (Absolute) 3.37 1.00 - 4.80 K/uL    Monos (Absolute) 1.25 (H) 0.00 - 0.85 K/uL    Eos (Absolute) 0.58 (H) 0.00 - 0.51 K/uL    Baso (Absolute) 0.10 0.00 - 0.12 K/uL    NRBC (Absolute) 0.03 K/uL    Anisocytosis 1+     Macrocytosis 1+     Microcytosis 1+    Comp Metabolic Panel   Result Value Ref Range    Sodium 131 (L) 135 - 145 mmol/L    Potassium 3.4 (L) 3.6 - 5.5 mmol/L    Chloride 101 96 - 112 mmol/L    Co2 25 20 - 33 mmol/L    Anion Gap 5.0 (L) 7.0 - 16.0    Glucose 143 (H) 65 - 99 mg/dL    Bun 13 8 - 22 mg/dL    Creatinine 1.02 0.50 - 1.40 mg/dL    Calcium 8.9 8.5 - 10.5 mg/dL    AST(SGOT) 60 (H) 12 - 45 U/L    ALT(SGPT) 72 (H) 2 - 50 U/L    Alkaline Phosphatase 132 (H) 30 - 99 U/L    Total Bilirubin 0.2 0.1 - 1.5 mg/dL    Albumin 2.8 (L) 3.2 - 4.9 g/dL    Total Protein 6.7 6.0 - 8.2 g/dL    Globulin 3.9 (H) 1.9 - 3.5 g/dL    A-G Ratio 0.7 g/dL   LACTIC ACID   Result Value Ref Range    Lactic Acid 1.4 0.5 - 2.0 mmol/L   DIFFERENTIAL MANUAL   Result Value Ref Range    Metamyelocytes 0.90 %    Myelocytes 2.70 %    Manual Diff Status PERFORMED    PERIPHERAL SMEAR REVIEW   Result Value Ref Range    Peripheral Smear Review see below    PLATELET ESTIMATE   Result Value Ref Range    Plt Estimation Normal    MORPHOLOGY   Result Value Ref Range    RBC Morphology Present     Polychromia 1+    ESTIMATED GFR   Result Value Ref Range    GFR If African American >60 >60 mL/min/1.73 m 2    GFR If Non African American >60 >60 mL/min/1.73 m 2   Blood Culture,Hold   Result Value Ref Range    Blood Culture Hold Collected        RADIOLOGY  US-EXTREMITY VENOUS LOWER UNILAT RIGHT   Final Result          COURSE & MEDICAL DECISION MAKING  Patient arrives for evaluation of right lower extremity pain, swelling, and redness which he states is much worse than when he was  discharged earlier today.  Patient was noted to have been in the hospital for cellulitis to that extremity.  He was felt to be improving and was no longer septic today and was thus discharged.  He states that it has gotten markedly worse but he has had no other symptoms.  I discussed options with the patient, in addition to laboratory evaluation, I will obtain an ultrasound to look for DVT.  Patient does not appear septic or toxic and is interactive.  It is unclear whether he will meet criteria for readmission however.  3:20 AM  Discussed the results with the patient and his daughter who is at bedside.  At this point I do not feel there is any need for readmission as the patient can tolerate his oral antibiotics and seems to be improving in terms of his labs.  Is notable that his leukocytosis has resolved but he does have a mild elevation in his transaminases.  This is likely nonspecific and could be related to medication, but I do not feel further imaging or inpatient evaluation is necessary.  He did state understanding of the plan to have him elevate his leg as much as possible while at home and to continue his current medication regimen.  He stated clear understanding that if his symptoms worsen or change he would need to return for reevaluation.  He was comfortable with the plan for discharge.    FINAL IMPRESSION  1. Peripheral edema        Electronically signed by: Kin Vogt M.D., 5/11/2021 1:42 AM

## 2021-05-17 ENCOUNTER — PATIENT OUTREACH (OUTPATIENT)
Dept: HEALTH INFORMATION MANAGEMENT | Facility: OTHER | Age: 71
End: 2021-05-17

## 2021-05-17 NOTE — PROGRESS NOTES
Community Health Worker Trevor spoke to the patient and his daughter. Patient reports that he is staying with his daughter in her home, daughter provides transportation, and has no food barriers. Daughter states that he has a PCP appointment today, 5/17/2021 with KAY Lopez where they will discuss obtaining medical equipment such as a shower chair.   CHW provided contact information.     Community Health Worker Intake  • Social determinates of health intake complete.   • Identified no barriers   • Contact information provided to Servando Durham   • Has PCP appointment scheduled for 5/17/2021  • Outpatient assessment completed.  • Did the patient receive medications post discharge: Yes    Plan: Patient has no needs. CHW will remove the patient from CCM caseload.

## 2021-06-04 NOTE — DOCUMENTATION QUERY
Critical access hospital                                                                       Query Response Note      PATIENT:               DANIEL MAGANA  ACCT #:                  [id number]  MRN:                     [id number]  :                      1950  ADMIT DATE:       2021 1:15 AM  DISCH DATE:        5/10/2021 3:55 PM  RESPONDING  PROVIDER #:        [id number]           QUERY TEXT:    Please clarify the following:    NOTE-  If an appropriate response is not listed below, please respond with a new note.    Thank you,    [email or web address]          The patient's clinical indicators include:  pt admitted  3:45am    ED:  BP 96/66  H&P: 107/54    PN  9:45am  Update around 14:19: RRT was called due to hypotension 73/30 (with T 100.8),    The patient is critically ill, with high chance of deterioration into septic shock    Principal Problem (Resolved):        Septic shock (HCC) POA: Yes    Treatment: IV antibiotics   Infected amputation stump, cellulitis, CKD, LINDA, KEITH    Risk:  Death  Options provided:   -- Septic Shock present on admission   -- Septic Shock developed after admission   -- unable to determine      Query created by: Nolberto Ruth on 2021 4:54 PM    RESPONSE TEXT:    Septic Shock present on admission          Electronically signed by:  KAYLEY ASH MD 6/3/2021 9:22 PM

## 2021-08-07 ENCOUNTER — HOSPITAL ENCOUNTER (EMERGENCY)
Facility: MEDICAL CENTER | Age: 71
End: 2021-08-07
Attending: EMERGENCY MEDICINE
Payer: MEDICARE

## 2021-08-07 VITALS
HEART RATE: 60 BPM | OXYGEN SATURATION: 96 % | SYSTOLIC BLOOD PRESSURE: 127 MMHG | DIASTOLIC BLOOD PRESSURE: 64 MMHG | HEIGHT: 66 IN | BODY MASS INDEX: 39.7 KG/M2 | TEMPERATURE: 98.6 F | RESPIRATION RATE: 19 BRPM | WEIGHT: 247 LBS

## 2021-08-07 DIAGNOSIS — L02.91 ABSCESS: ICD-10-CM

## 2021-08-07 PROCEDURE — 700101 HCHG RX REV CODE 250: Performed by: EMERGENCY MEDICINE

## 2021-08-07 PROCEDURE — 700102 HCHG RX REV CODE 250 W/ 637 OVERRIDE(OP): Performed by: EMERGENCY MEDICINE

## 2021-08-07 PROCEDURE — A9270 NON-COVERED ITEM OR SERVICE: HCPCS | Performed by: EMERGENCY MEDICINE

## 2021-08-07 PROCEDURE — 90715 TDAP VACCINE 7 YRS/> IM: CPT | Performed by: EMERGENCY MEDICINE

## 2021-08-07 PROCEDURE — 700111 HCHG RX REV CODE 636 W/ 250 OVERRIDE (IP): Performed by: EMERGENCY MEDICINE

## 2021-08-07 PROCEDURE — 99283 EMERGENCY DEPT VISIT LOW MDM: CPT

## 2021-08-07 PROCEDURE — 90471 IMMUNIZATION ADMIN: CPT

## 2021-08-07 PROCEDURE — 303977 HCHG I & D

## 2021-08-07 RX ORDER — SULFAMETHOXAZOLE AND TRIMETHOPRIM 800; 160 MG/1; MG/1
1 TABLET ORAL EVERY 12 HOURS
Qty: 14 TABLET | Refills: 0 | Status: SHIPPED | OUTPATIENT
Start: 2021-08-07 | End: 2021-08-14

## 2021-08-07 RX ORDER — OXYCODONE HYDROCHLORIDE AND ACETAMINOPHEN 5; 325 MG/1; MG/1
1 TABLET ORAL ONCE
Status: COMPLETED | OUTPATIENT
Start: 2021-08-07 | End: 2021-08-07

## 2021-08-07 RX ORDER — LIDOCAINE HYDROCHLORIDE AND EPINEPHRINE 10; 10 MG/ML; UG/ML
20 INJECTION, SOLUTION INFILTRATION; PERINEURAL ONCE
Status: COMPLETED | OUTPATIENT
Start: 2021-08-07 | End: 2021-08-07

## 2021-08-07 RX ADMIN — LIDOCAINE HYDROCHLORIDE AND EPINEPHRINE 20 ML: 10; 10 INJECTION, SOLUTION INFILTRATION; PERINEURAL at 14:24

## 2021-08-07 RX ADMIN — OXYCODONE HYDROCHLORIDE AND ACETAMINOPHEN 1 TABLET: 5; 325 TABLET ORAL at 14:24

## 2021-08-07 RX ADMIN — CLOSTRIDIUM TETANI TOXOID ANTIGEN (FORMALDEHYDE INACTIVATED), CORYNEBACTERIUM DIPHTHERIAE TOXOID ANTIGEN (FORMALDEHYDE INACTIVATED), BORDETELLA PERTUSSIS TOXOID ANTIGEN (GLUTARALDEHYDE INACTIVATED), BORDETELLA PERTUSSIS FILAMENTOUS HEMAGGLUTININ ANTIGEN (FORMALDEHYDE INACTIVATED), BORDETELLA PERTUSSIS PERTACTIN ANTIGEN, AND BORDETELLA PERTUSSIS FIMBRIAE 2/3 ANTIGEN 0.5 ML: 5; 2; 2.5; 5; 3; 5 INJECTION, SUSPENSION INTRAMUSCULAR at 15:14

## 2021-08-07 ASSESSMENT — FIBROSIS 4 INDEX: FIB4 SCORE: 1.6

## 2021-08-07 NOTE — ED PROVIDER NOTES
"ED Provider Note    CHIEF COMPLAINT  Chief Complaint   Patient presents with   • Abscess     Seen at 2:10 PM    HPI  Servando Durham is a 71 y.o. male with history of prediabetes who presents a painful abscess in the right posterior thigh.  The patient has had these in the past.  He does not smoke.  He has had some mild nausea, subjective fevers and chills since the abscess started 2 days ago.  He denies any vomiting, chest pain, shortness of breath, numbness or focal weakness.    REVIEW OF SYSTEMS  See HPI, remainder of review of systems negative.  PAST MEDICAL HISTORY   has a past medical history of Arthritis, Arthritis, Aspiration pneumonia (HCC) (2011), Backpain, Congestive heart failure (HCC), Daytime sleepiness, Diabetes, Diabetes (HCC), ETOH abuse, ETOHism (HCC), Fall, Glaucoma, Glaucoma, Hypertension, Indigestion, Seizure (HCC), Sleep apnea, and Wears glasses.    SOCIAL HISTORY  Social History     Tobacco Use   • Smoking status: Former Smoker     Packs/day: 0.25     Years: 35.00     Pack years: 8.75     Quit date: 2017     Years since quittin.3   • Smokeless tobacco: Never Used   Vaping Use   • Vaping Use: Never used   Substance and Sexual Activity   • Alcohol use: No     Comment: not using since a year per pt    • Drug use: No     Comment:    • Sexual activity: Not on file       SURGICAL HISTORY   has a past surgical history that includes amputate thigh,thru femur; other; other orthopedic surgery; other (); and amputation low leg thru tib/fib.    CURRENT MEDICATIONS  Reviewed.  See Encounter Summary.     ALLERGIES  Allergies   Allergen Reactions   • Banana Swelling     Pt reports when bananas are consumed pt develops an itchy and swollen mouth and eyes water.         PHYSICAL EXAM  VITAL SIGNS: /64   Pulse 60   Temp 37 °C (98.6 °F) (Temporal)   Resp 19   Ht 1.676 m (5' 6\")   Wt 112 kg (247 lb)   SpO2 96%   BMI 39.87 kg/m²   Constitutional: Awake, alert in no apparent " distress.  HENT: Normocephalic, Bilateral external ears normal. Nose normal.   Eyes: Conjunctiva normal, non-icteric, EOMI.    Thorax & Lungs: Easy unlabored respirations  Cardiovascular: Regular rate and rhythm  Abdomen:  No distention  Skin: Visualized skin is  Dry, No erythema, No rash.  There is fluctuance over the right posterior proximal leg and some tenderness, consistent with abscess.  Extremities:   atraumatic   Neurologic: Alert, Grossly non-focal.   Psychiatric: Affect and Mood normal  No inguinal LAD  RADIOLOGY  No orders to display       Nursing notes and vital signs were reviewed. (See chart for details)    Procedure note: The abscess was anesthetized with 1% lidocaine with epinephrine, following this I made a stab incision with an 11 blade.  Copious necrotic blood and purulence was expressed, this was tolerated very well without complications.  Following this I packed the area with 1 inch plain packing strip.  Decision Making:  This is a pleasant 71 y.o. year old male who presents with fairly large abscess in the right thigh.  It was drained as above.  He is hemodynamically stable, no concerns of necrotizing fasciitis, the patient had minimal pain in this area.  He will be placed on Bactrim, I recommend removing the packing in about 48 hours.  He is return for any fevers, lightheadedness, worsening pain or any other concern.  DISPOSITION:  Patient will be discharged home in good condition.    Discharge Medications:  New Prescriptions    SULFAMETHOXAZOLE-TRIMETHOPRIM (BACTRIM DS) 800-160 MG TABLET    Take 1 tablet by mouth every 12 hours for 7 days.       The patient was discharged home (see d/c instructions) and told to return immediately for any signs or symptoms listed, or any worsening at all.  The patient verbally agreed to the discharge precautions and follow-up plan which is documented in EPIC.        FINAL IMPRESSION  1. Abscess

## 2021-09-27 ENCOUNTER — HOSPITAL ENCOUNTER (EMERGENCY)
Facility: MEDICAL CENTER | Age: 71
End: 2021-09-27
Attending: EMERGENCY MEDICINE
Payer: MEDICARE

## 2021-09-27 ENCOUNTER — APPOINTMENT (OUTPATIENT)
Dept: RADIOLOGY | Facility: MEDICAL CENTER | Age: 71
End: 2021-09-27
Attending: EMERGENCY MEDICINE
Payer: MEDICARE

## 2021-09-27 VITALS
TEMPERATURE: 97.4 F | DIASTOLIC BLOOD PRESSURE: 90 MMHG | SYSTOLIC BLOOD PRESSURE: 147 MMHG | RESPIRATION RATE: 18 BRPM | OXYGEN SATURATION: 94 % | HEART RATE: 70 BPM

## 2021-09-27 DIAGNOSIS — R56.9 SEIZURE (HCC): ICD-10-CM

## 2021-09-27 DIAGNOSIS — S09.90XA CLOSED HEAD INJURY, INITIAL ENCOUNTER: ICD-10-CM
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
## 2021-09-27 LAB
ALBUMIN SERPL BCP-MCNC: 3.2 G/DL (ref 3.2–4.9)
ALBUMIN/GLOB SERPL: 1.3 G/DL
ALP SERPL-CCNC: 64 U/L (ref 30–99)
ALT SERPL-CCNC: 16 U/L (ref 2–50)
ANION GAP SERPL CALC-SCNC: 11 MMOL/L (ref 7–16)
APTT PPP: 31.6 SEC (ref 24.7–36)
AST SERPL-CCNC: 24 U/L (ref 12–45)
BASOPHILS # BLD AUTO: 0.9 % (ref 0–1.8)
BASOPHILS # BLD: 0.07 K/UL (ref 0–0.12)
BILIRUB SERPL-MCNC: 0.2 MG/DL (ref 0.1–1.5)
BUN SERPL-MCNC: 7 MG/DL (ref 8–22)
CALCIUM SERPL-MCNC: 7.3 MG/DL (ref 8.5–10.5)
CHLORIDE SERPL-SCNC: 114 MMOL/L (ref 96–112)
CO2 SERPL-SCNC: 18 MMOL/L (ref 20–33)
COMMENT 1642: NORMAL
CREAT SERPL-MCNC: 0.76 MG/DL (ref 0.5–1.4)
EOSINOPHIL # BLD AUTO: 0.53 K/UL (ref 0–0.51)
EOSINOPHIL NFR BLD: 7.2 % (ref 0–6.9)
ERYTHROCYTE [DISTWIDTH] IN BLOOD BY AUTOMATED COUNT: 45.5 FL (ref 35.9–50)
ETHANOL BLD-MCNC: 152.4 MG/DL (ref 0–10)
GLOBULIN SER CALC-MCNC: 2.4 G/DL (ref 1.9–3.5)
GLUCOSE BLD-MCNC: 115 MG/DL (ref 65–99)
GLUCOSE SERPL-MCNC: 77 MG/DL (ref 65–99)
HCT VFR BLD AUTO: 50.8 % (ref 42–52)
HGB BLD-MCNC: 17 G/DL (ref 14–18)
IMM GRANULOCYTES # BLD AUTO: 0.02 K/UL (ref 0–0.11)
IMM GRANULOCYTES NFR BLD AUTO: 0.3 % (ref 0–0.9)
INR PPP: 1.02 (ref 0.87–1.13)
LYMPHOCYTES # BLD AUTO: 4.01 K/UL (ref 1–4.8)
LYMPHOCYTES NFR BLD: 54.1 % (ref 22–41)
MCH RBC QN AUTO: 29.4 PG (ref 27–33)
MCHC RBC AUTO-ENTMCNC: 33.5 G/DL (ref 33.7–35.3)
MCV RBC AUTO: 87.7 FL (ref 81.4–97.8)
MONOCYTES # BLD AUTO: 0.38 K/UL (ref 0–0.85)
MONOCYTES NFR BLD AUTO: 5.1 % (ref 0–13.4)
MORPHOLOGY BLD-IMP: NORMAL
NEUTROPHILS # BLD AUTO: 2.4 K/UL (ref 1.82–7.42)
NEUTROPHILS NFR BLD: 32.4 % (ref 44–72)
NRBC # BLD AUTO: 0 K/UL
NRBC BLD-RTO: 0 /100 WBC
PLATELET # BLD AUTO: 245 K/UL (ref 164–446)
PMV BLD AUTO: 10.4 FL (ref 9–12.9)
POTASSIUM SERPL-SCNC: 3.1 MMOL/L (ref 3.6–5.5)
PROT SERPL-MCNC: 5.6 G/DL (ref 6–8.2)
PROTHROMBIN TIME: 13.1 SEC (ref 12–14.6)
RBC # BLD AUTO: 5.79 M/UL (ref 4.7–6.1)
SODIUM SERPL-SCNC: 143 MMOL/L (ref 135–145)
WBC # BLD AUTO: 7.4 K/UL (ref 4.8–10.8)

## 2021-09-27 PROCEDURE — 70450 CT HEAD/BRAIN W/O DYE: CPT | Mod: ME

## 2021-09-27 PROCEDURE — 85025 COMPLETE CBC W/AUTO DIFF WBC: CPT

## 2021-09-27 PROCEDURE — 85610 PROTHROMBIN TIME: CPT

## 2021-09-27 PROCEDURE — 85730 THROMBOPLASTIN TIME PARTIAL: CPT

## 2021-09-27 PROCEDURE — 80053 COMPREHEN METABOLIC PANEL: CPT

## 2021-09-27 PROCEDURE — 99285 EMERGENCY DEPT VISIT HI MDM: CPT

## 2021-09-27 PROCEDURE — 700102 HCHG RX REV CODE 250 W/ 637 OVERRIDE(OP): Performed by: EMERGENCY MEDICINE

## 2021-09-27 PROCEDURE — 82962 GLUCOSE BLOOD TEST: CPT

## 2021-09-27 PROCEDURE — 82077 ASSAY SPEC XCP UR&BREATH IA: CPT

## 2021-09-27 PROCEDURE — A9270 NON-COVERED ITEM OR SERVICE: HCPCS | Performed by: EMERGENCY MEDICINE

## 2021-09-27 PROCEDURE — 96374 THER/PROPH/DIAG INJ IV PUSH: CPT

## 2021-09-27 PROCEDURE — 36415 COLL VENOUS BLD VENIPUNCTURE: CPT

## 2021-09-27 RX ORDER — POTASSIUM CHLORIDE 20 MEQ/1
40 TABLET, EXTENDED RELEASE ORAL ONCE
Status: COMPLETED | OUTPATIENT
Start: 2021-09-27 | End: 2021-09-27

## 2021-09-27 RX ADMIN — POTASSIUM CHLORIDE 40 MEQ: 1500 TABLET, EXTENDED RELEASE ORAL at 10:00

## 2021-09-27 NOTE — ED NOTES
Alerted SW that pt is not eligible for transportation via [facility]TM as he lives in [place] (per [employer] cab company.)  Pt states he has no available family or friends to help him get home.

## 2021-09-27 NOTE — ED PROVIDER NOTES
ED Provider Note    CHIEF COMPLAINT  Chief Complaint   Patient presents with   • ALOC     ETOH, GCS x 14 off for confusion, a/o x 1 to self.    • Seizure     x 3 episodes of 20seconds each.    • GLF     post dizziness, hit L side of head, no trauma noted to head.        HPI  Servando Durham is a 71 y.o. male who presents to the emergency department by ambulance from home after a fall.  Patient states he drink too much alcohol, all and stumbled into the wall, then fell to the ground.  He did have a seizure after this.  He does have history of seizure, per medics are familiar with this patient, seizures are most common with alcohol withdrawal or infection.  He does not have epilepsy.  He does not take antiepileptic medication.  Patient had witnessed tonic-clonic seizure for EMS lasting approximately 20 seconds.  He was postictal thereafter but is more responsive prior to arrival.  No new focal deficit, left lower extremity amputation as well.    Patient admits to drinking alcohol last night.  Denies any concern for wound or stump infection.  Denies fever.    REVIEW OF SYSTEMS  See HPI for further details. All other systems are negative.     PAST MEDICAL HISTORY   has a past medical history of Arthritis, Arthritis, Aspiration pneumonia (HCC) (2011), Backpain, Congestive heart failure (HCC), Daytime sleepiness, Diabetes, Diabetes (HCC), ETOH abuse, ETOHism (HCC), Fall, Glaucoma, Glaucoma, Hypertension, Indigestion, Seizure (HCC), Sleep apnea, and Wears glasses.    SOCIAL HISTORY  Social History     Tobacco Use   • Smoking status: Former Smoker     Packs/day: 0.25     Years: 35.00     Pack years: 8.75     Quit date: 2017     Years since quittin.4   • Smokeless tobacco: Never Used   Vaping Use   • Vaping Use: Never used   Substance and Sexual Activity   • Alcohol use: No     Comment: not using since a year per pt    • Drug use: No     Comment: [facility]   • Sexual activity: Not on file       SURGICAL HISTORY   has  a past surgical history that includes amputate thigh,thru femur; other; other orthopedic surgery; other (1970); and amputation low leg thru tib/fib.    CURRENT MEDICATIONS  Home Medications    **Home medications have not yet been reviewed for this encounter**         ALLERGIES  Allergies   Allergen Reactions   • Banana Swelling     Pt reports when bananas are consumed pt develops an itchy and swollen mouth and eyes water.         PHYSICAL EXAM  VITAL SIGNS: /64   Pulse 68   Temp 35.8 °C (96.5 °F) (Temporal)   Resp (!) 29   SpO2 97%   Pulse ox interpretation: I interpret this pulse ox as normal.  Constitutional: Alert in no apparent distress.  HENT: Normocephalic, atraumatic, no cephalohematoma. Bilateral external ears normal, Nose normal. Moist mucous membranes.  No oral trauma.  Eyes: Pupils are equal and reactive, Conjunctiva normal.   Neck: Normal range of motion, Supple  Lymphatic: No lymphadenopathy noted.   Cardiovascular: Regular rate and rhythm, no murmurs. Distal pulses intact.   Thorax & Lungs: Normal breath sounds.  No wheezing/rales/ronchi. No increased work of breathing  Abdomen: Soft, non-distended, non-tender to palpation.   Skin: Warm, Dry, No erythema, No rash.   Musculoskeletal:  No major deformities noted.  Lower extremities, buttocks viewed with out decubitus lesion or ulcer.  Stump is intact without skin breakdown.  Neurologic: Alert and oriented x3.  Speech is clear.  Moves extremities spontaneously.  Psychiatric: Flat affect.  Cooperative.    DIAGNOSTIC STUDIES / PROCEDURES      LABS  Results for orders placed or performed during the hospital encounter of 09/27/21   CBC WITH DIFFERENTIAL   Result Value Ref Range    WBC 7.4 4.8 - 10.8 K/uL    RBC 5.79 4.70 - 6.10 M/uL    Hemoglobin 17.0 14.0 - 18.0 g/dL    Hematocrit 50.8 42.0 - 52.0 %    MCV 87.7 81.4 - 97.8 fL    MCH 29.4 27.0 - 33.0 pg    MCHC 33.5 (L) 33.7 - 35.3 g/dL    RDW 45.5 35.9 - 50.0 fL    Platelet Count 245 164 - 446 K/uL     MPV 10.4 9.0 - 12.9 fL    Neutrophils-Polys 32.40 (L) 44.00 - 72.00 %    Lymphocytes 54.10 (H) 22.00 - 41.00 %    Monocytes 5.10 0.00 - 13.40 %    Eosinophils 7.20 (H) 0.00 - 6.90 %    Basophils 0.90 0.00 - 1.80 %    Immature Granulocytes 0.30 0.00 - 0.90 %    Nucleated RBC 0.00 /100 WBC    Neutrophils (Absolute) 2.40 1.82 - 7.42 K/uL    Lymphs (Absolute) 4.01 1.00 - 4.80 K/uL    Monos (Absolute) 0.38 0.00 - 0.85 K/uL    Eos (Absolute) 0.53 (H) 0.00 - 0.51 K/uL    Baso (Absolute) 0.07 0.00 - 0.12 K/uL    Immature Granulocytes (abs) 0.02 0.00 - 0.11 K/uL    NRBC (Absolute) 0.00 K/uL   PROTHROMBIN TIME   Result Value Ref Range    PT 13.1 12.0 - 14.6 sec    INR 1.02 0.87 - 1.13   APTT   Result Value Ref Range    APTT 31.6 24.7 - 36.0 sec   Comp Metabolic Panel   Result Value Ref Range    Sodium 143 135 - 145 mmol/L    Potassium 3.1 (L) 3.6 - 5.5 mmol/L    Chloride 114 (H) 96 - 112 mmol/L    Co2 18 (L) 20 - 33 mmol/L    Anion Gap 11.0 7.0 - 16.0    Glucose 77 65 - 99 mg/dL    Bun 7 (L) 8 - 22 mg/dL    Creatinine 0.76 0.50 - 1.40 mg/dL    Calcium 7.3 (L) 8.5 - 10.5 mg/dL    AST(SGOT) 24 12 - 45 U/L    ALT(SGPT) 16 2 - 50 U/L    Alkaline Phosphatase 64 30 - 99 U/L    Total Bilirubin 0.2 0.1 - 1.5 mg/dL    Albumin 3.2 3.2 - 4.9 g/dL    Total Protein 5.6 (L) 6.0 - 8.2 g/dL    Globulin 2.4 1.9 - 3.5 g/dL    A-G Ratio 1.3 g/dL   DIAGNOSTIC ALCOHOL   Result Value Ref Range    Diagnostic Alcohol 152.4 (H) 0.0 - 10.0 mg/dL   PERIPHERAL SMEAR REVIEW   Result Value Ref Range    Peripheral Smear Review see below    DIFFERENTIAL COMMENT   Result Value Ref Range    Comments-Diff see below    ESTIMATED GFR   Result Value Ref Range    GFR If African American >60 >60 mL/min/1.73 m 2    GFR If Non African American >60 >60 mL/min/1.73 m 2   POCT glucose device results   Result Value Ref Range    Glucose - Accu-Ck 115 (H) 65 - 99 mg/dL       RADIOLOGY  CT-HEAD W/O   Final Result      Head CT without contrast within normal limits. No  evidence of acute cerebral infarction, hemorrhage or mass lesion.              COURSE & MEDICAL DECISION MAKING  Nursing notes and vital signs were reviewed. (See chart for details)  The patients records were reviewed, history was obtained from the patient;     ED evaluation for fall, seizure, head injury (although order of events is not entirely known) is really unrevealing.  Patient witnessed seizure for EMS, postictal thereafter but is returned to baseline.  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  No skin or stump infection.  Neurologically intact otherwise.  Labs are really quite unremarkable, no leukocytosis, left shift or bandemia.  Mild hypokalemia, potassium 3.1, CO2 18 without other electrolyte derangement.  Tolerated 40 mEq of K-Dur and water without difficulty.  Diet modifications recommended.  CT head without evidence for trauma, mass or hemorrhage.  Hemodynamically stable.    Suspect incident secondary to his reported alcohol use.  There is no clinical evidence for withdrawal at this time.  Tolerates oral fluids, medications and lunch box without difficulty.  Plans to use his Medicaid to return home by [facility].    Patient is stable for discharge at this time, anticipatory guidance provided, continue home medications, encouraged not drink alcohol to excess., close follow-up is encouraged, and strict ED return instructions have been detailed. Patient is agreeable to the disposition and plan.    Patient's blood pressure was elevated in the emergency department, and has been referred to primary care for close monitoring.      FINAL IMPRESSION  (W19.XXXA) Fall, initial encounter  (R56.9) Seizure (HCC)  (S09.90XA) Closed head injury, initial encounter      Electronically signed by: Anamika Philip D.O., 9/27/2021 10:27 AM      This dictation was created using voice recognition software. The accuracy of the dictation is limited to the abilities of the software. I expect there may be some  errors of grammar and possibly content. The nursing notes were reviewed and certain aspects of this information were incorporated into this note.

## 2021-09-27 NOTE — ED NOTES
Pt discharged with FWW and assistance by social work to cab waiting outside to take him back home.

## 2021-09-27 NOTE — PROGRESS NOTES
Provided discharge education to patient patient verbalizes understanding. Waiting for Walker delivery to bedside and patient to be discharged via  MTM.

## 2021-09-27 NOTE — DISCHARGE PLANNING
Anticipated Discharge Disposition: Home with outpatient follow up per AVS    Action:   · Discussed pt's POC and DC plan with ALYSSA Ortega RN.  Per Teena, [facility] will not provided transportation to pt's residence in [place], [place].   · Discussed pt's POC with Tab ORNELAS RN. Per Tab, he will order a cane with traction.   · Spoke to the pt at bedside. Obtained phone from pt for charging in CM office. Provided update regarding transport with [facility].   · Called [facility]TM to arrange . Per DONNA Rodriguez rep, pt is eligible for transport to [place] and Narendra Dickinson RN, was provided with incorrect information from previous [facility] rep. Per Jennifer, she will report this to her supervisor. Jennifer is attempting to obtain a ride for the pt.     Barriers to Discharge:   · Pending delivery of cane  · Pending [facility]  ETA and verification of ride    Plan:   · RN CM follow up with MTM and update pt and Teena. Continue to collaborate with the pt, pt's family, and health care team to provide social and discharge support as needed.      Addendum:    1211: Called ALYSSA Dickinson RN, via Voalte and provided an update.     1248: Spoke with DONNA Rodriguez. Per Jennifer [facility] can provide transport to pt's home with a  time of 1700. Pt states he does not want to wait in the ED until 1700. Pt states he prefers transport to [address] [place] [id number]. Jennifer states MTM pick time estimated at 1315. ALYSSA RN, Vicki VALLE, notified  via Vo[employer]te text. Pt updated at bedside. RN CM returned pt's charged cell phone at bedside.     1318: MD states pt needs dc with FWW. Obtained FWW, delivered to pt at bedside. Accompanied pt with FWW to [employer] cab with pt's belongings.

## 2021-09-27 NOTE — ED NOTES
to assist in finding pt a ride home.  CM also charging pt's phone.  PT resting in [facility], appears comfortable, NAD.

## 2021-09-27 NOTE — PROGRESS NOTES
Attention order for cane. At this time we are comp;ete;y out of supply for canes. Informed vendor they stated they are on back order, recommend getting from [employer] or [employer] or [employer].

## 2021-09-27 NOTE — ED NOTES
PT able to dress himself with one person assist.  Attempting to sort out transportation needs for pt to return home.

## 2021-09-27 NOTE — PROGRESS NOTES
Report received from Teena HENDERSON. Patient resting in hallway. Assisted to restroom x1 assist. Waiting for cane to be delivered to bedside.

## 2021-09-27 NOTE — DISCHARGE INSTRUCTIONS
Follow-up with primary care this week for reevaluation, medication management.    Continue home medications as previously indicated.    Do not drink alcohol to excess. Encourage substance abuse counseling or detox treatment if warranted.    Return emergency department for recurrent or intractable seizure, altered mental status, focal weakness, vomiting, hallucination or other new concerns.

## 2021-09-27 NOTE — ED TRIAGE NOTES
Chief Complaint   Patient presents with   • ALOC     ETOH, GCS x 14 off for confusion, a/o x 1 to self.    • Seizure     x 3 episodes of 20seconds each.    • GLF     post dizziness, hit L side of head, no trauma noted to head.      Pt BIB EMS for GLF post dizziness where pt struck L side of head, no trauma noted to head, GCS 14, a/o x 1 to self. Per EMS pt had 3 witnessed seizures lasting up to 20 seconds. Per pt family pt has a hx of seizures when drinking ETOH or if pt has an active infection. Pt was drinking ETOH per EMS.     Dr. Philip assessed pt prior to going to [facility].     /71   Pulse 81   Temp 35.8 °C (96.5 °F) (Temporal)   Resp 18   SpO2 91%

## 2021-09-27 NOTE — ED NOTES
"Pt Ox2, still confused to event of last night and uncertain of year, but states \"I don't really need to know the year.\".  PRovided urinal.  Awaiting CT results.    Pt able to tell RN he uses a cane for mobilization and that he plans on calling [facility] for transportation home if he gets discharged today.  "

## 2022-03-30 NOTE — ED NOTES
Chief Complaint   Patient presents with   • Sent by MD     sent here for positive BC from yesterday. was seen for right gluteal cyst. was told by pcp to get evaluated and to get blood work done.    • Cough     c/o coughing up whitish sputum yesterday. states has hx of pneumonia     Speaking in full sentences. Placed in respiratory lounge room.           English

## 2022-07-18 ENCOUNTER — APPOINTMENT (OUTPATIENT)
Dept: RADIOLOGY | Facility: MEDICAL CENTER | Age: 72
End: 2022-07-18
Attending: EMERGENCY MEDICINE
Payer: MEDICARE

## 2022-07-18 ENCOUNTER — HOSPITAL ENCOUNTER (EMERGENCY)
Facility: MEDICAL CENTER | Age: 72
End: 2022-07-18
Attending: EMERGENCY MEDICINE
Payer: MEDICARE

## 2022-07-18 VITALS
BODY MASS INDEX: 40.18 KG/M2 | SYSTOLIC BLOOD PRESSURE: 138 MMHG | WEIGHT: 250 LBS | RESPIRATION RATE: 16 BRPM | HEIGHT: 66 IN | HEART RATE: 60 BPM | DIASTOLIC BLOOD PRESSURE: 75 MMHG | TEMPERATURE: 98.2 F | OXYGEN SATURATION: 94 %

## 2022-07-18 DIAGNOSIS — M79.671 FOOT PAIN, RIGHT: ICD-10-CM

## 2022-07-18 LAB
ALBUMIN SERPL BCP-MCNC: 3.8 G/DL (ref 3.2–4.9)
ALBUMIN/GLOB SERPL: 1.3 G/DL
ALP SERPL-CCNC: 93 U/L (ref 30–99)
ALT SERPL-CCNC: 26 U/L (ref 2–50)
ANION GAP SERPL CALC-SCNC: 10 MMOL/L (ref 7–16)
AST SERPL-CCNC: 20 U/L (ref 12–45)
BASOPHILS # BLD AUTO: 1.4 % (ref 0–1.8)
BASOPHILS # BLD: 0.09 K/UL (ref 0–0.12)
BILIRUB SERPL-MCNC: 0.4 MG/DL (ref 0.1–1.5)
BUN SERPL-MCNC: 20 MG/DL (ref 8–22)
CALCIUM SERPL-MCNC: 9.3 MG/DL (ref 8.5–10.5)
CHLORIDE SERPL-SCNC: 105 MMOL/L (ref 96–112)
CO2 SERPL-SCNC: 23 MMOL/L (ref 20–33)
CREAT SERPL-MCNC: 1.09 MG/DL (ref 0.5–1.4)
EOSINOPHIL # BLD AUTO: 0.68 K/UL (ref 0–0.51)
EOSINOPHIL NFR BLD: 10.4 % (ref 0–6.9)
ERYTHROCYTE [DISTWIDTH] IN BLOOD BY AUTOMATED COUNT: 42.9 FL (ref 35.9–50)
GFR SERPLBLD CREATININE-BSD FMLA CKD-EPI: 73 ML/MIN/1.73 M 2
GLOBULIN SER CALC-MCNC: 3 G/DL (ref 1.9–3.5)
GLUCOSE SERPL-MCNC: 126 MG/DL (ref 65–99)
HCT VFR BLD AUTO: 42.9 % (ref 42–52)
HGB BLD-MCNC: 14.7 G/DL (ref 14–18)
IMM GRANULOCYTES # BLD AUTO: 0.03 K/UL (ref 0–0.11)
IMM GRANULOCYTES NFR BLD AUTO: 0.5 % (ref 0–0.9)
LYMPHOCYTES # BLD AUTO: 2.41 K/UL (ref 1–4.8)
LYMPHOCYTES NFR BLD: 37 % (ref 22–41)
MCH RBC QN AUTO: 31.4 PG (ref 27–33)
MCHC RBC AUTO-ENTMCNC: 34.3 G/DL (ref 33.7–35.3)
MCV RBC AUTO: 91.7 FL (ref 81.4–97.8)
MONOCYTES # BLD AUTO: 0.64 K/UL (ref 0–0.85)
MONOCYTES NFR BLD AUTO: 9.8 % (ref 0–13.4)
NEUTROPHILS # BLD AUTO: 2.67 K/UL (ref 1.82–7.42)
NEUTROPHILS NFR BLD: 40.9 % (ref 44–72)
NRBC # BLD AUTO: 0 K/UL
NRBC BLD-RTO: 0 /100 WBC
PLATELET # BLD AUTO: 234 K/UL (ref 164–446)
PMV BLD AUTO: 9.7 FL (ref 9–12.9)
POTASSIUM SERPL-SCNC: 4.2 MMOL/L (ref 3.6–5.5)
PROT SERPL-MCNC: 6.8 G/DL (ref 6–8.2)
RBC # BLD AUTO: 4.68 M/UL (ref 4.7–6.1)
SODIUM SERPL-SCNC: 138 MMOL/L (ref 135–145)
WBC # BLD AUTO: 6.5 K/UL (ref 4.8–10.8)

## 2022-07-18 PROCEDURE — 99284 EMERGENCY DEPT VISIT MOD MDM: CPT

## 2022-07-18 PROCEDURE — 36415 COLL VENOUS BLD VENIPUNCTURE: CPT

## 2022-07-18 PROCEDURE — 73630 X-RAY EXAM OF FOOT: CPT | Mod: RT

## 2022-07-18 PROCEDURE — 93971 EXTREMITY STUDY: CPT | Mod: RT

## 2022-07-18 PROCEDURE — 80053 COMPREHEN METABOLIC PANEL: CPT

## 2022-07-18 PROCEDURE — 93971 EXTREMITY STUDY: CPT | Mod: 26,RT | Performed by: INTERNAL MEDICINE

## 2022-07-18 PROCEDURE — 85025 COMPLETE CBC W/AUTO DIFF WBC: CPT

## 2022-07-18 PROCEDURE — 700102 HCHG RX REV CODE 250 W/ 637 OVERRIDE(OP): Performed by: EMERGENCY MEDICINE

## 2022-07-18 PROCEDURE — A9270 NON-COVERED ITEM OR SERVICE: HCPCS | Performed by: EMERGENCY MEDICINE

## 2022-07-18 RX ORDER — ACETAMINOPHEN 325 MG/1
650 TABLET ORAL ONCE
Status: COMPLETED | OUTPATIENT
Start: 2022-07-18 | End: 2022-07-18

## 2022-07-18 RX ORDER — AMOXICILLIN AND CLAVULANATE POTASSIUM 875; 125 MG/1; MG/1
1 TABLET, FILM COATED ORAL 2 TIMES DAILY
Qty: 14 TABLET | Refills: 0 | Status: SHIPPED | OUTPATIENT
Start: 2022-07-18

## 2022-07-18 RX ADMIN — ACETAMINOPHEN 650 MG: 325 TABLET, FILM COATED ORAL at 11:11

## 2022-07-18 ASSESSMENT — LIFESTYLE VARIABLES
ON A TYPICAL DAY WHEN YOU DRINK ALCOHOL HOW MANY DRINKS DO YOU HAVE: 0
CONSUMPTION TOTAL: NEGATIVE
HAVE YOU EVER FELT YOU SHOULD CUT DOWN ON YOUR DRINKING: NO
TOTAL SCORE: 0
EVER HAD A DRINK FIRST THING IN THE MORNING TO STEADY YOUR NERVES TO GET RID OF A HANGOVER: NO
DO YOU DRINK ALCOHOL: NO
TOTAL SCORE: 0
HAVE PEOPLE ANNOYED YOU BY CRITICIZING YOUR DRINKING: NO
HOW MANY TIMES IN THE PAST YEAR HAVE YOU HAD 5 OR MORE DRINKS IN A DAY: 0
EVER FELT BAD OR GUILTY ABOUT YOUR DRINKING: NO
TOTAL SCORE: 0
AVERAGE NUMBER OF DAYS PER WEEK YOU HAVE A DRINK CONTAINING ALCOHOL: 0

## 2022-07-18 ASSESSMENT — FIBROSIS 4 INDEX: FIB4 SCORE: 1.69

## 2022-07-18 NOTE — ED NOTES
Pt to green pod by WC, agree with triage RN note, pt has baseline discoloration of RLE, skin appears dry on bottom of foot, pt states radiating pain from bottom of foot to R groin x weeks, no obvious signs of infection on assessment, up for ERP to see

## 2022-07-18 NOTE — ED PROVIDER NOTES
"ED Provider Note      CHIEF COMPLAINT   Chief Complaint   Patient presents with   • Foot Pain     Bottom of R foot skin lesion/dry/discolored, pain has been radiating up leg, pt states has had cellulitis diagnosis of R foot before, +CMS       HPI   Servando Durham is a 71 y.o. male who presents with right foot pain.  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  He has had cellulitis in his right lower extremity in the past.  History of diabetes although this is diet controlled.  He is not sure when this started.  He has some cracks in the bottom of his right foot.  The pain radiates up all the way into the leg over the medial and back of the leg.  He has not noticed swelling of the extremity.  Has not had fever or skin rash other than the cracks in the skin.  The bottom of the foot is quite tender.  Sharp pain.  Nonradiating.  Denies trauma.    REVIEW OF SYSTEMS   As per HPI, all other systems reviewed and negative    PAST MEDICAL HISTORY   Past Medical History:   Diagnosis Date   • Arthritis     right leg   • Arthritis    • Aspiration pneumonia (HCC) 2011   • Backpain    • Congestive heart failure (HCC)    • Daytime sleepiness    • Diabetes    • Diabetes (HCC)    • ETOH abuse    • ETOHism (HCC)    • Fall    • Glaucoma     left eye   • Glaucoma    • Hypertension    • Indigestion    • Seizure (HCC)    • Sleep apnea    • Wears glasses        SOCIAL HISTORY  Social History     Tobacco Use   • Smoking status: Former Smoker     Packs/day: 0.25     Years: 35.00     Pack years: 8.75     Quit date: 2017     Years since quittin.2   • Smokeless tobacco: Never Used   Vaping Use   • Vaping Use: Never used   Substance Use Topics   • Alcohol use: No     Comment: not using since a year per pt    • Drug use: No     Comment: [facility]       ALLERGIES   See chart    PHYSICAL EXAM  VITAL SIGNS: BP (!) 149/81   Pulse 61   Temp 37 °C (98.6 °F) (Temporal)   Resp 16   Ht 1.676 m (5' 6\")   Wt 113 kg (250 lb)   SpO2 " 95%   BMI 40.35 kg/m²   Head: Atraumatic  Eyes: Eyes normal inspection  Neck: has full range of motion, normal inspection.  Constitutional: No acute distress   Cardiovascular: Normal heart rate. Pulses palpable posterior tibial  Thorax & Lungs: No respiratory distress  Skin: Dry cracked skin over the plantar aspect of the right foot.  The right plantar foot is tender.  Questionable erythema.  No drainage.  No fluctuance.  There is some tenderness in the right popliteal fossa.  No Homans or cords.  Musculoskeletal: As described above.  No focal bony tenderness.  Compartments soft.  Neurologic:  Normal sensory and motor    RADIOLOGY/PROCEDURES  US-EXTREMITY VENOUS LOWER UNILAT RIGHT   Final Result      DX-FOOT-COMPLETE 3+ RIGHT   Final Result      1. Hallux valgus.   2. Chronic appearing deformity of the head of the right second metatarsal bone, likely the sequelae of avascular necrosis.         Imaging is interpreted by radiologist    Results for orders placed or performed during the hospital encounter of 07/18/22   CBC WITH DIFFERENTIAL   Result Value Ref Range    WBC 6.5 4.8 - 10.8 K/uL    RBC 4.68 (L) 4.70 - 6.10 M/uL    Hemoglobin 14.7 14.0 - 18.0 g/dL    Hematocrit 42.9 42.0 - 52.0 %    MCV 91.7 81.4 - 97.8 fL    MCH 31.4 27.0 - 33.0 pg    MCHC 34.3 33.7 - 35.3 g/dL    RDW 42.9 35.9 - 50.0 fL    Platelet Count 234 164 - 446 K/uL    MPV 9.7 9.0 - 12.9 fL    Neutrophils-Polys 40.90 (L) 44.00 - 72.00 %    Lymphocytes 37.00 22.00 - 41.00 %    Monocytes 9.80 0.00 - 13.40 %    Eosinophils 10.40 (H) 0.00 - 6.90 %    Basophils 1.40 0.00 - 1.80 %    Immature Granulocytes 0.50 0.00 - 0.90 %    Nucleated RBC 0.00 /100 WBC    Neutrophils (Absolute) 2.67 1.82 - 7.42 K/uL    Lymphs (Absolute) 2.41 1.00 - 4.80 K/uL    Monos (Absolute) 0.64 0.00 - 0.85 K/uL    Eos (Absolute) 0.68 (H) 0.00 - 0.51 K/uL    Baso (Absolute) 0.09 0.00 - 0.12 K/uL    Immature Granulocytes (abs) 0.03 0.00 - 0.11 K/uL    NRBC (Absolute) 0.00 K/uL    COMP METABOLIC PANEL   Result Value Ref Range    Sodium 138 135 - 145 mmol/L    Potassium 4.2 3.6 - 5.5 mmol/L    Chloride 105 96 - 112 mmol/L    Co2 23 20 - 33 mmol/L    Anion Gap 10.0 7.0 - 16.0    Glucose 126 (H) 65 - 99 mg/dL    Bun 20 8 - 22 mg/dL    Creatinine 1.09 0.50 - 1.40 mg/dL    Calcium 9.3 8.5 - 10.5 mg/dL    AST(SGOT) 20 12 - 45 U/L    ALT(SGPT) 26 2 - 50 U/L    Alkaline Phosphatase 93 30 - 99 U/L    Total Bilirubin 0.4 0.1 - 1.5 mg/dL    Albumin 3.8 3.2 - 4.9 g/dL    Total Protein 6.8 6.0 - 8.2 g/dL    Globulin 3.0 1.9 - 3.5 g/dL    A-G Ratio 1.3 g/dL   ESTIMATED GFR   Result Value Ref Range    GFR (CKD-EPI) 73 >60 mL/min/1.73 m 2        COURSE & MEDICAL DECISION MAKING  Patient presents with right foot pain.  He has dry cracked skin with some slight redness.  He may have a mild infection on the bottom of the right foot.  No fluctuance or suggestion of abscess.  He described the pain radiating up his right lower extremity.  I obtained screening laboratory data and venous duplex.  Data was all remarkable.  X-ray of the right foot was negative.  Patient is nontoxic.  He is appropriate for outpatient management.  He needs local wound care.  Antibiotic ointment was applied and his foot was wrapped.  I told him he needs to see a podiatrist to get some inserts to keep from having trauma to the right foot.  He later reported some pain in his hip and knee.  This is likely because of gait disturbance as he has an AKA and walks with a prosthetic.  I do not see any joint effusion redness or warmth signs of infection and he has not had trauma.  He will need to follow-up with his primary provider for further assessment.  May need wound care referral.  He was precautioned to return to ER for fever, expanding redness, purulent drainage from the foot or concern.    FINAL IMPRESSION  1.  Right foot pain, suspected mild cellulitis/wound infection      This dictation was created using voice recognition software. The  accuracy of the dictation is limited to the abilities of the software. I expect there may be some errors of grammar and possibly content. The nursing notes were reviewed and certain aspects of this information were incorporated into this note.      Electronically signed by: Mahad Alegria M.D., 7/18/2022 10:43 AM
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
## 2022-11-02 ENCOUNTER — PATIENT MESSAGE (OUTPATIENT)
Dept: HEALTH INFORMATION MANAGEMENT | Facility: OTHER | Age: 72
End: 2022-11-02

## 2023-05-22 ENCOUNTER — TELEPHONE (OUTPATIENT)
Dept: HEALTH INFORMATION MANAGEMENT | Facility: OTHER | Age: 73
End: 2023-05-22
Payer: MEDICARE

## 2023-05-22 ENCOUNTER — TELEPHONE (OUTPATIENT)
Dept: SLEEP MEDICINE | Facility: MEDICAL CENTER | Age: 73
End: 2023-05-22
Payer: MEDICARE

## 2023-05-23 NOTE — TELEPHONE ENCOUNTER
05-22-23  1st NO SHOW  Date of No Show: 05-19-23  Provider: mariusz  Reason For Visit: NP/ Sleep   Outcome of call:    no answer unable to LVM.  Number no longer in service  Reason Missed: N/A  ACM

## 2024-11-05 ENCOUNTER — HOSPITAL ENCOUNTER (EMERGENCY)
Facility: MEDICAL CENTER | Age: 74
End: 2024-11-05
Attending: EMERGENCY MEDICINE
Payer: MEDICARE

## 2024-11-05 ENCOUNTER — PHARMACY VISIT (OUTPATIENT)
Dept: PHARMACY | Facility: MEDICAL CENTER | Age: 74
End: 2024-11-05
Payer: COMMERCIAL

## 2024-11-05 VITALS
DIASTOLIC BLOOD PRESSURE: 69 MMHG | BODY MASS INDEX: 40.18 KG/M2 | HEIGHT: 66 IN | OXYGEN SATURATION: 93 % | WEIGHT: 250 LBS | RESPIRATION RATE: 18 BRPM | HEART RATE: 66 BPM | SYSTOLIC BLOOD PRESSURE: 129 MMHG | TEMPERATURE: 96.7 F

## 2024-11-05 DIAGNOSIS — E11.65 UNCONTROLLED TYPE 2 DIABETES MELLITUS WITH HYPERGLYCEMIA, WITHOUT LONG-TERM CURRENT USE OF INSULIN (HCC): ICD-10-CM

## 2024-11-05 DIAGNOSIS — K59.00 CONSTIPATION, UNSPECIFIED CONSTIPATION TYPE: ICD-10-CM

## 2024-11-05 DIAGNOSIS — E86.0 DEHYDRATION: ICD-10-CM

## 2024-11-05 LAB
ALBUMIN SERPL BCP-MCNC: 4.2 G/DL (ref 3.2–4.9)
ALBUMIN/GLOB SERPL: 1.1 G/DL
ALP SERPL-CCNC: 173 U/L (ref 30–99)
ALT SERPL-CCNC: 16 U/L (ref 2–50)
ANION GAP SERPL CALC-SCNC: 18 MMOL/L (ref 7–16)
AST SERPL-CCNC: 18 U/L (ref 12–45)
B-OH-BUTYR SERPL-MCNC: 0.15 MMOL/L (ref 0.02–0.27)
BASE EXCESS BLDV CALC-SCNC: -1 MMOL/L
BASOPHILS # BLD AUTO: 1.5 % (ref 0–1.8)
BASOPHILS # BLD: 0.14 K/UL (ref 0–0.12)
BILIRUB SERPL-MCNC: 0.3 MG/DL (ref 0.1–1.5)
BODY TEMPERATURE: 35.9 CENTIGRADE
BUN SERPL-MCNC: 16 MG/DL (ref 8–22)
CALCIUM ALBUM COR SERPL-MCNC: 10.4 MG/DL (ref 8.5–10.5)
CALCIUM SERPL-MCNC: 10.6 MG/DL (ref 8.5–10.5)
CHLORIDE SERPL-SCNC: 90 MMOL/L (ref 96–112)
CO2 SERPL-SCNC: 22 MMOL/L (ref 20–33)
CREAT SERPL-MCNC: 0.95 MG/DL (ref 0.5–1.4)
EOSINOPHIL # BLD AUTO: 0.44 K/UL (ref 0–0.51)
EOSINOPHIL NFR BLD: 4.8 % (ref 0–6.9)
ERYTHROCYTE [DISTWIDTH] IN BLOOD BY AUTOMATED COUNT: 37.4 FL (ref 35.9–50)
GFR SERPLBLD CREATININE-BSD FMLA CKD-EPI: 85 ML/MIN/1.73 M 2
GLOBULIN SER CALC-MCNC: 3.9 G/DL (ref 1.9–3.5)
GLUCOSE BLD STRIP.AUTO-MCNC: 283 MG/DL (ref 65–99)
GLUCOSE BLD STRIP.AUTO-MCNC: 415 MG/DL (ref 65–99)
GLUCOSE SERPL-MCNC: 433 MG/DL (ref 65–99)
HCO3 BLDV-SCNC: 23 MMOL/L (ref 24–28)
HCT VFR BLD AUTO: 48.4 % (ref 42–52)
HGB BLD-MCNC: 16.9 G/DL (ref 14–18)
IMM GRANULOCYTES # BLD AUTO: 0.02 K/UL (ref 0–0.11)
IMM GRANULOCYTES NFR BLD AUTO: 0.2 % (ref 0–0.9)
INHALED O2 FLOW RATE: ABNORMAL L/MIN
LACTATE SERPL-SCNC: 1.5 MMOL/L (ref 0.5–2)
LIPASE SERPL-CCNC: 42 U/L (ref 11–82)
LYMPHOCYTES # BLD AUTO: 2.62 K/UL (ref 1–4.8)
LYMPHOCYTES NFR BLD: 28.4 % (ref 22–41)
MCH RBC QN AUTO: 29.7 PG (ref 27–33)
MCHC RBC AUTO-ENTMCNC: 34.9 G/DL (ref 32.3–36.5)
MCV RBC AUTO: 85.1 FL (ref 81.4–97.8)
MONOCYTES # BLD AUTO: 0.66 K/UL (ref 0–0.85)
MONOCYTES NFR BLD AUTO: 7.2 % (ref 0–13.4)
NEUTROPHILS # BLD AUTO: 5.34 K/UL (ref 1.82–7.42)
NEUTROPHILS NFR BLD: 57.9 % (ref 44–72)
NRBC # BLD AUTO: 0 K/UL
NRBC BLD-RTO: 0 /100 WBC (ref 0–0.2)
PCO2 BLDV: 36 MMHG (ref 41–51)
PCO2 TEMP ADJ BLDV: 34.3 MMHG (ref 41–51)
PH BLDV: 7.42 [PH] (ref 7.31–7.45)
PH TEMP ADJ BLDV: 7.43 [PH] (ref 7.31–7.45)
PLATELET # BLD AUTO: 258 K/UL (ref 164–446)
PMV BLD AUTO: 10.4 FL (ref 9–12.9)
PO2 BLDV: 52.2 MMHG (ref 25–40)
PO2 TEMP ADJ BLDV: 48.3 MMHG (ref 25–40)
POTASSIUM SERPL-SCNC: 3.9 MMOL/L (ref 3.6–5.5)
PROT SERPL-MCNC: 8.1 G/DL (ref 6–8.2)
RBC # BLD AUTO: 5.69 M/UL (ref 4.7–6.1)
SAO2 % BLDV: 86.2 %
SODIUM SERPL-SCNC: 130 MMOL/L (ref 135–145)
WBC # BLD AUTO: 9.2 K/UL (ref 4.8–10.8)

## 2024-11-05 PROCEDURE — 85025 COMPLETE CBC W/AUTO DIFF WBC: CPT

## 2024-11-05 PROCEDURE — 80053 COMPREHEN METABOLIC PANEL: CPT

## 2024-11-05 PROCEDURE — 83605 ASSAY OF LACTIC ACID: CPT

## 2024-11-05 PROCEDURE — 82803 BLOOD GASES ANY COMBINATION: CPT

## 2024-11-05 PROCEDURE — 99284 EMERGENCY DEPT VISIT MOD MDM: CPT

## 2024-11-05 PROCEDURE — 83690 ASSAY OF LIPASE: CPT

## 2024-11-05 PROCEDURE — 82010 KETONE BODYS QUAN: CPT

## 2024-11-05 PROCEDURE — 700111 HCHG RX REV CODE 636 W/ 250 OVERRIDE (IP): Mod: UD | Performed by: EMERGENCY MEDICINE

## 2024-11-05 PROCEDURE — RXMED WILLOW AMBULATORY MEDICATION CHARGE: Performed by: EMERGENCY MEDICINE

## 2024-11-05 PROCEDURE — 36415 COLL VENOUS BLD VENIPUNCTURE: CPT

## 2024-11-05 PROCEDURE — 82962 GLUCOSE BLOOD TEST: CPT

## 2024-11-05 PROCEDURE — 96374 THER/PROPH/DIAG INJ IV PUSH: CPT

## 2024-11-05 RX ADMIN — INSULIN HUMAN 5 UNITS: 1 INJECTION, SOLUTION INTRAVENOUS at 20:07

## 2024-11-06 NOTE — ED NOTES
Bedside report given to SANTIAGO Coombs.  Pt on RA, on monitor.  Attempting IV access.   Call light in reach.

## 2024-11-06 NOTE — ED NOTES
Pt BIB EMS from [place].  Pt newly dx with Diabetes, started metformin today and took BS and was 364 and pt was concerned.  Pt asymptomatic.  Last  for EMS.  ERP to see.

## 2024-11-06 NOTE — ED PROVIDER NOTES
ED Provider Note    CHIEF COMPLAINT  Chief Complaint   Patient presents with    High Blood Sugar       EXTERNAL RECORDS REVIEWED  Outpatient Notes reviewed note by Dr. Waldrop dated May 6, 2021.  and Outpatient labs & studies reviewed CBC and CMP dated July 18, 2022, nonactionable, mild hyperglycemia noted 126.    HPI/ROS  LIMITATION TO HISTORY   Select: : None  OUTSIDE HISTORIAN(S):  Family at bedside asked if the patient needs to be on insulin    Servando Durham is a 74 y.o. male who presents for evaluation of elevated blood sugar reading.  Patient diagnosed with type 2 diabetes today in the outpatient setting, blood sugar 330 and had his first dose of metformin, had 1 g at about 4 PM.  For eating in the evening he checked his blood sugar and noted it was over 400.  He became concerned and came to be assessed.  No nausea, no vomiting, does note sensation of abdominal bloating and has not had a bowel movement today.  No fever, relates polyuria and polydipsia.  Never had metformin before, initial diagnosed with diabetes was today in clinic.    PAST MEDICAL HISTORY   has a past medical history of Arthritis, Arthritis, Aspiration pneumonia (HCC) (12/27/2011), Backpain, Congestive heart failure (HCC), Daytime sleepiness, Diabetes, Diabetes (HCC), ETOH abuse, ETOHism (HCC), Fall, Glaucoma, Glaucoma, Hypertension, Indigestion, Seizure (HCC), Sleep apnea, and Wears glasses.    SURGICAL HISTORY   has a past surgical history that includes amputate thigh,thru femur; other; other orthopedic surgery; other (1970); and amputation low leg thru tib/fib.    FAMILY HISTORY  Family History   Problem Relation Age of Onset    No Known Problems Mother     No Known Problems Father     Heart Disease Neg Hx    Diabetes mellitus and half sister    SOCIAL HISTORY  Social History     Tobacco Use    Smoking status: Former     Current packs/day: 0.00     Average packs/day: 0.3 packs/day for 35.0 years (8.8 ttl pk-yrs)     Types: Cigarettes      "Start date: 1982     Quit date: 2017     Years since quittin.5    Smokeless tobacco: Never   Vaping Use    Vaping status: Never Used   Substance and Sexual Activity    Alcohol use: No     Comment: not using since a year per pt     Drug use: No     Comment:     Sexual activity: Not on file       CURRENT MEDICATIONS  Home Medications       Reviewed by Elva Greenwood R.N. (Registered Nurse) on 24 at 1910  Med List Status: Not Addressed     Medication Last Dose Status   amoxicillin-clavulanate (AUGMENTIN) 875-125 MG Tab  Active   aspirin EC (ECOTRIN) 81 MG TBEC  Active   calcium carbonate (TUMS) 500 MG Chew Tab  Active   gabapentin (NEURONTIN) 100 MG Cap  Active   ibuprofen (MOTRIN) 200 MG Tab  Active   METFORMIN HCL PO  Active   simvastatin (ZOCOR) 10 MG Tab  Active   zolpidem (AMBIEN) 5 MG Tab  Active                    ALLERGIES  Allergies   Allergen Reactions    Banana Swelling     Pt reports when bananas are consumed pt develops an itchy and swollen mouth and eyes water.         PHYSICAL EXAM  VITAL SIGNS: BP (!) 142/73   Pulse 69   Temp 36.1 °C (96.9 °F) (Temporal)   Resp 18   Ht 1.676 m (5' 6\")   Wt 113 kg (250 lb)   SpO2 91%   BMI 40.35 kg/m²    General: Alert, no acute distress  Skin: Warm, dry, normal for ethnicity  Head: Normocephalic, atraumatic  Neck: Trachea midline  Eye: Sclera anicteric, normal conjunctiva  ENMT: Oral mucosa pink and dry  Cardiovascular: Regular rate and rhythm, Normal peripheral perfusion  Respiratory: respirations are non-labored  Gastrointestinal: Soft, mild tenderness in the epigastrium in all 4 quadrants, no guarding, no rebound, no rigidity.  Abdomen nondistended.  Musculoskeletal: No swelling, no deformity  Neurological: Alert and oriented to person, place, time, and situation  Psychiatric: Cooperative, appropriate mood & affect     EKG/LABS  Results for orders placed or performed during the hospital encounter of 24   CBC w/ Differential    " Collection Time: 11/05/24  7:32 PM   Result Value Ref Range    WBC 9.2 4.8 - 10.8 K/uL    RBC 5.69 4.70 - 6.10 M/uL    Hemoglobin 16.9 14.0 - 18.0 g/dL    Hematocrit 48.4 42.0 - 52.0 %    MCV 85.1 81.4 - 97.8 fL    MCH 29.7 27.0 - 33.0 pg    MCHC 34.9 32.3 - 36.5 g/dL    RDW 37.4 35.9 - 50.0 fL    Platelet Count 258 164 - 446 K/uL    MPV 10.4 9.0 - 12.9 fL    Neutrophils-Polys 57.90 44.00 - 72.00 %    Lymphocytes 28.40 22.00 - 41.00 %    Monocytes 7.20 0.00 - 13.40 %    Eosinophils 4.80 0.00 - 6.90 %    Basophils 1.50 0.00 - 1.80 %    Immature Granulocytes 0.20 0.00 - 0.90 %    Nucleated RBC 0.00 0.00 - 0.20 /100 WBC    Neutrophils (Absolute) 5.34 1.82 - 7.42 K/uL    Lymphs (Absolute) 2.62 1.00 - 4.80 K/uL    Monos (Absolute) 0.66 0.00 - 0.85 K/uL    Eos (Absolute) 0.44 0.00 - 0.51 K/uL    Baso (Absolute) 0.14 (H) 0.00 - 0.12 K/uL    Immature Granulocytes (abs) 0.02 0.00 - 0.11 K/uL    NRBC (Absolute) 0.00 K/uL   Complete Metabolic Panel (CMP)    Collection Time: 11/05/24  7:32 PM   Result Value Ref Range    Sodium 130 (L) 135 - 145 mmol/L    Potassium 3.9 3.6 - 5.5 mmol/L    Chloride 90 (L) 96 - 112 mmol/L    Co2 22 20 - 33 mmol/L    Anion Gap 18.0 (H) 7.0 - 16.0    Glucose 433 (H) 65 - 99 mg/dL    Bun 16 8 - 22 mg/dL    Creatinine 0.95 0.50 - 1.40 mg/dL    Calcium 10.6 (H) 8.5 - 10.5 mg/dL    Correct Calcium 10.4 8.5 - 10.5 mg/dL    AST(SGOT) 18 12 - 45 U/L    ALT(SGPT) 16 2 - 50 U/L    Alkaline Phosphatase 173 (H) 30 - 99 U/L    Total Bilirubin 0.3 0.1 - 1.5 mg/dL    Albumin 4.2 3.2 - 4.9 g/dL    Total Protein 8.1 6.0 - 8.2 g/dL    Globulin 3.9 (H) 1.9 - 3.5 g/dL    A-G Ratio 1.1 g/dL   LIPASE    Collection Time: 11/05/24  7:32 PM   Result Value Ref Range    Lipase 42 11 - 82 U/L   BETA-HYDROXYBUTYRIC ACID    Collection Time: 11/05/24  7:32 PM   Result Value Ref Range    beta-Hydroxybutyric Acid 0.15 0.02 - 0.27 mmol/L   ESTIMATED GFR    Collection Time: 11/05/24  7:32 PM   Result Value Ref Range    GFR (CKD-EPI)  85 >60 mL/min/1.73 m 2   LACTIC ACID    Collection Time: 11/05/24  8:03 PM   Result Value Ref Range    Lactic Acid 1.5 0.5 - 2.0 mmol/L   VENOUS BLOOD GAS    Collection Time: 11/05/24  8:03 PM   Result Value Ref Range    Venous Bg Ph 7.42 7.31 - 7.45    Venous Bg Ph Temp Corrected 7.43 7.31 - 7.45    Venous Bg Pco2 36.0 (L) 41.0 - 51.0 mmHg    Venous Bg Pco2 Temp Corrected 34.3 (L) 41.0 - 51.0 mmHg    Venous Bg Po2 52.2 (H) 25.0 - 40.0 mmHg    Venous Bg Po2 Temp Corrected 48.3 (H) 25.0 - 40.0 mmHg    Venous Bg O2 Saturation 86.2 %    Venous Bg Hco3 23 (L) 24 - 28 mmol/L    Venous Bg Base Excess -1 mmol/L    Body Temp 35.9 Centigrade    O2 Therapy RA    POCT glucose device results    Collection Time: 11/05/24  8:10 PM   Result Value Ref Range    POC Glucose, Blood 415 (HH) 65 - 99 mg/dL      I have independently interpreted these laboratory analyses          COURSE & MEDICAL DECISION MAKING    ASSESSMENT, COURSE AND PLAN  Care Narrative: This patient is a very pleasant 74-year-old gentleman with diagnosis today of type 2 diabetes mellitus.  He has just been started on metformin.  He started checking his sugars and found them fairly elevated, over 400 today.  Reassuring workup does not demonstrate diabetic ketoacidosis, he has negative beta hydroxybutyrate, he has no lactic acidosis.  No significant electrolyte abnormalities beyond pseudohyponatremia consistent with hyperglycemia.  This is improving in the ER with a small dose of insulin.  Given tolerating oral intake and otherwise well in appearance with evidence only of hyperglycemia there is no indication for inpatient management.  I do not feel the patient needs to be started at this point on insulin, he has just had his first dose of metformin today and I noted to patient and family but does take some time for this medication to improve his hyperglycemia.  He we will try to be compliant on his diabetic diet and is amenable to follow-up with established primary  "care provider.    ED OBS: Yes; I am placing the patient in to an observation status due to a diagnostic uncertainty as well as therapeutic intensity. Patient placed in observation status at 7:40 PM, 11/5/2024.     Observation plan is as follows: Patient medicated with insulin 5 units IV.  Metabolic workup as well as beta hydroxybutyrate and venous blood gas will be obtained.  Differential diagnosis includes but not restricted to hyperglycemia, diabetic ketoacidosis, electrolyte derangement, hyperosmolar state, ketosis, pancreatitis, lactic acidosis    2023: Labs thankfully are not consistent with metabolic acidosis.  Given volume pleated and hyperglycemic have ordered p.o. hydration.    2037: Chemistry thankfully reassuring, pseudohyponatremia noted from his hyperglycemia, thankfully no transaminitis, alkaline phosphatase is moderately elevated but similar to previous baseline in May 2021 on chart review.    2118: Serum blood sugar much improved, currently 283.     Upon Reevaluation, the patient's condition has: Improved; and will be discharged.    Patient discharged from ED Observation status at 2123 (Time) 11/5/24 (Date).       Patient Vitals for the past 24 hrs:   BP Temp Temp src Pulse Resp SpO2 Height Weight   11/05/24 2100 (!) 142/73 -- -- 69 18 91 % -- --   11/05/24 2000 129/54 -- -- 68 18 94 % -- --   11/05/24 1913 139/72 36.1 °C (96.9 °F) Temporal 71 18 93 % -- --   11/05/24 1909 -- -- -- -- -- -- 1.676 m (5' 6\") 113 kg (250 lb)        ADDITIONAL PROBLEMS MANAGED  Elevated blood pressure reading with no evidence of hypertensive urgency or emergency, hyperglycemia, new diagnosis of diabetes mellitus type 2    DISPOSITION AND DISCUSSIONS  I have discussed management of the patient with the following physicians and JEAN PIERRE's:  NA    Discussion of management with other QHP or appropriate source(s): None     Escalation of care considered, and ultimately not performed:IV fluids and acute inpatient care management, " however at this time, the patient is most appropriate for outpatient management    Barriers to care at this time, including but not limited to:  NA .     Decision tools and prescription drugs considered including, but not limited to:  SIRS criteria for sepsis not .    The patient will return for new or worsening symptoms and is stable at the time of discharge.    Patient has had high blood pressure while in the emergency department, felt likely secondary to medical condition. Counseled patient to monitor blood pressure at home and follow up with primary care physician.      DISPOSITION:  Patient will be discharged home in stable condition.    FOLLOW UP:  KAY Lopez  [address] [place] [id number]  [phone]    Schedule an appointment as soon as possible for a visit         OUTPATIENT MEDICATIONS:  New Prescriptions    PSYLLIUM 25 % PACKET    Take 1 Packet by mouth 2 times a day.          FINAL DIAGNOSIS  1. Uncontrolled type 2 diabetes mellitus with hyperglycemia, without long-term current use of insulin (HCC)    2. Dehydration    3. Constipation, unspecified constipation type         Electronically signed by: Alex Hart M.D., 11/5/2024 7:37 PM

## 2024-11-06 NOTE — ED NOTES
Bedside report received from off going RN/tech: Elva, assumed care of patient.  POC discussed with patient. Call light within reach, all needs addressed at this time.       Fall risk interventions in place: Patient's personal possessions are with in their safe reach, Give patient urinal if applicable, Keep floor surfaces clean and dry, and Accompanied to restroom (all applicable per [place] Fall risk assessment)   Continuous monitoring: Cardiac Leads, Pulse Ox, or Blood Pressure  IVF/IV medications: Not Applicable   Oxygen: Room Air  Bedside sitter: Not Applicable   Isolation: Not Applicable

## 2024-11-06 NOTE — ED NOTES
Pt discharged to home. Pt was given follow up instructions and prescriptions for Metamucil. Pt verbalized understanding of all instructions for discharge and is assisted out of the ED via wheelchair. AOx4